# Patient Record
Sex: MALE | Race: WHITE | NOT HISPANIC OR LATINO | Employment: OTHER | URBAN - METROPOLITAN AREA
[De-identification: names, ages, dates, MRNs, and addresses within clinical notes are randomized per-mention and may not be internally consistent; named-entity substitution may affect disease eponyms.]

---

## 2017-06-17 DIAGNOSIS — I10 ESSENTIAL HYPERTENSION WITH GOAL BLOOD PRESSURE LESS THAN 140/90: ICD-10-CM

## 2017-06-19 RX ORDER — VALSARTAN AND HYDROCHLOROTHIAZIDE 320; 25 MG/1; MG/1
TABLET, FILM COATED ORAL
Start: 2017-06-19

## 2017-06-19 NOTE — TELEPHONE ENCOUNTER
Pending Prescriptions:                       Disp   Refills    valsartan-hydrochlorothiazide (DIOVAN-HCT*90 tab*2            Sig: TAKE 1 TABLET DAILY        Last Written Prescription Date: 7/8/2016  Last Fill Quantity: 90, # refills: 3  Last Office Visit with [facility], P or [facility] prescribing provider: 11/15/2016  Next 5 appointments (look out 90 days)     Aug 21, 2017 10:00 AM CDT   PHYSICAL with Amaury Lyon MD   [facility] ([facility])    [address] [place]  [facility] [id number] [phone]                   Potassium   Date Value Ref Range Status   11/15/2016 4.0 3.4 - 5.3 mmol/L Final     Creatinine   Date Value Ref Range Status   11/15/2016 1.15 0.66 - 1.25 mg/dL Final     BP Readings from Last 3 Encounters:   11/15/16 135/81   11/08/16 130/88   07/08/16 130/70

## 2017-08-21 ENCOUNTER — OFFICE VISIT (OUTPATIENT)
Dept: FAMILY MEDICINE | Facility: CLINIC | Age: 72
End: 2017-08-21
Payer: MEDICARE

## 2017-08-21 VITALS
DIASTOLIC BLOOD PRESSURE: 80 MMHG | SYSTOLIC BLOOD PRESSURE: 134 MMHG | OXYGEN SATURATION: 94 % | BODY MASS INDEX: 32.06 KG/M2 | TEMPERATURE: 96.9 F | RESPIRATION RATE: 14 BRPM | WEIGHT: 229 LBS | HEIGHT: 71 IN | HEART RATE: 74 BPM

## 2017-08-21 DIAGNOSIS — N40.1 BENIGN NON-NODULAR PROSTATIC HYPERPLASIA WITH LOWER URINARY TRACT SYMPTOMS: ICD-10-CM

## 2017-08-21 DIAGNOSIS — E05.90 HYPERTHYROIDISM: ICD-10-CM

## 2017-08-21 DIAGNOSIS — E78.5 HYPERLIPIDEMIA LDL GOAL <130: ICD-10-CM

## 2017-08-21 DIAGNOSIS — Z00.00 ROUTINE HISTORY AND PHYSICAL EXAMINATION OF ADULT: Primary | ICD-10-CM

## 2017-08-21 DIAGNOSIS — I10 ESSENTIAL HYPERTENSION WITH GOAL BLOOD PRESSURE LESS THAN 140/90: ICD-10-CM

## 2017-08-21 DIAGNOSIS — K21.9 GASTROESOPHAGEAL REFLUX DISEASE, ESOPHAGITIS PRESENCE NOT SPECIFIED: ICD-10-CM

## 2017-08-21 DIAGNOSIS — J30.1 CHRONIC SEASONAL ALLERGIC RHINITIS DUE TO POLLEN: ICD-10-CM

## 2017-08-21 LAB
ANION GAP SERPL CALCULATED.3IONS-SCNC: 8 MMOL/L (ref 3–14)
BUN SERPL-MCNC: 19 MG/DL (ref 7–30)
CALCIUM SERPL-MCNC: 9.1 MG/DL (ref 8.5–10.1)
CHLORIDE SERPL-SCNC: 103 MMOL/L (ref 94–109)
CHOLEST SERPL-MCNC: 183 MG/DL
CO2 SERPL-SCNC: 31 MMOL/L (ref 20–32)
CREAT SERPL-MCNC: 1.23 MG/DL (ref 0.66–1.25)
GFR SERPL CREATININE-BSD FRML MDRD: 58 ML/MIN/1.7M2
GLUCOSE SERPL-MCNC: 95 MG/DL (ref 70–99)
HDLC SERPL-MCNC: 53 MG/DL
LDLC SERPL CALC-MCNC: 103 MG/DL
NONHDLC SERPL-MCNC: 130 MG/DL
POTASSIUM SERPL-SCNC: 3.3 MMOL/L (ref 3.4–5.3)
SODIUM SERPL-SCNC: 142 MMOL/L (ref 133–144)
TRIGL SERPL-MCNC: 136 MG/DL
TSH SERPL DL<=0.005 MIU/L-ACNC: 1.17 MU/L (ref 0.4–4)

## 2017-08-21 PROCEDURE — 84443 ASSAY THYROID STIM HORMONE: CPT | Performed by: FAMILY MEDICINE

## 2017-08-21 PROCEDURE — G0439 PPPS, SUBSEQ VISIT: HCPCS | Performed by: FAMILY MEDICINE

## 2017-08-21 PROCEDURE — 80048 BASIC METABOLIC PNL TOTAL CA: CPT | Performed by: FAMILY MEDICINE

## 2017-08-21 PROCEDURE — 36415 COLL VENOUS BLD VENIPUNCTURE: CPT | Performed by: FAMILY MEDICINE

## 2017-08-21 PROCEDURE — 80061 LIPID PANEL: CPT | Performed by: FAMILY MEDICINE

## 2017-08-21 RX ORDER — VALSARTAN AND HYDROCHLOROTHIAZIDE 320; 25 MG/1; MG/1
1 TABLET, FILM COATED ORAL DAILY
Qty: 90 TABLET | Refills: 3 | Status: SHIPPED | OUTPATIENT
Start: 2017-08-21 | End: 2018-08-02

## 2017-08-21 RX ORDER — ROSUVASTATIN CALCIUM 10 MG/1
10 TABLET, COATED ORAL DAILY
Qty: 90 TABLET | Refills: 3 | Status: SHIPPED | OUTPATIENT
Start: 2017-08-21 | End: 2018-08-23

## 2017-08-21 RX ORDER — ESOMEPRAZOLE MAGNESIUM 40 MG/1
CAPSULE, DELAYED RELEASE ORAL
Qty: 90 CAPSULE | Refills: 3 | Status: SHIPPED | OUTPATIENT
Start: 2017-08-21 | End: 2018-09-18

## 2017-08-21 RX ORDER — FLUTICASONE PROPIONATE 50 MCG
2 SPRAY, SUSPENSION (ML) NASAL DAILY
Qty: 16 G | Refills: 3 | Status: SHIPPED | OUTPATIENT
Start: 2017-08-21 | End: 2018-01-30

## 2017-08-21 RX ORDER — FINASTERIDE 5 MG/1
1 TABLET, FILM COATED ORAL DAILY
Qty: 90 TABLET | Refills: 3 | Status: SHIPPED | OUTPATIENT
Start: 2017-08-21 | End: 2018-09-18

## 2017-08-21 NOTE — MR AVS SNAPSHOT
After Visit Summary   8/21/2017    Jerry Miguel    MRN: [id number]           Patient Information     Date Of Birth          1945        Visit Information        Provider Department      8/21/2017 10:00 AM Amaury Lyon MD [facility]        Today's Diagnoses     Routine history and physical examination of adult    -  1    Essential hypertension with goal blood pressure less than 140/90        Hyperlipidemia LDL goal <130        Gastroesophageal reflux disease, esophagitis presence not specified        Benign non-nodular prostatic hyperplasia with lower urinary tract symptoms        Chronic seasonal allergic rhinitis due to pollen        Hyperthyroidism          Care Instructions      Preventive Health Recommendations:       Male Ages 65 and over    Yearly exam:             See your health care provider every year in order to  o   Review health changes.   o   Discuss preventive care.    o   Review your medicines if your doctor has prescribed any.    Talk with your health care provider about whether you should have a test to screen for prostate cancer (PSA).    Every 3 years, have a diabetes test (fasting glucose). If you are at risk for diabetes, you should have this test more often.    Every 5 years, have a cholesterol test. Have this test more often if you are at risk for high cholesterol or heart disease.     Every 10 years, have a colonoscopy. Or, have a yearly FIT test (stool test). These exams will check for colon cancer.    Talk to with your health care provider about screening for Abdominal Aortic Aneurysm if you have a family history of AAA or have a history of smoking.  Shots:     Get a flu shot each year.     Get a tetanus shot every 10 years.     Talk to your doctor about your pneumonia vaccines. There are now two you should receive - Pneumovax (PPSV 23) and Prevnar (PCV 13).    Talk to your doctor about a shingles vaccine.     Talk to your doctor about the  hepatitis B vaccine.  Nutrition:     Eat at least 5 servings of fruits and vegetables each day.     Eat whole-grain bread, whole-wheat pasta and brown rice instead of white grains and rice.     Talk to your doctor about Calcium and Vitamin D.   Lifestyle    Exercise for at least 150 minutes a week (30 minutes a day, 5 days a week). This will help you control your weight and prevent disease.     Limit alcohol to one drink per day.     No smoking.     Wear sunscreen to prevent skin cancer.     See your dentist every six months for an exam and cleaning.     See your eye doctor every 1 to 2 years to screen for conditions such as glaucoma, macular degeneration and cataracts.          Follow-ups after your visit        Follow-up notes from your care team     Return in about 1 year (around 8/21/2018).      Who to contact     If you have questions or need follow up information about today's clinic visit or your schedule please contact [facility] directly at [phone].  Normal or non-critical lab and imaging results will be communicated to you by Skytaphart, letter or phone within 4 business days after the clinic has received the results. If you do not hear from us within 7 days, please contact the clinic through MeUndies or phone. If you have a critical or abnormal lab result, we will notify you by phone as soon as possible.  Submit refill requests through MeUndies or call your pharmacy and they will forward the refill request to us. Please allow 3 business days for your refill to be completed.          Additional Information About Your Visit        MeUndies Information     MeUndies gives you secure access to your electronic health record. If you see a primary care provider, you can also send messages to your care team and make appointments. If you have questions, please call your primary care clinic.  If you do not have a primary care provider, please call [phone] and they will assist you.        Care  "EveryWhere ID     This is your Care EveryWhere ID. This could be used by other organizations to access your [place] medical records  [id number]        Your Vitals Were     Pulse Temperature Respirations Height Pulse Oximetry BMI (Body Mass Index)    74 96.9  F (36.1  C) (Oral) 14 5' 11\" (1.803 m) 94% 31.94 kg/m2       Blood Pressure from Last 3 Encounters:   08/21/17 134/80   11/15/16 135/81   11/08/16 130/88    Weight from Last 3 Encounters:   08/21/17 229 lb (103.9 kg)   11/15/16 220 lb (99.8 kg)   11/08/16 217 lb (98.4 kg)              We Performed the Following     Basic metabolic panel  (Ca, Cl, CO2, Creat, Gluc, K, Na, BUN)     Lipid panel reflex to direct LDL     TSH with free T4 reflex          Where to get your medicines      These medications were sent to [facility] [address] [address]     Phone:  [phone]     esomeprazole 40 MG CR capsule    finasteride 5 MG tablet    fluticasone 50 MCG/ACT spray    rosuvastatin 10 MG tablet    valsartan-hydrochlorothiazide 320-25 MG per tablet          Primary Care Provider Office Phone # Fax #    Amaury Alejandro Lyon [phone] [address] [facility] [address]        Equal Access to Services     [facility]: Hadii juan david ku hadasho Soreiali, waaxda luqadaha, qaybta kaalmada adeegyada, nanci garay. So [facility] [phone].    ATENCIÓN: Si habla español, tiene a abel disposición servicios gratuitos de asistencia lingüística. Yocasta al [phone].    We comply with applicable federal civil rights laws and [place] laws. We do not discriminate on the basis of race, color, national origin, age, disability sex, sexual orientation or gender identity.            Thank you!     Thank you for choosing [facility]  for your care. Our goal is always to provide you with excellent care. Hearing back from our patients is one " way we can continue to improve our services. Please take a few minutes to complete the written survey that you may receive in the mail after your visit with us. Thank you!             Your Updated Medication List - Protect others around you: Learn how to safely use, store and throw away your medicines at www.disposemymeds.org.          This list is accurate as of: 8/21/17 11:15 AM.  Always use your most recent med list.                   Brand Name Dispense Instructions for use Diagnosis    acetaminophen 325 MG tablet    TYLENOL    100 tablet    Take 3 tablets (975 mg) by mouth every 8 hours    Primary osteoarthritis of right knee       esomeprazole 40 MG CR capsule    nexIUM    90 capsule    TAKE 1 CAPSULE DAILY 30 TO 60 MINUTES BEFORE EATING    Gastroesophageal reflux disease, esophagitis presence not specified       finasteride 5 MG tablet    PROSCAR    90 tablet    Take 1 tablet (5 mg) by mouth daily    Benign non-nodular prostatic hyperplasia with lower urinary tract symptoms       fluticasone 50 MCG/ACT spray    FLONASE    16 g    Spray 2 sprays into both nostrils daily    Chronic seasonal allergic rhinitis due to pollen       loratadine 10 MG tablet    CLARITIN     Take 10 mg by mouth daily.        order for DME     1 Device    [facility] [phone]   Directions: Advance as Tolerated and Instructed by MD    Primary osteoarthritis of right knee       rosuvastatin 10 MG tablet    CRESTOR    90 tablet    Take 1 tablet (10 mg) by mouth daily    Hyperlipidemia LDL goal <130       triamcinolone 0.1 % cream    KENALOG    15 g    Apply sparingly to affected area three times daily for 14 days.    Rash       valsartan-hydrochlorothiazide 320-25 MG per tablet    DIOVAN HCT    90 tablet    Take 1 tablet by mouth daily    Essential hypertension with goal blood pressure less than 140/90       VITAMIN D (CHOLECALCIFEROL) PO      Take 400 Units by mouth daily

## 2017-08-21 NOTE — NURSING NOTE
"Chief Complaint   Patient presents with     Wellness Visit       Initial /80  Pulse 74  Temp 96.9  F (36.1  C) (Oral)  Resp 14  Ht 5' 11\" (1.803 m)  Wt 229 lb (103.9 kg)  SpO2 94%  BMI 31.94 kg/m2 Estimated body mass index is 31.94 kg/(m^2) as calculated from the following:    Height as of this encounter: 5' 11\" (1.803 m).    Weight as of this encounter: 229 lb (103.9 kg).  BP completed using cuff size: large    Health Maintenance that is potentially due pending provider review:  Health Maintenance Due   Topic Date Due     BMP Q6 MOS  05/15/2017         lab order for screening pended  "

## 2017-08-21 NOTE — PROGRESS NOTES
SUBJECTIVE:   Jerry Miguel is a 72 year old male who presents for Preventive Visit.    Are you in the first 12 months of your Medicare Part B coverage?  No    Healthy Habits:    Do you get at least three servings of calcium containing foods daily (dairy, green leafy vegetables, etc.)? yes    Amount of exercise or daily activities, outside of work: 7 day(s) per week 1 1 1/2 miles    Problems taking medications regularly No    Medication side effects: No    Have you had an eye exam in the past two years? yes    Do you see a dentist twice per year? No-upcoming appt 9-2017    Do you have sleep apnea, excessive snoring or daytime drowsiness?yes-snoring    COGNITIVE SCREEN  1) Repeat 3 items (Banana, Sunrise, Chair)    2) Clock draw: NORMAL  3) 3 item recall: Recalls 3 objects  Results: 3 items recalled: COGNITIVE IMPAIRMENT LESS LIKELY    Mini-CogTM Copyright S Chidi. Licensed by the author for use in [facility]; reprinted with permission (ana@[facility]). All rights reserved.      Concerns:-fasting labs-lipids and TSH  Stable recheck on HTN, BPH, Hypothyroid      Reviewed and updated as needed this visit by clinical staff         Reviewed and updated as needed this visit by Provider      Social History   Substance Use Topics     Smoking status: Former Smoker     Types: Cigarettes     Smokeless tobacco: Never Used     Alcohol use 0.0 oz/week     0 Standard drinks or equivalent per week      Comment: 3x week       The patient does not drink >3 drinks per day nor >7 drinks per week.    Today's PHQ-2 Score:   PHQ-2 ( 1999 Pfizer) 7/8/2016 3/11/2016   Q1: Little interest or pleasure in doing things 0 0   Q2: Feeling down, depressed or hopeless 0 0   PHQ-2 Score 0 0       Do you feel safe in your environment - Yes    Do you have a Health Care Directive?: No: Advance care planning reviewed with patient; information given to patient to review.      Current providers sharing in care for this patient include:  Patient Care Team:  Amaury yLon MD as PCP - General (Family Practice)  Francoise Morales MD as MD (Ophthalmology)  Manuel Ponce MD as MD (Orthopaedic Surgery)      Hearing impairment: No    Ability to successfully perform activities of daily living: Yes, no assistance needed     Fall risk:  Fallen 2 or more times in the past year?: No  Any fall with injury in the past year?: No      Home safety:  none identified      The following health maintenance items are reviewed in Epic and correct as of today:Health Maintenance   Topic Date Due     BMP Q6 MOS  05/15/2017     INFLUENZA VACCINE (SYSTEM ASSIGNED)  09/01/2017     TSH Q1 YEAR  11/08/2017     CMP Q1 YR  11/08/2017     FALL RISK ASSESSMENT  11/08/2017     LIPID MONITORING Q1 YEAR  11/08/2017     ADVANCE DIRECTIVE PLANNING Q5 YRS  12/14/2017     COLON CANCER SCREEN (SYSTEM ASSIGNED)  11/01/2021     TETANUS IMMUNIZATION (SYSTEM ASSIGNED)  11/25/2024     PNEUMOCOCCAL  Completed     AORTIC ANEURYSM SCREENING (SYSTEM ASSIGNED)  Completed     HEPATITIS C SCREENING  Completed       STD screening:  History   Sexual Activity     Sexual activity: Yes     Partners: Female     no screening desired today        ROS: As per HPI.  Constitutional: no recent illness, no fevers/sweats/chills, sleep normal  Eyes: No vision changes or eye irritation  Ears/Nose/Throat: No runny nose, sore throat or ear pain  CV: no palpitations, no chest pain, no lower extremity swelling.  Resp: no shortness of breath, wheezing, or cough.  GI: no nausea/vomiting/diarrhea, normal stooling pattern, no reflux symptoms, no black or bloody stools  : no burning or urgency with urination, no blood in urine, no sores or discharge.  Skin: no changing moles or other lesions, no rash  Musculoskeletal: no joint pain, muscle pain, weakness, trauma or injury  Psych: no depression, no concerns about anxiety  Neuro: no new headaches, dizziness    I have reviewed and updated the patient's  past medical, social and family history in the EMR. Current problems are:  Patient Active Problem List    Diagnosis Date Noted     Right knee DJD 01/04/2016     Priority: Medium     Advanced care planning/counseling discussion 12/14/2012     Priority: Medium     Hypertension goal BP (blood pressure) < 140/90 07/06/2012     Priority: Medium     Osteoarthritis of knee 06/08/2012     Priority: Medium     CARDIOVASCULAR SCREENING; LDL GOAL LESS THAN 130 06/08/2012     Priority: Medium     Seasonal allergic rhinitis 06/08/2012     Priority: Medium     Hyperlipidemia LDL goal <130 06/08/2012     Priority: Medium     BPH (benign prostatic hypertrophy) 06/08/2012     Priority: Medium     GERD (gastroesophageal reflux disease) 06/08/2012     Priority: Medium     Hyperthyroidism 06/08/2012     Priority: Medium     Family Hx:  Family History   Problem Relation Age of Onset     CANCER Mother      ovarian     CANCER Father      lung     HEART DISEASE Father      possibly     CANCER Paternal Grandfather      esophageal cancer     CANCER Brother 68     prostate cancer     Breast Cancer No family hx of      Cancer - colorectal No family hx of      Coronary Artery Disease No family hx of      Hypertension No family hx of      Hyperlipidemia No family hx of      CEREBROVASCULAR DISEASE No family hx of      Colon Cancer No family hx of      Prostate Cancer No family hx of      Other Cancer No family hx of      Depression No family hx of      Anxiety Disorder No family hx of      MENTAL ILLNESS No family hx of      Substance Abuse No family hx of      Anesthesia Reaction No family hx of      Asthma No family hx of      OSTEOPOROSIS No family hx of      Genetic Disorder No family hx of      Thyroid Disease No family hx of      Obesity No family hx of      Unknown/Adopted No family hx of      DIABETES Maternal Grandmother      Social History:  Social History   Substance Use Topics     Smoking status: Former Smoker     Types: Cigarettes  "    Smokeless tobacco: Never Used     Alcohol use 0.0 oz/week     0 Standard drinks or equivalent per week      Comment: 3x week     Social History     Social History Narrative     Allergies:     Allergies   Allergen Reactions     Codeine Sulfate Nausea and Vomiting      Current Medications:  Current Outpatient Prescriptions   Medication Sig Dispense Refill     valsartan-hydrochlorothiazide (DIOVAN HCT) 320-25 MG per tablet Take 1 tablet by mouth daily 90 tablet 3     fluticasone (FLONASE) 50 MCG/ACT spray Spray 2 sprays into both nostrils daily 16 g 3     rosuvastatin (CRESTOR) 10 MG tablet Take 1 tablet (10 mg) by mouth daily 90 tablet 3     esomeprazole (NEXIUM) 40 MG CR capsule TAKE 1 CAPSULE DAILY 30 TO 60 MINUTES BEFORE EATING 90 capsule 3     finasteride (PROSCAR) 5 MG tablet Take 1 tablet (5 mg) by mouth daily 90 tablet 3     triamcinolone (KENALOG) 0.1 % cream Apply sparingly to affected area three times daily for 14 days. 15 g 0     acetaminophen (TYLENOL) 325 MG tablet Take 3 tablets (975 mg) by mouth every 8 hours 100 tablet 1     order for DME [facility]  [facility]  [phone]     Directions: Advance as Tolerated and Instructed by MD 1 Device 0     VITAMIN D, CHOLECALCIFEROL, PO Take 400 Units by mouth daily       loratadine (CLARITIN) 10 MG tablet Take 10 mg by mouth daily.         [DISCONTINUED] rosuvastatin (CRESTOR) 10 MG tablet Take 1 tablet (10 mg) by mouth daily 90 tablet 3     [DISCONTINUED] valsartan-hydrochlorothiazide (DIOVAN HCT) 320-25 MG per tablet Take 1 tablet by mouth daily 90 tablet 3        Objective:  /80  Pulse 74  Temp 96.9  F (36.1  C) (Oral)  Resp 14  Ht 5' 11\" (1.803 m)  Wt 229 lb (103.9 kg)  SpO2 94%  BMI 31.94 kg/m2  General Appearance: Pleasant, alert, WN/WD in no acute respiratory distress.  Head Exam: Normal. Normocephalic, atraumatic. No sinus tenderness.  Eye Exam: Normal external eye, conjunctiva, lids. ZHAO.  Ear Exam: Normal auditory canals and " external ears. Non-tender.  Left TM-normal. Right TM-normal.  OroPharynx Exam: Dental hygiene adequate. Normal buccal mucosa. Normal pharynx.  Neck Exam: Supple, no masses or enlarged, tender nodes.  Thyroid Exam: No nodules or enlargement or tenderness.  Chest/Respiratory Exam: Normal, comfortable, easy respirations. Chest wall normal. Lungs are clear to auscultation. No wheezing, crackles, or rhonchi.  Cardiovascular Exam: Regular rate and rhythm. No murmur, gallop, or rubs. No pedal edema.  Gastrointestinal Exam: Soft, non-tender, no masses or organomegaly.  Musculoskeletal Exam: Back is non-tender, full ROM of upper and lower extremities.  Skin: no rash, warm and dry.  Neurologic Exam: Nonfocal, no tremor. Normal gait.  Psychiatric Exam: Alert - appropriate, normal affect    ASSESSMENT/PLAN:    ICD-10-CM    1. Routine history and physical examination of adult Z00.00    2. Essential hypertension with goal blood pressure less than 140/90 I10 valsartan-hydrochlorothiazide (DIOVAN HCT) 320-25 MG per tablet     Basic metabolic panel  (Ca, Cl, CO2, Creat, Gluc, K, Na, BUN)     TSH with free T4 reflex     Lipid panel reflex to direct LDL   3. Hyperlipidemia LDL goal <130 E78.5 rosuvastatin (CRESTOR) 10 MG tablet   4. Gastroesophageal reflux disease, esophagitis presence not specified K21.9 esomeprazole (NEXIUM) 40 MG CR capsule   5. Benign non-nodular prostatic hyperplasia with lower urinary tract symptoms N40.1 finasteride (PROSCAR) 5 MG tablet   6. Chronic seasonal allergic rhinitis due to pollen J30.1 fluticasone (FLONASE) 50 MCG/ACT spray   7. Hyperthyroidism E05.90 TSH with free T4 reflex     Lipid panel reflex to direct LDL       End of Life Planning:  Patient currently has an advanced directive: Yes.  Practitioner is supportive of decision.    COUNSELING:  Reviewed preventive health counseling, as reflected in patient instructions       Regular exercise       Healthy diet/nutrition       Dental care       Colon  "cancer screening       Prostate cancer screening   Discussed the risks and benefits of prostate cancer screening via PSA, current recommendation from the USPSTF is again screening  But this could be modified by individual risk or family history  Patient declined screening today        Estimated body mass index is 30.68 kg/(m^2) as calculated from the following:    Height as of 11/15/16: 5' 11\" (1.803 m).    Weight as of 11/15/16: 220 lb (99.8 kg).  Weight management plan: Discussed healthy diet and exercise guidelines and patient will follow up in 12 months in clinic to re-evaluate.   reports that he has quit smoking. His smoking use included Cigarettes. He has never used smokeless tobacco.        Appropriate preventive services were discussed with this patient, including applicable screening as appropriate for cardiovascular disease, diabetes, osteopenia/osteoporosis, and glaucoma.  As appropriate for age/gender, discussed screening for colorectal cancer, prostate cancer, breast cancer, and cervical cancer. Checklist reviewing preventive services available has been given to the patient.    Reviewed patients plan of care and provided an AVS. The Intermediate Care Plan ( asthma action plan, low back pain action plan, and migraine action plan) for Jerry meets the Care Plan requirement. This Care Plan has been established and reviewed with the Patient.    Counseling Resources:  ATP IV Guidelines  Pooled Cohorts Equation Calculator  Breast Cancer Risk Calculator  FRAX Risk Assessment  ICSI Preventive Guidelines  Dietary Guidelines for Americans, 2010  USDA's MyPlate  ASA Prophylaxis  Lung CA Screening    Amaury Lyon MD  [facility]  "

## 2017-11-11 DIAGNOSIS — K21.9 GASTROESOPHAGEAL REFLUX DISEASE, ESOPHAGITIS PRESENCE NOT SPECIFIED: ICD-10-CM

## 2017-11-11 DIAGNOSIS — N40.1 BENIGN NON-NODULAR PROSTATIC HYPERPLASIA WITH LOWER URINARY TRACT SYMPTOMS: ICD-10-CM

## 2017-11-13 RX ORDER — ESOMEPRAZOLE MAGNESIUM 40 MG/1
CAPSULE, DELAYED RELEASE ORAL
Start: 2017-11-13

## 2017-11-13 RX ORDER — FINASTERIDE 5 MG/1
TABLET, FILM COATED ORAL
Start: 2017-11-13

## 2017-11-27 ENCOUNTER — MYC REFILL (OUTPATIENT)
Dept: FAMILY MEDICINE | Facility: CLINIC | Age: 72
End: 2017-11-27

## 2017-11-27 DIAGNOSIS — K21.9 GASTROESOPHAGEAL REFLUX DISEASE, ESOPHAGITIS PRESENCE NOT SPECIFIED: ICD-10-CM

## 2017-11-27 RX ORDER — ESOMEPRAZOLE MAGNESIUM 40 MG/1
CAPSULE, DELAYED RELEASE ORAL
Qty: 90 CAPSULE | Refills: 3 | Status: CANCELLED | OUTPATIENT
Start: 2017-11-27

## 2017-11-27 NOTE — TELEPHONE ENCOUNTER
Message from [employer]:  Original authorizing provider: Amaury Lyon MD    Jerry Miguel would like a refill of the following medications:  esomeprazole (NEXIUM) 40 MG CR capsule [Amaury Lyon MD]    Preferred pharmacy: [facility] - [address]    Comment:  [facility] was to contact to renew fthe above prescriptions. I had a physical with metabolic tests in August so there should be no difficulty in the renewal. I am out os Esomeprazole. Thanks

## 2018-01-30 ENCOUNTER — RADIANT APPOINTMENT (OUTPATIENT)
Dept: GENERAL RADIOLOGY | Facility: CLINIC | Age: 73
End: 2018-01-30
Attending: PHYSICIAN ASSISTANT
Payer: MEDICARE

## 2018-01-30 ENCOUNTER — OFFICE VISIT (OUTPATIENT)
Dept: FAMILY MEDICINE | Facility: CLINIC | Age: 73
End: 2018-01-30
Payer: MEDICARE

## 2018-01-30 VITALS
HEART RATE: 78 BPM | DIASTOLIC BLOOD PRESSURE: 80 MMHG | HEIGHT: 71 IN | OXYGEN SATURATION: 99 % | BODY MASS INDEX: 31.64 KG/M2 | TEMPERATURE: 96.8 F | SYSTOLIC BLOOD PRESSURE: 110 MMHG | WEIGHT: 226 LBS | RESPIRATION RATE: 14 BRPM

## 2018-01-30 DIAGNOSIS — K59.00 CONSTIPATION, UNSPECIFIED CONSTIPATION TYPE: Primary | ICD-10-CM

## 2018-01-30 DIAGNOSIS — R19.4 RECENT CHANGE IN FREQUENCY OF BOWEL MOVEMENTS: ICD-10-CM

## 2018-01-30 LAB
BASOPHILS # BLD AUTO: 0 10E9/L (ref 0–0.2)
BASOPHILS NFR BLD AUTO: 0.4 %
DIFFERENTIAL METHOD BLD: NORMAL
EOSINOPHIL # BLD AUTO: 0.2 10E9/L (ref 0–0.7)
EOSINOPHIL NFR BLD AUTO: 1.9 %
ERYTHROCYTE [DISTWIDTH] IN BLOOD BY AUTOMATED COUNT: 12.7 % (ref 10–15)
HCT VFR BLD AUTO: 46.8 % (ref 40–53)
HGB BLD-MCNC: 16.4 G/DL (ref 13.3–17.7)
LYMPHOCYTES # BLD AUTO: 2.9 10E9/L (ref 0.8–5.3)
LYMPHOCYTES NFR BLD AUTO: 35 %
MCH RBC QN AUTO: 29.3 PG (ref 26.5–33)
MCHC RBC AUTO-ENTMCNC: 35 G/DL (ref 31.5–36.5)
MCV RBC AUTO: 84 FL (ref 78–100)
MONOCYTES # BLD AUTO: 0.7 10E9/L (ref 0–1.3)
MONOCYTES NFR BLD AUTO: 8.6 %
NEUTROPHILS # BLD AUTO: 4.4 10E9/L (ref 1.6–8.3)
NEUTROPHILS NFR BLD AUTO: 54.1 %
PLATELET # BLD AUTO: 224 10E9/L (ref 150–450)
RBC # BLD AUTO: 5.59 10E12/L (ref 4.4–5.9)
WBC # BLD AUTO: 8.2 10E9/L (ref 4–11)

## 2018-01-30 PROCEDURE — 36415 COLL VENOUS BLD VENIPUNCTURE: CPT | Performed by: PHYSICIAN ASSISTANT

## 2018-01-30 PROCEDURE — 99213 OFFICE O/P EST LOW 20 MIN: CPT | Performed by: PHYSICIAN ASSISTANT

## 2018-01-30 PROCEDURE — 74019 RADEX ABDOMEN 2 VIEWS: CPT | Mod: FY

## 2018-01-30 PROCEDURE — 80050 GENERAL HEALTH PANEL: CPT | Performed by: PHYSICIAN ASSISTANT

## 2018-01-30 NOTE — MR AVS SNAPSHOT
After Visit Summary   1/30/2018    Jerry Miguel    MRN: [id number]           Patient Information     Date Of Birth          1945        Visit Information        Provider Department      1/30/2018 1:00 PM Eliot Sabillon PA-C [facility]        Today's Diagnoses     Constipation, unspecified constipation type    -  1    Recent change in frequency of bowel movements           Follow-ups after your visit        Additional Services     GASTROENTEROLOGY ADULT REF PROCEDURE ONLY       Last Lab Result: Creatinine (mg/dL)       Date                     Value                 08/21/2017               1.23             ----------  Body mass index is 31.52 kg/(m^2).     Needed:  No  Language:  English    Patient will be contacted to schedule procedure.     Please be aware that coverage of these services is subject to the terms and limitations of your health insurance plan.  Call member services at your health plan with any benefit or coverage questions.  Any procedures must be performed at a [facility] facility OR coordinated by your clinic's referral office.    Please bring the following with you to your appointment:    (1) Any X-Rays, CTs or MRIs which have been performed.  Contact the facility where they were done to arrange for  prior to your scheduled appointment.    (2) List of current medications   (3) This referral request   (4) Any documents/labs given to you for this referral                  Who to contact     If you have questions or need follow up information about today's clinic visit or your schedule please contact [facility] directly at [phone].  Normal or non-critical lab and imaging results will be communicated to you by MyChart, letter or phone within 4 business days after the clinic has received the results. If you do not hear from us within 7 days, please contact the clinic through MyChart or phone. If you have a critical or abnormal lab  "result, we will notify you by phone as soon as possible.  Submit refill requests through [employer] or call your pharmacy and they will forward the refill request to us. Please allow 3 business days for your refill to be completed.          Additional Information About Your Visit        [employer] Information     [employer] gives you secure access to your electronic health record. If you see a primary care provider, you can also send messages to your care team and make appointments. If you have questions, please call your primary care clinic.  If you do not have a primary care provider, please call [phone] and they will assist you.        Care EveryWhere ID     This is your Care EveryWhere ID. This could be used by other organizations to access your [place] medical records  [id number]        Your Vitals Were     Pulse Temperature Respirations Height Pulse Oximetry BMI (Body Mass Index)    78 96.8  F (36  C) (Oral) 14 5' 11\" (1.803 m) 99% 31.52 kg/m2       Blood Pressure from Last 3 Encounters:   01/30/18 110/80   08/21/17 134/80   11/15/16 135/81    Weight from Last 3 Encounters:   01/30/18 226 lb (102.5 kg)   08/21/17 229 lb (103.9 kg)   11/15/16 220 lb (99.8 kg)              We Performed the Following     CBC with platelets differential     Comprehensive metabolic panel     GASTROENTEROLOGY ADULT REF PROCEDURE ONLY     TSH with free T4 reflex     XR Abdomen 2 Views        Primary Care Provider Office Phone # Fax #    Amaury Alejandro Lyon [phone] [address] [facility] [address]        Equal Access to Services     GRACIA GUILLEN : Hadii aad miriam tayloro Sojaime, waaxda luqadaha, qaybta kaalmada tom, nanci riley . So [facility] [phone].    ATENCIÓN: Si habla español, tiene a abel disposición servicios gratuitos de asistencia lingüística. Llame al [phone].    We comply with applicable federal civil rights laws and [place] laws. We do not " discriminate on the basis of race, color, national origin, age, disability, sex, sexual orientation, or gender identity.            Thank you!     Thank you for choosing [facility]  for your care. Our goal is always to provide you with excellent care. Hearing back from our patients is one way we can continue to improve our services. Please take a few minutes to complete the written survey that you may receive in the mail after your visit with us. Thank you!             Your Updated Medication List - Protect others around you: Learn how to safely use, store and throw away your medicines at www.disposemymeds.org.          This list is accurate as of 1/30/18  1:30 PM.  Always use your most recent med list.                   Brand Name Dispense Instructions for use Diagnosis    esomeprazole 40 MG CR capsule    nexIUM    90 capsule    TAKE 1 CAPSULE DAILY 30 TO 60 MINUTES BEFORE EATING    Gastroesophageal reflux disease, esophagitis presence not specified       finasteride 5 MG tablet    PROSCAR    90 tablet    Take 1 tablet (5 mg) by mouth daily    Benign non-nodular prostatic hyperplasia with lower urinary tract symptoms       rosuvastatin 10 MG tablet    CRESTOR    90 tablet    Take 1 tablet (10 mg) by mouth daily    Hyperlipidemia LDL goal <130       valsartan-hydrochlorothiazide 320-25 MG per tablet    DIOVAN HCT    90 tablet    Take 1 tablet by mouth daily    Essential hypertension with goal blood pressure less than 140/90       VITAMIN D (CHOLECALCIFEROL) PO      Take 400 Units by mouth daily

## 2018-01-30 NOTE — PROGRESS NOTES
"  SUBJECTIVE:   Jerry Miguel is a 72 year old male who presents to clinic today for the following health issues:      Blood in stool.     Duration: 5 days    Description (location/character/radiation): feels constipated     Intensity:  mild    Accompanying signs and symptoms: none    History (similar episodes/previous evaluation): None    Precipitating or alleviating factors: None    Therapies tried and outcome: None     5-6 days ago patient started noticing change in stool habits. Felt he had increased straining and as though he had some impacted stool that was preventing him from having a full bowel movement. Patient continued having regular bowel movements once per day. 2 days ago, after significant straining patient noted a small amount of bright red blood in toilet bowl and on toilet paper. Patient has seen blood a few other times in the past, but has never had a significant amount of bleeding or black tarry stools. Yesterday, patient did use an OTC suppository to try and help with straining. Had 2 bowel movements but still felt straining and sensation of impacted stool.  Patient has tried to drink more water, exercise and swim to help with this feeling, but this has not helped significantly. Denies significant abdominal pain, distention or discomfort. No weight loss, early satiety or dizziness noted. Patient has noticed a small change in the caliber of stool, which he describes as more ribbon like, \"as though its going around something.\"     Prior to this change, patient did have regular bowel movements. Has had constipation in the past, but this feels different. Diet has changed in past few months to include more meat and less fruits and vegetables. Has had 2 screening colonoscopies in the past. First one was in [place], and showed some polyps, but patient was advised to keep screening every 10 yrs. Patient reports last colonoscopy was clear, records are unavailable.         Problem list and histories " reviewed & adjusted, as indicated.  Additional history: 71 y/o new to me male here for evaluation of some recent blood in his stool.      Patient Active Problem List   Diagnosis     Osteoarthritis of knee     CARDIOVASCULAR SCREENING; LDL GOAL LESS THAN 130     Seasonal allergic rhinitis     Hyperlipidemia LDL goal <130     BPH (benign prostatic hypertrophy)     GERD (gastroesophageal reflux disease)     Hyperthyroidism     Hypertension goal BP (blood pressure) < 140/90     Advanced care planning/counseling discussion     Right knee DJD     Past Surgical History:   Procedure Laterality Date     APPENDECTOMY       ARTHROPLASTY KNEE Right 1/4/2016    Procedure: ARTHROPLASTY KNEE;  Surgeon: Manuel Ponce MD;  Location: [facility] OR     COLONOSCOPY       ENT SURGERY       LASER HOLMIUM LITHOTRIPSY URETER(S), INSERT STENT, COMBINED  10/11/2012    Procedure: COMBINED CYSTOSCOPY, URETEROSCOPY, LASER HOLMIUM LITHOTRIPSY URETER(S), INSERT STENT;  CYSTOSCOPY, ATTEMPTED RIGHT URETEROSCOPY, INSERT RIGHT URETERAL STENT, Laser Standby;  Surgeon: Petar Ulrich MD;  Location: [facility] OR       Social History   Substance Use Topics     Smoking status: Former Smoker     Types: Cigarettes     Smokeless tobacco: Never Used     Alcohol use 0.0 oz/week     0 Standard drinks or equivalent per week      Comment: 3x week     Family History   Problem Relation Age of Onset     CANCER Mother      ovarian     CANCER Father      lung     HEART DISEASE Father      possibly     CANCER Paternal Grandfather      esophageal cancer     CANCER Brother 68     prostate cancer     Breast Cancer No family hx of      Cancer - colorectal No family hx of      Coronary Artery Disease No family hx of      Hypertension No family hx of      Hyperlipidemia No family hx of      CEREBROVASCULAR DISEASE No family hx of      Colon Cancer No family hx of      Prostate Cancer No family hx of      Other Cancer No family hx of      Depression No family hx of       "Anxiety Disorder No family hx of      MENTAL ILLNESS No family hx of      Substance Abuse No family hx of      Anesthesia Reaction No family hx of      Asthma No family hx of      OSTEOPOROSIS No family hx of      Genetic Disorder No family hx of      Thyroid Disease No family hx of      Obesity No family hx of      Unknown/Adopted No family hx of      DIABETES Maternal Grandmother          Current Outpatient Prescriptions   Medication Sig Dispense Refill     valsartan-hydrochlorothiazide (DIOVAN HCT) 320-25 MG per tablet Take 1 tablet by mouth daily 90 tablet 3     rosuvastatin (CRESTOR) 10 MG tablet Take 1 tablet (10 mg) by mouth daily 90 tablet 3     esomeprazole (NEXIUM) 40 MG CR capsule TAKE 1 CAPSULE DAILY 30 TO 60 MINUTES BEFORE EATING 90 capsule 3     finasteride (PROSCAR) 5 MG tablet Take 1 tablet (5 mg) by mouth daily 90 tablet 3     VITAMIN D, CHOLECALCIFEROL, PO Take 400 Units by mouth daily         Reviewed and updated as needed this visit by clinical staff       Reviewed and updated as needed this visit by Provider         ROS:  CONSTITUTIONAL:NEGATIVE for fever, chills, change in weight  GI: POSITIVE for constipation with straining, gas and bloating,  hematochezia, internal hemorrhoids  NEGATIVE for abdominal pain generalized, heartburn or reflux, melena, poor appetite and vomiting  : negative for dysuria, decreased urinary stream,     OBJECTIVE:                                                    /80  Pulse 78  Temp 96.8  F (36  C) (Oral)  Resp 14  Ht 5' 11\" (1.803 m)  Wt 226 lb (102.5 kg)  SpO2 99%  BMI 31.52 kg/m2  Body mass index is 31.52 kg/(m^2).  GENERAL APPEARANCE: healthy, alert and no distress  RESP: breathing comfortably, no wheezing or shortness of breath  ABDOMEN: bowel sounds normal, obese and soft, non-tender    Diagnostic test results:  Diagnostic Test Results:  No results found for this or any previous visit (from the past 24 hour(s)).     ASSESSMENT/PLAN:                "                                     1. Constipation, unspecified constipation type  Patient with 5-6 days of change in bowel habits including straining and sensation of fecal impaction, with a small amount of bright red blood. Feel this may be related to constipation, but would recommend colonoscopy to rule out other causes of change to bowel habits. Will check Hgb to evaluate for unknown bleed, as well as TSH to rule out thyroid dysfunction as a cause for his constipation.  - CBC with platelets differential  - TSH with free T4 reflex  - XR Abdomen 2 Views  - GASTROENTEROLOGY ADULT REF PROCEDURE ONLY  - Comprehensive metabolic panel    2. Recent change in frequency of bowel movements  Patient with difficulty having bowel movement as well as some concern for change in caliber of stool.   - GASTROENTEROLOGY ADULT REF PROCEDURE ONLY      Follow up with Provider - if symptoms fail to improve or worsen.     Pamela Degroot, MS4  The medical student has acted as my scribe.  I have completed all components of the history, physical exam and assessment and plan and agree with the note as documented.       Eliot Sabillon PA-C  [facility]

## 2018-01-30 NOTE — PROGRESS NOTES
Dear Jerry    Your test results are attached, feel free to contact me via [employer]     I am releasing your xray and blood work.  Overall, things looks good.  We do not see a larger amount of stool left in your colon on xray, and your blood count is very normal.  I think the next step will be to get the colonoscopy, so you should be getting a call soon to get that scheduled.  Let me know if you have any questions prior to that.    Leo Sabillon PA-C

## 2018-01-31 LAB
ALBUMIN SERPL-MCNC: 4.5 G/DL (ref 3.4–5)
ALP SERPL-CCNC: 63 U/L (ref 40–150)
ALT SERPL W P-5'-P-CCNC: 36 U/L (ref 0–70)
ANION GAP SERPL CALCULATED.3IONS-SCNC: 8 MMOL/L (ref 3–14)
AST SERPL W P-5'-P-CCNC: 29 U/L (ref 0–45)
BILIRUB SERPL-MCNC: 0.9 MG/DL (ref 0.2–1.3)
BUN SERPL-MCNC: 15 MG/DL (ref 7–30)
CALCIUM SERPL-MCNC: 9.4 MG/DL (ref 8.5–10.1)
CHLORIDE SERPL-SCNC: 97 MMOL/L (ref 94–109)
CO2 SERPL-SCNC: 32 MMOL/L (ref 20–32)
CREAT SERPL-MCNC: 1.32 MG/DL (ref 0.66–1.25)
GFR SERPL CREATININE-BSD FRML MDRD: 54 ML/MIN/1.7M2
GLUCOSE SERPL-MCNC: 101 MG/DL (ref 70–99)
POTASSIUM SERPL-SCNC: 3.3 MMOL/L (ref 3.4–5.3)
PROT SERPL-MCNC: 7.7 G/DL (ref 6.8–8.8)
SODIUM SERPL-SCNC: 137 MMOL/L (ref 133–144)
TSH SERPL DL<=0.005 MIU/L-ACNC: 2.1 MU/L (ref 0.4–4)

## 2018-02-09 ENCOUNTER — MYC MEDICAL ADVICE (OUTPATIENT)
Dept: FAMILY MEDICINE | Facility: CLINIC | Age: 73
End: 2018-02-09

## 2018-03-02 ENCOUNTER — SURGERY (OUTPATIENT)
Age: 73
End: 2018-03-02

## 2018-03-02 ENCOUNTER — HOSPITAL ENCOUNTER (OUTPATIENT)
Facility: AMBULATORY SURGERY CENTER | Age: 73
Discharge: HOME | End: 2018-03-02
Attending: FAMILY MEDICINE | Admitting: FAMILY MEDICINE
Payer: MEDICARE

## 2018-03-02 VITALS
HEIGHT: 71 IN | DIASTOLIC BLOOD PRESSURE: 97 MMHG | BODY MASS INDEX: 31.5 KG/M2 | SYSTOLIC BLOOD PRESSURE: 120 MMHG | RESPIRATION RATE: 16 BRPM | WEIGHT: 225 LBS | OXYGEN SATURATION: 94 % | TEMPERATURE: 96.9 F

## 2018-03-02 LAB — COLONOSCOPY: NORMAL

## 2018-03-02 PROCEDURE — G8918 PT W/O PREOP ORDER IV AB PRO: HCPCS

## 2018-03-02 PROCEDURE — 45385 COLONOSCOPY W/LESION REMOVAL: CPT | Mod: PT

## 2018-03-02 PROCEDURE — 45385 COLONOSCOPY W/LESION REMOVAL: CPT | Mod: PT | Performed by: FAMILY MEDICINE

## 2018-03-02 PROCEDURE — 99152 MOD SED SAME PHYS/QHP 5/>YRS: CPT | Mod: 59 | Performed by: FAMILY MEDICINE

## 2018-03-02 PROCEDURE — G8907 PT DOC NO EVENTS ON DISCHARG: HCPCS

## 2018-03-02 RX ORDER — ONDANSETRON 2 MG/ML
4 INJECTION INTRAMUSCULAR; INTRAVENOUS
Status: DISCONTINUED | OUTPATIENT
Start: 2018-03-02 | End: 2018-03-03 | Stop reason: HOSPADM

## 2018-03-02 RX ORDER — FENTANYL CITRATE 50 UG/ML
INJECTION, SOLUTION INTRAMUSCULAR; INTRAVENOUS PRN
Status: DISCONTINUED | OUTPATIENT
Start: 2018-03-02 | End: 2018-03-02 | Stop reason: HOSPADM

## 2018-03-02 RX ORDER — LIDOCAINE 40 MG/G
CREAM TOPICAL
Status: DISCONTINUED | OUTPATIENT
Start: 2018-03-02 | End: 2018-03-03 | Stop reason: HOSPADM

## 2018-03-02 RX ADMIN — FENTANYL CITRATE 100 MCG: 50 INJECTION, SOLUTION INTRAMUSCULAR; INTRAVENOUS at 09:46

## 2018-03-02 RX ADMIN — FENTANYL CITRATE 25 MCG: 50 INJECTION, SOLUTION INTRAMUSCULAR; INTRAVENOUS at 10:11

## 2018-03-06 ENCOUNTER — TELEPHONE (OUTPATIENT)
Dept: FAMILY MEDICINE | Facility: CLINIC | Age: 73
End: 2018-03-06

## 2018-03-06 LAB — COPATH REPORT: NORMAL

## 2018-03-06 NOTE — TELEPHONE ENCOUNTER
Please notify Jerry of his results.     The type of polyps that were removed during your colonoscopy are tubular adenomas. These are not cancer.  These are the type of polyps that sometimes will turn into one.  These polyps are out and will not cause you any further problems.  However, you should have another colonoscopy in 3 years to evaluate for other polyps due to the number and types of polyps removed.    Please let me know if you have any questions.     Edith Craig MD

## 2018-07-17 ENCOUNTER — TELEPHONE (OUTPATIENT)
Dept: DERMATOLOGY | Facility: CLINIC | Age: 73
End: 2018-07-17

## 2018-07-17 NOTE — TELEPHONE ENCOUNTER
Dermatology Pre-visit Call:    Reason for visit : growth on face     Any personal history of skin cancers: No    Was the patient referred: No    Has the patient seen a dermatologist in the past: Yes.  [facility] (If yes, obtain records)    Patient Reminders Given:  --Please, make sure you bring an updated list of your medications.   --Plan on being in our facility for approximately one hour, this includes the registration process, office visit, education and check-out process.  If you are having a procedure, more time may be required.     --If you are having a procedure, please, present 15 minutes early.  --Location reviewed.   --If you need to cancel or reschedule, call XXXX  --We look forward to seeing you in Dermatology Clinic.       regarding appointment on 7/24/18 at 1530. Call back number provided for questions or rescheduling.

## 2018-07-24 ENCOUNTER — OFFICE VISIT (OUTPATIENT)
Dept: DERMATOLOGY | Facility: CLINIC | Age: 73
End: 2018-07-24
Payer: MEDICARE

## 2018-07-24 DIAGNOSIS — L57.0 AK (ACTINIC KERATOSIS): Primary | ICD-10-CM

## 2018-07-24 DIAGNOSIS — B36.0 PV (PITYRIASIS VERSICOLOR): ICD-10-CM

## 2018-07-24 PROCEDURE — 88305 TISSUE EXAM BY PATHOLOGIST: CPT | Performed by: DERMATOLOGY

## 2018-07-24 RX ORDER — KETOCONAZOLE 20 MG/G
CREAM TOPICAL DAILY
Qty: 60 G | Refills: 3 | Status: SHIPPED | OUTPATIENT
Start: 2018-07-24 | End: 2020-09-14

## 2018-07-24 ASSESSMENT — PAIN SCALES - GENERAL
PAINLEVEL: NO PAIN (0)
PAINLEVEL: NO PAIN (0)

## 2018-07-24 NOTE — PROGRESS NOTES
"[facility] Dermatology Note      Dermatology Problem List:  1.Benign nevi removed    Encounter Date: Jul 24, 2018    CC:  Chief Complaint   Patient presents with     Derm Problem     Jerry is here to have a spot on his chin looked at. He states that there are no other areas of concern at this time.          History of Present Illness:  Mr. Jerry Miguel is a 73 year old male who with no history of skin cancer presents with a concern about an area in his beard that will grow and regress spontaneously. This spot grows out \"like a cone\" and he notes it has broken off previously as well. It is not painful or pruritic. He notes he also has a spot on the back of his left leg that he was told was benign previously but he cannot recall what the dermatologist called this spot.  States he had a lot of sun exposure in his youth and did not wear sunscreen. He does not wear sunscreen but typically wears a hat. Does not wear long sleeves or pants when it is warm out.    Past Medical History:   Patient Active Problem List   Diagnosis     Osteoarthritis of knee     CARDIOVASCULAR SCREENING; LDL GOAL LESS THAN 130     Seasonal allergic rhinitis     Hyperlipidemia LDL goal <130     BPH (benign prostatic hypertrophy)     GERD (gastroesophageal reflux disease)     Hyperthyroidism     Hypertension goal BP (blood pressure) < 140/90     Advanced care planning/counseling discussion     Right knee DJD     Past Medical History:   Diagnosis Date     Arthritis     knee     BPH (benign prostatic hyperplasia)      Chronic prostatitis      Gastro-oesophageal reflux disease      Hypertension      Kidney stone      Shingles      Thyroid disease 2001-about    h/o hyperthyroid,treated, now normal     Past Surgical History:   Procedure Laterality Date     APPENDECTOMY       ARTHROPLASTY KNEE Right 1/4/2016    Procedure: ARTHROPLASTY KNEE;  Surgeon: Manuel Ponce MD;  Location: [facility] OR     COLONOSCOPY       COLONOSCOPY " WITH CO2 INSUFFLATION N/A 3/2/2018    Procedure: COLONOSCOPY WITH CO2 INSUFFLATION;  Colonoscopy, Constipation, unspecified constipation type Recent change in frequency of bowel movements, Sabillon, BMI 31.52, [facility];  Surgeon: Edith Craig MD;  Location: [facility] OR     ENT SURGERY       LASER HOLMIUM LITHOTRIPSY URETER(S), INSERT STENT, COMBINED  10/11/2012    Procedure: COMBINED CYSTOSCOPY, URETEROSCOPY, LASER HOLMIUM LITHOTRIPSY URETER(S), INSERT STENT;  CYSTOSCOPY, ATTEMPTED RIGHT URETEROSCOPY, INSERT RIGHT URETERAL STENT, Laser Standby;  Surgeon: Petar Ulrich MD;  Location: [facility] OR       Social History:  The patient is retired.     Family History:  Not assessed today    Medications:  Current Outpatient Prescriptions   Medication Sig Dispense Refill     esomeprazole (NEXIUM) 40 MG CR capsule TAKE 1 CAPSULE DAILY 30 TO 60 MINUTES BEFORE EATING 90 capsule 3     finasteride (PROSCAR) 5 MG tablet Take 1 tablet (5 mg) by mouth daily 90 tablet 3     fluticasone (VERAMYST) 27.5 MCG/SPRAY spray Spray 1 spray into both nostrils daily       rosuvastatin (CRESTOR) 10 MG tablet Take 1 tablet (10 mg) by mouth daily 90 tablet 3     valsartan-hydrochlorothiazide (DIOVAN HCT) 320-25 MG per tablet Take 1 tablet by mouth daily 90 tablet 3     VITAMIN D, CHOLECALCIFEROL, PO Take 400 Units by mouth daily       Allergies   Allergen Reactions     Codeine Sulfate Nausea and Vomiting         Review of Systems:  -Constitutional: The patient denies fatigue, fevers, chills, unintended weight loss, and night sweats.  -Skin: As above in HPI. No additional skin concerns.    Physical exam:  Vitals: There were no vitals taken for this visit.  GEN: This is a well developed, well-nourished male in no acute distress, in a pleasant mood.    SKIN: Waist-up skin, which includes the head/face, neck, both arms, chest, back, abdomen, digits and/or nails was examined.  -Several scattered cherry angiomas to chest  -9mm waxy light brown  papule to left posterior leg   -Cornu cutaneum to left chin within beard  -No other lesions of concern on areas examined.     Impression/Plan:  1. Cornu cutaneum- Etiology unclear so biopsy performed today to r/o verruca vulgaris vs SCC vs verruca vulgaris    Shave biopsy(performed by faculty): After discussion of benefits and risks including but not limited to bleeding, infection, scar, incomplete removal, recurrence, and non-diagnostic biopsy, written consent and photographs were obtained. Time-out was performed. The area was cleaned with isopropyl alcohol. 1.5 mL of 1% lidocaine was injected to obtain adequate anesthesia of the lesion on the left chin. A shave biopsy was performed. Hemostasis was achieved with electrocautery. The patient tolerated the procedure and no complications were noted. The patient was provided with verbal and written post care instructions.     2. Cherry angioma(s)    Benign nature was discussed.No further intervention required at this time.     3. Seborrheic keratosis, non irritated    Benign nature was discussed.No further intervention required at this time.     Follow-up in 1 year, earlier for new or changing lesions.     Staff Involved:  Scribed by Nicole Erickson MS4 for Dr. Shemar Segal.    Staff Physician Comments:  I was present with the medical student who participated in the service and in the documentation of the note. I have verified the history and personally performed the physical exam and medical decision making. I agree with the assessment and plan as documented in the note. I have reviewed and if necessary amended the note.  I personally performed the procedure.      Shemar Segal MD  Professor  Head of Dermato-Allergy Division  Department of Dermatology  [facility]

## 2018-07-24 NOTE — MR AVS SNAPSHOT
After Visit Summary   7/24/2018    Jerry Miguel    MRN: [id number]           Patient Information     Date Of Birth          1945        Visit Information        Provider Department      7/24/2018 3:30 PM Shemar Segal MD [facility] Dermat[facility]        Today's Diagnoses     AK (actinic keratosis)    -  1    PV (pityriasis versicolor)          Care Instructions    Wound Care After a Biopsy    What is a skin biopsy?  A skin biopsy allows the doctor to examine a very small piece of tissue under the microscope to determine the diagnosis and the best treatment for the skin condition. A local anesthetic (numbing medicine)  is injected with a very small needle into the skin area to be tested. A small piece of skin is taken from the area. Sometimes a suture (stitch) is used.     What are the risks of a skin biopsy?  I will experience scar, bleeding, swelling, pain, crusting and redness. I may experience incomplete removal or recurrence. Risks of this procedure are excessive bleeding, bruising, infection, nerve damage, numbness, thick (hypertrophic or keloidal) scar and non-diagnostic biopsy.    How should I care for my wound for the first 24 hours?    Keep the wound dry and covered for 24 hours    If it bleeds, hold direct pressure on the area for 15 minutes. If bleeding does not stop then go to the emergency room    Avoid strenuous exercise the first 1-2 days or as your doctor instructs you    How should I care for the wound after 24 hours?    After 24 hours, remove the bandage    You may bathe or shower as normal    If you had a scalp biopsy, you can shampoo as usual and can use shower water to clean the biopsy site daily    Clean the wound twice a day with gentle soap and water    Do not scrub, be gentle    Apply white petroleum/Vaseline after cleaning the wound with a cotton swab or a clean finger, and keep the site covered with a Bandaid /bandage. Bandages are not necessary with a scalp  biopsy    If you are unable to cover the site with a Bandaid /bandage, re-apply ointment 2-3 times a day to keep the site moist. Moisture will help with healing    Avoid strenuous activity for first 1-2 days    Avoid lakes, rivers, pools, and oceans until the stitches are removed or the site is healed    How do I clean my wound?    Wash hands thoroughly with soap or use hand  before all wound care    Clean the wound with gentle soap and water    Apply white petroleum/Vaseline  to wound after it is clean    Replace the Bandaid /bandage to keep the wound covered for the first few days or as instructed by your doctor    If you had a scalp biopsy, warm shower water to the area on a daily basis should suffice    What should I use to clean my wound?     Cotton-tipped applicators (Qtips )    White petroleum jelly (Vaseline ). Use a clean new container and use Q-tips to apply.    Bandaids   as needed    Gentle soap     How should I care for my wound long term?    Do not get your wound dirty    Keep up with wound care for one week or until the area is healed.    A small scab will form and fall off by itself when the area is completely healed. The area will be red and will become pink in color as it heals. Sun protection is very important for how your scar will turn out. Sunscreen with an SPF 30 or greater is recommended once the area is healed.    You may return to our clinic for this or you may have it done locally at your doctor s office.    You should have some soreness but it should be mild and slowly go away over several days. Talk to your doctor about using tylenol for pain,    When should I call my doctor?  If you have increased:     Pain or swelling    Pus or drainage (clear or slightly yellow drainage is ok)    Temperature over 100F    Spreading redness or warmth around wound    When will I hear about my results?  The biopsy results can take 2-3 weeks to come back. The clinic will call you with the results,  send you a [employer]) message, or have you schedule a follow-up clinic or phone time to discuss the results. Contact our clinics if you do not hear from us in 3 weeks.     Who should I call with questions?    [facility]: [phone]     [facility]: [phone]    For urgent needs outside of business hours call the [facility] at [phone] and ask for the dermatology resident on call                            Follow-ups after your visit        Who to contact     Please call your clinic at [phone] to:    Ask questions about your health    Make or cancel appointments    Discuss your medicines    Learn about your test results    Speak to your doctor            Additional Information About Your Visit        [employer] Information     [employer] gives you secure access to your electronic health record. If you see a primary care provider, you can also send messages to your care team and make appointments. If you have questions, please call your primary care clinic.  If you do not have a primary care provider, please call [phone] and they will assist you.      [employer] is an electronic gateway that provides easy, online access to your medical records. With [employer], you can request a clinic appointment, read your test results, renew a prescription or communicate with your care team.     To access your existing account, please contact your [facility] or call [phone] for assistance.        Care EveryWhere ID     This is your Care EveryWhere ID. This could be used by other organizations to access your [facility] medical records  [id number]         Blood Pressure from Last 3 Encounters:   03/02/18 (!) 120/97   01/30/18 110/80   08/21/17 134/80    Weight from Last 3 Encounters:   02/26/18 102.1 kg (225 lb)   01/30/18 102.5 kg (226 lb)   08/21/17 103.9 kg (229 lb)              We Performed the Following      Dermatological path order and indications          Today's Medication Changes          These changes are accurate as of 7/24/18  3:33 PM.  If you have any questions, ask your nurse or doctor.               Start taking these medicines.        Dose/Directions    ketoconazole 2 % cream   Commonly known as:  NIZORAL   Used for:  AK (actinic keratosis)   Started by:  Shemar Segal MD        Apply topically daily Apply topically daily for 1 month   Quantity:  60 g   Refills:  3            Where to get your medicines      These medications were sent to [facility]/pharmacy #[id number] - [place], [place] - [address] [address]     Phone:  [phone]     ketoconazole 2 % cream                Primary Care Provider Office Phone # Fax #    Amaury Alejandro Lyon [phone] [address] [facility] [address]        Equal Access to Services     [facility]: Hadii aad ku hadasho Soomaali, waaxda luqadaha, qaybta kaalmada adeegyada, waxay carmenin hayaan mitzy riley . So [facility] [phone].    ATENCIÓN: Si habla español, tiene a abel disposición servicios gratuitos de asistencia lingüística. Kaciame al [phone].    We comply with applicable federal civil rights laws and [place] laws. We do not discriminate on the basis of race, color, national origin, age, disability, sex, sexual orientation, or gender identity.            Thank you!     Thank you for choosing [facility] DERMATOLOGY  for your care. Our goal is always to provide you with excellent care. Hearing back from our patients is one way we can continue to improve our services. Please take a few minutes to complete the written survey that you may receive in the mail after your visit with us. Thank you!             Your Updated Medication List - Protect others around you: Learn how to safely use, store and throw away your medicines at www.disposemymeds.org.          This list is accurate as of 7/24/18   3:33 PM.  Always use your most recent med list.                   Brand Name Dispense Instructions for use Diagnosis    esomeprazole 40 MG CR capsule    nexIUM    90 capsule    TAKE 1 CAPSULE DAILY 30 TO 60 MINUTES BEFORE EATING    Gastroesophageal reflux disease, esophagitis presence not specified       finasteride 5 MG tablet    PROSCAR    90 tablet    Take 1 tablet (5 mg) by mouth daily    Benign non-nodular prostatic hyperplasia with lower urinary tract symptoms       fluticasone 27.5 MCG/SPRAY spray    VERAMYST     Spray 1 spray into both nostrils daily        ketoconazole 2 % cream    NIZORAL    60 g    Apply topically daily Apply topically daily for 1 month    AK (actinic keratosis)       rosuvastatin 10 MG tablet    CRESTOR    90 tablet    Take 1 tablet (10 mg) by mouth daily    Hyperlipidemia LDL goal <130       valsartan-hydrochlorothiazide 320-25 MG per tablet    DIOVAN HCT    90 tablet    Take 1 tablet by mouth daily    Essential hypertension with goal blood pressure less than 140/90       VITAMIN D (CHOLECALCIFEROL) PO      Take 400 Units by mouth daily

## 2018-07-24 NOTE — NURSING NOTE
Dermatology Rooming Note    Jerry Miguel's goals for this visit include:   Chief Complaint   Patient presents with     Derm Problem     Jerry is here to have a spot on his chin looked at. He states that there are no other areas of concern at this time.      Rebeca Arora LPN

## 2018-07-24 NOTE — PATIENT INSTRUCTIONS

## 2018-07-24 NOTE — LETTER
"7/24/2018       RE: Jerry Miguel  [address]  [place] [address]     Dear Colleague,    Thank you for referring your patient, Jerry Miguel, to the [facility] DERMATOLOGY at [facility]. Please see a copy of my visit note below.    [facility] Dermatology Note      Dermatology Problem List:  1.Benign nevi removed    Encounter Date: Jul 24, 2018    CC:  Chief Complaint   Patient presents with     Derm Problem     Jerry is here to have a spot on his chin looked at. He states that there are no other areas of concern at this time.          History of Present Illness:  Mr. Jerry Miguel is a 73 year old male who with no history of skin cancer presents with a concern about an area in his beard that will grow and regress spontaneously. This spot grows out \"like a cone\" and he notes it has broken off previously as well. It is not painful or pruritic. He notes he also has a spot on the back of his left leg that he was told was benign previously but he cannot recall what the dermatologist called this spot.  States he had a lot of sun exposure in his youth and did not wear sunscreen. He does not wear sunscreen but typically wears a hat. Does not wear long sleeves or pants when it is warm out.    Past Medical History:   Patient Active Problem List   Diagnosis     Osteoarthritis of knee     CARDIOVASCULAR SCREENING; LDL GOAL LESS THAN 130     Seasonal allergic rhinitis     Hyperlipidemia LDL goal <130     BPH (benign prostatic hypertrophy)     GERD (gastroesophageal reflux disease)     Hyperthyroidism     Hypertension goal BP (blood pressure) < 140/90     Advanced care planning/counseling discussion     Right knee DJD     Past Medical History:   Diagnosis Date     Arthritis     knee     BPH (benign prostatic hyperplasia)      Chronic prostatitis      Gastro-oesophageal reflux disease      Hypertension      Kidney stone      Shingles      Thyroid disease 2001-about    " h/o hyperthyroid,treated, now normal     Past Surgical History:   Procedure Laterality Date     APPENDECTOMY       ARTHROPLASTY KNEE Right 1/4/2016    Procedure: ARTHROPLASTY KNEE;  Surgeon: Manuel Ponce MD;  Location: [facility] OR     COLONOSCOPY       COLONOSCOPY WITH CO2 INSUFFLATION N/A 3/2/2018    Procedure: COLONOSCOPY WITH CO2 INSUFFLATION;  Colonoscopy, Constipation, unspecified constipation type Recent change in frequency of bowel movements, [place], BMI 31.52, CVS [place];  Surgeon: Edith Craig MD;  Location: [facility] OR     ENT SURGERY       LASER HOLMIUM LITHOTRIPSY URETER(S), INSERT STENT, COMBINED  10/11/2012    Procedure: COMBINED CYSTOSCOPY, URETEROSCOPY, LASER HOLMIUM LITHOTRIPSY URETER(S), INSERT STENT;  CYSTOSCOPY, ATTEMPTED RIGHT URETEROSCOPY, INSERT RIGHT URETERAL STENT, Laser [place];  Surgeon: Petar Ulrich MD;  Location: [facility] OR       Social History:  The patient is retired.     Family History:  Not assessed today    Medications:  Current Outpatient Prescriptions   Medication Sig Dispense Refill     esomeprazole (NEXIUM) 40 MG CR capsule TAKE 1 CAPSULE DAILY 30 TO 60 MINUTES BEFORE EATING 90 capsule 3     finasteride (PROSCAR) 5 MG tablet Take 1 tablet (5 mg) by mouth daily 90 tablet 3     fluticasone (VERAMYST) 27.5 MCG/SPRAY spray Spray 1 spray into both nostrils daily       rosuvastatin (CRESTOR) 10 MG tablet Take 1 tablet (10 mg) by mouth daily 90 tablet 3     valsartan-hydrochlorothiazide (DIOVAN HCT) 320-25 MG per tablet Take 1 tablet by mouth daily 90 tablet 3     VITAMIN D, CHOLECALCIFEROL, PO Take 400 Units by mouth daily       Allergies   Allergen Reactions     Codeine Sulfate Nausea and Vomiting         Review of Systems:  -Constitutional: The patient denies fatigue, fevers, chills, unintended weight loss, and night sweats.  -Skin: As above in HPI. No additional skin concerns.    Physical exam:  Vitals: There were no vitals taken for this visit.  GEN: This is a  well developed, well-nourished male in no acute distress, in a pleasant mood.    SKIN: Waist-up skin, which includes the head/face, neck, both arms, chest, back, abdomen, digits and/or nails was examined.  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     Impression/Plan:  1. Cornu cutaneum- Etiology unclear so biopsy performed today to r/o verruca vulgaris vs SCC vs verruca vulgaris    Shave biopsy(performed by faculty): After discussion of benefits and risks including but not limited to bleeding, infection, scar, incomplete removal, recurrence, and non-diagnostic biopsy, written consent and photographs were obtained. Time-out was performed. The area was cleaned with isopropyl alcohol. 1.5 mL of 1% lidocaine was injected to obtain adequate anesthesia of the lesion on the left chin. A shave biopsy was performed. Hemostasis was achieved with electrocautery. The patient tolerated the procedure and no complications were noted. The patient was provided with verbal and written post care instructions.     2. Cherry angioma(s)    Benign nature was discussed.No further intervention required at this time.     3. Seborrheic keratosis, non irritated    Benign nature was discussed.No further intervention required at this time.     Follow-up in 1 year, earlier for new or changing lesions.     Staff Involved:  Scribed by Nicole Erickson, MS4 for Dr. Shemar Segal.    Staff Physician Comments:  I was present with the medical student who participated in the service and in the documentation of the note. I have verified the history and personally performed the physical exam and medical decision making. I agree with the assessment and plan as documented in the note. I have reviewed and if necessary amended the note.  I personally performed the procedure.      Shemar Segal MD  Professor  Head  of Dermato-Allergy Division  Department of Dermatology  [facility]

## 2018-07-24 NOTE — NURSING NOTE
Lidocaine 1 % injection   1.5mL once for one use, starting 7/24/2018 ending 7/24/2018,  2mL disp, R-0, injection  Injected by Rebeca Arora LPN

## 2018-07-30 LAB — COPATH REPORT: NORMAL

## 2018-08-02 DIAGNOSIS — I10 ESSENTIAL HYPERTENSION WITH GOAL BLOOD PRESSURE LESS THAN 140/90: ICD-10-CM

## 2018-08-02 RX ORDER — VALSARTAN AND HYDROCHLOROTHIAZIDE 320; 25 MG/1; MG/1
TABLET, FILM COATED ORAL
Qty: 30 TABLET | Refills: 0 | Status: SHIPPED | OUTPATIENT
Start: 2018-08-02 | End: 2018-09-18

## 2018-08-23 DIAGNOSIS — E78.5 HYPERLIPIDEMIA LDL GOAL <130: ICD-10-CM

## 2018-08-24 RX ORDER — ROSUVASTATIN CALCIUM 10 MG/1
TABLET, COATED ORAL
Qty: 30 TABLET | Refills: 0 | Status: SHIPPED | OUTPATIENT
Start: 2018-08-24 | End: 2018-09-05

## 2018-08-24 NOTE — TELEPHONE ENCOUNTER
"Medication is being filled for 1 time refill only due to:  Patient needs to be seen because due for fasting physical.   Aria YEAGER RN    Requested Prescriptions   Pending Prescriptions Disp Refills     rosuvastatin (CRESTOR) 10 MG tablet [Pharmacy Med Name: ROSUVASTATIN TABS 10MG] 90 tablet 3     Sig: TAKE 1 TABLET DAILY    Statins Protocol Failed    8/23/2018 12:25 AM       Failed - LDL on file in past 12 months    Recent Labs   Lab Test  08/21/17   1019   LDL  103*            Passed - No abnormal creatine kinase in past 12 months    No lab results found.            Passed - Recent (12 mo) or future (30 days) visit within the authorizing provider's specialty    Patient had office visit in the last 12 months or has a visit in the next 30 days with authorizing provider or within the authorizing provider's specialty.  See \"Patient Info\" tab in inbasket, or \"Choose Columns\" in Meds & Orders section of the refill encounter.           Passed - Patient is age 18 or older            "

## 2018-09-05 DIAGNOSIS — E78.5 HYPERLIPIDEMIA LDL GOAL <130: ICD-10-CM

## 2018-09-06 RX ORDER — ROSUVASTATIN CALCIUM 10 MG/1
TABLET, COATED ORAL
Qty: 30 TABLET | Refills: 0 | Status: SHIPPED | OUTPATIENT
Start: 2018-09-06 | End: 2018-09-18

## 2018-09-06 NOTE — TELEPHONE ENCOUNTER
"Requested Prescriptions   Pending Prescriptions Disp Refills     rosuvastatin (CRESTOR) 10 MG tablet [Pharmacy Med Name: ROSUVASTATIN TABS 10MG] 30 tablet 0     Sig: TAKE 1 TABLET DAILY (1 MONTH REFILL ONLY, DUE FOR APPOINTMENT FOR FASTING PHYSICAL)    Statins Protocol Failed    9/5/2018  3:25 PM       Failed - LDL on file in past 12 months    Recent Labs   Lab Test  08/21/17   1019   LDL  103*            Passed - No abnormal creatine kinase in past 12 months    No lab results found.            Passed - Recent (12 mo) or future (30 days) visit within the authorizing provider's specialty    Patient had office visit in the last 12 months or has a visit in the next 30 days with authorizing provider or within the authorizing provider's specialty.  See \"Patient Info\" tab in inbasket, or \"Choose Columns\" in Meds & Orders section of the refill encounter.           Passed - Patient is age 18 or older      Sent another 30 days, scheduled for Px and fasting labs this week with [facility]    "

## 2018-09-18 ENCOUNTER — OFFICE VISIT (OUTPATIENT)
Dept: FAMILY MEDICINE | Facility: CLINIC | Age: 73
End: 2018-09-18
Payer: MEDICARE

## 2018-09-18 VITALS
TEMPERATURE: 97.9 F | HEART RATE: 77 BPM | WEIGHT: 224.5 LBS | BODY MASS INDEX: 31.43 KG/M2 | OXYGEN SATURATION: 97 % | HEIGHT: 71 IN | DIASTOLIC BLOOD PRESSURE: 76 MMHG | SYSTOLIC BLOOD PRESSURE: 111 MMHG

## 2018-09-18 DIAGNOSIS — E78.5 HYPERLIPIDEMIA LDL GOAL <130: ICD-10-CM

## 2018-09-18 DIAGNOSIS — N40.1 BENIGN NON-NODULAR PROSTATIC HYPERPLASIA WITH LOWER URINARY TRACT SYMPTOMS: ICD-10-CM

## 2018-09-18 DIAGNOSIS — Z23 NEED FOR VACCINATION: ICD-10-CM

## 2018-09-18 DIAGNOSIS — I10 ESSENTIAL HYPERTENSION WITH GOAL BLOOD PRESSURE LESS THAN 140/90: ICD-10-CM

## 2018-09-18 DIAGNOSIS — Z00.00 ROUTINE HISTORY AND PHYSICAL EXAMINATION OF ADULT: Primary | ICD-10-CM

## 2018-09-18 DIAGNOSIS — K21.9 GASTROESOPHAGEAL REFLUX DISEASE, ESOPHAGITIS PRESENCE NOT SPECIFIED: ICD-10-CM

## 2018-09-18 PROCEDURE — 80053 COMPREHEN METABOLIC PANEL: CPT | Performed by: FAMILY MEDICINE

## 2018-09-18 PROCEDURE — G0439 PPPS, SUBSEQ VISIT: HCPCS | Performed by: FAMILY MEDICINE

## 2018-09-18 PROCEDURE — 80061 LIPID PANEL: CPT | Performed by: FAMILY MEDICINE

## 2018-09-18 PROCEDURE — 36415 COLL VENOUS BLD VENIPUNCTURE: CPT | Performed by: FAMILY MEDICINE

## 2018-09-18 PROCEDURE — G0008 ADMIN INFLUENZA VIRUS VAC: HCPCS | Performed by: FAMILY MEDICINE

## 2018-09-18 PROCEDURE — 90662 IIV NO PRSV INCREASED AG IM: CPT | Performed by: FAMILY MEDICINE

## 2018-09-18 RX ORDER — ROSUVASTATIN CALCIUM 10 MG/1
10 TABLET, COATED ORAL DAILY
Qty: 90 TABLET | Refills: 3 | Status: SHIPPED | OUTPATIENT
Start: 2018-09-18 | End: 2019-09-19

## 2018-09-18 RX ORDER — FINASTERIDE 5 MG/1
1 TABLET, FILM COATED ORAL DAILY
Qty: 90 TABLET | Refills: 3 | Status: SHIPPED | OUTPATIENT
Start: 2018-09-18 | End: 2019-09-19

## 2018-09-18 RX ORDER — ESOMEPRAZOLE MAGNESIUM 40 MG/1
CAPSULE, DELAYED RELEASE ORAL
Qty: 90 CAPSULE | Refills: 3 | Status: SHIPPED | OUTPATIENT
Start: 2018-09-18 | End: 2019-09-19

## 2018-09-18 RX ORDER — VALSARTAN AND HYDROCHLOROTHIAZIDE 320; 25 MG/1; MG/1
1 TABLET, FILM COATED ORAL DAILY
Qty: 90 TABLET | Refills: 3 | Status: SHIPPED | OUTPATIENT
Start: 2018-09-18 | End: 2019-09-19

## 2018-09-18 ASSESSMENT — ACTIVITIES OF DAILY LIVING (ADL)
CURRENT_FUNCTION: NO ASSISTANCE NEEDED
I_NEED_ASSISTANCE_FOR_THE_FOLLOWING_DAILY_ACTIVITIES:: NO ASSISTANCE IS NEEDED

## 2018-09-18 NOTE — PROGRESS NOTES
SUBJECTIVE:   Jerry Miguel is a 73 year old male who presents for Preventive Visit.      Are you in the first 12 months of your Medicare Part B coverage?  No    Answers for HPI/ROS submitted by the patient on 9/18/2018   Annual Exam:  Getting at least 3 servings of Calcium per day:: Yes  Bi-annual eye exam:: Yes  Dental care twice a year:: Yes  Sleep apnea or symptoms of sleep apnea:: None  Diet:: Regular (no restrictions)  Frequency of exercise:: 2-3 days/week  Taking medications regularly:: Yes  Medication side effects:: None  Additional concerns today:: No  Activities of Daily Living: no assistance needed  Home safety: no safety concerns identified  Hearing Impairment:: no hearing concerns  PHQ-2 Score: 0  Duration of exercise:: 30-45 minutes      COGNITIVE SCREEN  1) Repeat 3 items (Leader, Season, Table)    2) Clock draw: NORMAL  3) 3 item recall: Recalls 3 objects  Results: 3 items recalled: COGNITIVE IMPAIRMENT LESS LIKELY    Mini-CogTM Copyright RANDI Murillo. Licensed by the author for use in [facility]; reprinted with permission (ana@[facility]). All rights reserved.            PROBLEMS TO ADD ON...    Reviewed and updated as needed this visit by clinical staff  Tobacco  Allergies  Meds         Reviewed and updated as needed this visit by Provider        Social History   Substance Use Topics     Smoking status: Former Smoker     Types: Cigarettes     Smokeless tobacco: Never Used     Alcohol use 0.0 oz/week     0 Standard drinks or equivalent per week      Comment: 3x week       If you drink alcohol do you typically have >3 drinks per day or >7 drinks per week? No                        Today's PHQ-2 Score:   PHQ-2 ( 1999 Pf[employer]) 9/18/2018 8/21/2017   Q1: Little interest or pleasure in doing things 0 0   Q2: Feeling down, depressed or hopeless 0 0   PHQ-2 Score 0 0   Q1: Little interest or pleasure in doing things Not at all -   Q2: Feeling down, depressed or hopeless Not at all -   PHQ-2  "Score 0 -       Do you feel safe in your environment - Yes    Do you have a Health Care Directive?: No: Advance care planning reviewed with patient; information given to patient to review.    Current providers sharing in care for this patient include:   Patient Care Team:  Amaury Lyon MD as PCP - General (Family Practice)  Francoise Morales MD as MD (Ophthalmology)  Manuel Ponce MD as MD (Orthopaedic Surgery)    The following health maintenance items are reviewed in Epic and correct as of today:  Health Maintenance   Topic Date Due     ADVANCE DIRECTIVE PLANNING Q5 YRS  12/14/2017     BMP Q6 MOS  07/30/2018     LIPID MONITORING Q1 YEAR  08/21/2018     PHQ-2 Q1 YR  08/21/2018     INFLUENZA VACCINE (1) 09/01/2018     FALL RISK ASSESSMENT  08/21/2018     TSH Q1 YEAR  01/30/2019     CMP Q1 YR  01/30/2019     COLONOSCOPY Q3 YR  03/02/2021     TETANUS IMMUNIZATION (SYSTEM ASSIGNED)  11/25/2024     PNEUMOCOCCAL  Completed     AORTIC ANEURYSM SCREENING (SYSTEM ASSIGNED)  Completed     HEPATITIS C SCREENING  Completed     Labs reviewed in EPIC    Pneumonia Vaccine:Adults age 65+ who received Pneumovax (PPSV23) at 65 years or older: Should be given PCV13 > 1 year after their most recent PPSV23    ROS:    Constitutional: no recent illness, no fevers/sweats/chills  Eyes: No vision changes or eye irritation  Ears/Nose/Throat: No runny nose, sore throat or ear pain  CV: no palpitations, no chest pain, no lower extremity swelling.  Resp: no shortness of breath, wheezing, or cough.  GI: no nausea/vomiting/diarrhea, no black or bloody stools  : no burning or urgency with urination  Skin: no rash  Musculoskeletal: no joint pain, muscle pain, weakness  Psych: no depression, no concerns about anxiety  Neuro: no new headaches, dizziness, numbness, or tingling      OBJECTIVE:   /88 (BP Location: Right arm, Cuff Size: Adult Large)  Pulse 77  Temp 97.9  F (36.6  C) (Oral)  Ht 5' 10.5\" (1.791 m)  Wt " "224 lb 8 oz (101.8 kg)  SpO2 97%  BMI 31.76 kg/m2 Estimated body mass index is 31.76 kg/(m^2) as calculated from the following:    Height as of this encounter: 5' 10.5\" (1.791 m).    Weight as of this encounter: 224 lb 8 oz (101.8 kg).  EXAM:     General Appearance: Pleasant, alert, in no acute respiratory distress.  Head Exam: Normal. Normocephalic, atraumatic. No sinus tenderness.  Eye Exam: Normal external eye, conjunctiva, lids. ZHAO.  Ear Exam: Normal auditory canals and external ears. Non-tender.  Left TM-normal. Right TM-normal.  OroPharynx Exam: Dental hygiene adequate. Normal buccal mucosa. Normal pharynx.  Neck Exam: Supple, no masses or enlarged, tender nodes.  Thyroid Exam: No nodules or enlargement or tenderness.  Chest/Respiratory Exam: Normal, comfortable, easy respirations. Chest wall normal. Lungs are clear to auscultation. No wheezing, crackles, or rhonchi.  Cardiovascular Exam: Regular rate and rhythm. No murmur, gallop, or rubs. No pedal edema.  Gastrointestinal Exam: Soft, non-tender, no masses or organomegaly.  Musculoskeletal Exam: Back is non-tender, full ROM of upper and lower extremities.  Skin: no rash, warm and dry.    Neurologic Exam: Nonfocal, no tremor. Normal gait.  Psychiatric Exam: Alert - appropriate, normal affect      ASSESSMENT / PLAN:       ICD-10-CM    1. Routine history and physical examination of adult Z00.00    2. Gastroesophageal reflux disease, esophagitis presence not specified K21.9 esomeprazole (NEXIUM) 40 MG CR capsule   3. Benign non-nodular prostatic hyperplasia with lower urinary tract symptoms N40.1 finasteride (PROSCAR) 5 MG tablet   4. Hyperlipidemia LDL goal <130 E78.5 rosuvastatin (CRESTOR) 10 MG tablet   5. Essential hypertension with goal blood pressure less than 140/90 I10 valsartan-hydrochlorothiazide (DIOVAN-HCT) 320-25 MG per tablet     Lipid panel reflex to direct LDL Fasting     Comprehensive metabolic panel   6. Need for vaccination Z23 FLU VACCINE, " "INCREASED ANTIGEN, PRESV FREE       End of Life Planning:  Patient currently has an advanced directive: Yes.  Practitioner is supportive of decision.    COUNSELING:  Reviewed preventive health counseling, as reflected in patient instructions       Regular exercise       Healthy diet/nutrition       Vision screening       Hearing screening       Colon cancer screening       Prostate cancer screening   Discussed the risks and benefits of prostate cancer screening via PSA, current recommendation from the USPSTF is against screening for over 70  But this could be modified by individual risk or family history  Patient declined screening today      BP Readings from Last 1 Encounters:   09/18/18 131/88     Estimated body mass index is 31.76 kg/(m^2) as calculated from the following:    Height as of this encounter: 5' 10.5\" (1.791 m).    Weight as of this encounter: 224 lb 8 oz (101.8 kg).           reports that he has quit smoking. His smoking use included Cigarettes. He has never used smokeless tobacco.      Appropriate preventive services were discussed with this patient, including applicable screening as appropriate for cardiovascular disease, diabetes, osteopenia/osteoporosis, and glaucoma.  As appropriate for age/gender, discussed screening for colorectal cancer, prostate cancer, breast cancer, and cervical cancer. Checklist reviewing preventive services available has been given to the patient.    Reviewed patients plan of care and provided an AVS. The Intermediate Care Plan ( asthma action plan, low back pain action plan, and migraine action plan) for Jerry meets the Care Plan requirement. This Care Plan has been established and reviewed with the Patient.    Counseling Resources:  ATP IV Guidelines  Pooled Cohorts Equation Calculator  Breast Cancer Risk Calculator  FRAX Risk Assessment  ICSI Preventive Guidelines  Dietary Guidelines for Americans, 2010  USDA's MyPlate  ASA Prophylaxis  Lung CA Screening    Amaury " Alejandro Lyon MD  [facility]

## 2018-09-18 NOTE — MR AVS SNAPSHOT
After Visit Summary   9/18/2018    Jerry Miguel    MRN: [id number]           Patient Information     Date Of Birth          1945        Visit Information        Provider Department      9/18/2018 9:30 AM Amaury Lyon MD [facility]        Today's Diagnoses     Routine history and physical examination of adult    -  1    Gastroesophageal reflux disease, esophagitis presence not specified        Benign non-nodular prostatic hyperplasia with lower urinary tract symptoms        Hyperlipidemia LDL goal <130        Essential hypertension with goal blood pressure less than 140/90        Need for vaccination          Care Instructions      Preventive Health Recommendations:       Male Ages 65 and over    Yearly exam:             See your health care provider every year in order to  o   Review health changes.   o   Discuss preventive care.    o   Review your medicines if your doctor has prescribed any.    Talk with your health care provider about whether you should have a test to screen for prostate cancer (PSA).    Every 3 years, have a diabetes test (fasting glucose). If you are at risk for diabetes, you should have this test more often.    Every 5 years, have a cholesterol test. Have this test more often if you are at risk for high cholesterol or heart disease.     Every 10 years, have a colonoscopy. Or, have a yearly FIT test (stool test). These exams will check for colon cancer.    Talk to with your health care provider about screening for Abdominal Aortic Aneurysm if you have a family history of AAA or have a history of smoking.  Shots:     Get a flu shot each year.     Get a tetanus shot every 10 years.     Talk to your doctor about your pneumonia vaccines. There are now two you should receive - Pneumovax (PPSV 23) and Prevnar (PCV 13).    Talk to your pharmacist about a shingles vaccine.     Talk to your doctor about the hepatitis B vaccine.  Nutrition:     Eat at least 5  servings of fruits and vegetables each day.     Eat whole-grain bread, whole-wheat pasta and brown rice instead of white grains and rice.     Get adequate Calcium and Vitamin D.   Lifestyle    Exercise for at least 150 minutes a week (30 minutes a day, 5 days a week). This will help you control your weight and prevent disease.     Limit alcohol to one drink per day.     No smoking.     Wear sunscreen to prevent skin cancer.     See your dentist every six months for an exam and cleaning.     See your eye doctor every 1 to 2 years to screen for conditions such as glaucoma, macular degeneration and cataracts.          Follow-ups after your visit        Follow-up notes from your care team     Return in about 1 year (around 9/18/2019).      Who to contact     If you have questions or need follow up information about today's clinic visit or your schedule please contact [facility] directly at [phone].  Normal or non-critical lab and imaging results will be communicated to you by LeanAppshart, letter or phone within 4 business days after the clinic has received the results. If you do not hear from us within 7 days, please contact the clinic through Punch!t or phone. If you have a critical or abnormal lab result, we will notify you by phone as soon as possible.  Submit refill requests through edulio or call your pharmacy and they will forward the refill request to us. Please allow 3 business days for your refill to be completed.          Additional Information About Your Visit        MyChart Information     edulio gives you secure access to your electronic health record. If you see a primary care provider, you can also send messages to your care team and make appointments. If you have questions, please call your primary care clinic.  If you do not have a primary care provider, please call [phone] and they will assist you.        Care EveryWhere ID     This is your Care EveryWhere ID. This could be used by  "other organizations to access your [place] medical records  [id number]        Your Vitals Were     Pulse Temperature Height Pulse Oximetry BMI (Body Mass Index)       77 97.9  F (36.6  C) (Oral) 5' 10.5\" (1.791 m) 97% 31.76 kg/m2        Blood Pressure from Last 3 Encounters:   09/18/18 111/76   03/02/18 (!) 120/97   01/30/18 110/80    Weight from Last 3 Encounters:   09/18/18 224 lb 8 oz (101.8 kg)   02/26/18 225 lb (102.1 kg)   01/30/18 226 lb (102.5 kg)              We Performed the Following     Comprehensive metabolic panel     FLU VACCINE, INCREASED ANTIGEN, PRESV FREE     Lipid panel reflex to direct LDL Fasting          Today's Medication Changes          These changes are accurate as of 9/18/18  2:11 PM.  If you have any questions, ask your nurse or doctor.               These medicines have changed or have updated prescriptions.        Dose/Directions    rosuvastatin 10 MG tablet   Commonly known as:  CRESTOR   This may have changed:  See the new instructions.   Used for:  Hyperlipidemia LDL goal <130   Changed by:  Amaury Lyon MD        Dose:  10 mg   Take 1 tablet (10 mg) by mouth daily   Quantity:  90 tablet   Refills:  3       valsartan-hydrochlorothiazide 320-25 MG per tablet   Commonly known as:  DIOVAN-HCT   This may have changed:  See the new instructions.   Used for:  Essential hypertension with goal blood pressure less than 140/90   Changed by:  Amaury Lyon MD        Dose:  1 tablet   Take 1 tablet by mouth daily   Quantity:  90 tablet   Refills:  3            Where to get your medicines      These medications were sent to [facility] - [address]  [address] [facility] [address]     Phone:  [phone]     esomeprazole 40 MG CR capsule    finasteride 5 MG tablet    rosuvastatin 10 MG tablet    valsartan-hydrochlorothiazide 320-25 MG per tablet                Primary Care Provider Office Phone # Fax #    Amaury " Alejandro Lyon [phone]       [address] [facility] [address]        Equal Access to Services     GRACIA GUILLEN : Jose juan david pineda chris Ram, jen deangelogarfield, boris kaartem santos, nanci rocha shilpiemmie fernandez laisakfabby garay. So [facility] [phone].    ATENCIÓN: Si habla español, tiene a abel disposición servicios gratuitos de asistencia lingüística. Llame al [phone].    We comply with applicable federal civil rights laws and [place] laws. We do not discriminate on the basis of race, color, national origin, age, disability, sex, sexual orientation, or gender identity.            Thank you!     Thank you for choosing [facility]  for your care. Our goal is always to provide you with excellent care. Hearing back from our patients is one way we can continue to improve our services. Please take a few minutes to complete the written survey that you may receive in the mail after your visit with us. Thank you!             Your Updated Medication List - Protect others around you: Learn how to safely use, store and throw away your medicines at www.disposemymeds.org.          This list is accurate as of 9/18/18  2:11 PM.  Always use your most recent med list.                   Brand Name Dispense Instructions for use Diagnosis    esomeprazole 40 MG CR capsule    nexIUM    90 capsule    TAKE 1 CAPSULE DAILY 30 TO 60 MINUTES BEFORE EATING    Gastroesophageal reflux disease, esophagitis presence not specified       finasteride 5 MG tablet    PROSCAR    90 tablet    Take 1 tablet (5 mg) by mouth daily    Benign non-nodular prostatic hyperplasia with lower urinary tract symptoms       fluticasone 27.5 MCG/SPRAY spray    VERAMYST     Spray 1 spray into both nostrils daily        ketoconazole 2 % cream    NIZORAL    60 g    Apply topically daily Apply topically daily for 1 month    AK (actinic keratosis)       rosuvastatin 10 MG tablet    CRESTOR    90 tablet    Take 1 tablet (10  mg) by mouth daily    Hyperlipidemia LDL goal <130       valsartan-hydrochlorothiazide 320-25 MG per tablet    DIOVAN-HCT    90 tablet    Take 1 tablet by mouth daily    Essential hypertension with goal blood pressure less than 140/90       VITAMIN D (CHOLECALCIFEROL) PO      Take 400 Units by mouth daily

## 2018-09-19 LAB
ALBUMIN SERPL-MCNC: 4.2 G/DL (ref 3.4–5)
ALP SERPL-CCNC: 59 U/L (ref 40–150)
ALT SERPL W P-5'-P-CCNC: 36 U/L (ref 0–70)
ANION GAP SERPL CALCULATED.3IONS-SCNC: 8 MMOL/L (ref 3–14)
AST SERPL W P-5'-P-CCNC: 20 U/L (ref 0–45)
BILIRUB SERPL-MCNC: 0.9 MG/DL (ref 0.2–1.3)
BUN SERPL-MCNC: 20 MG/DL (ref 7–30)
CALCIUM SERPL-MCNC: 8.9 MG/DL (ref 8.5–10.1)
CHLORIDE SERPL-SCNC: 98 MMOL/L (ref 94–109)
CHOLEST SERPL-MCNC: 198 MG/DL
CO2 SERPL-SCNC: 34 MMOL/L (ref 20–32)
CREAT SERPL-MCNC: 1.22 MG/DL (ref 0.66–1.25)
GFR SERPL CREATININE-BSD FRML MDRD: 59 ML/MIN/1.7M2
GLUCOSE SERPL-MCNC: 93 MG/DL (ref 70–99)
HDLC SERPL-MCNC: 51 MG/DL
LDLC SERPL CALC-MCNC: 106 MG/DL
NONHDLC SERPL-MCNC: 147 MG/DL
POTASSIUM SERPL-SCNC: 3.6 MMOL/L (ref 3.4–5.3)
PROT SERPL-MCNC: 7.8 G/DL (ref 6.8–8.8)
SODIUM SERPL-SCNC: 140 MMOL/L (ref 133–144)
TRIGL SERPL-MCNC: 203 MG/DL

## 2018-12-03 ENCOUNTER — TELEPHONE (OUTPATIENT)
Dept: FAMILY MEDICINE | Facility: CLINIC | Age: 73
End: 2018-12-03

## 2018-12-03 ENCOUNTER — OFFICE VISIT (OUTPATIENT)
Dept: FAMILY MEDICINE | Facility: CLINIC | Age: 73
End: 2018-12-03
Payer: MEDICARE

## 2018-12-03 VITALS
BODY MASS INDEX: 32.06 KG/M2 | DIASTOLIC BLOOD PRESSURE: 74 MMHG | RESPIRATION RATE: 16 BRPM | OXYGEN SATURATION: 95 % | HEIGHT: 71 IN | SYSTOLIC BLOOD PRESSURE: 106 MMHG | WEIGHT: 229 LBS | HEART RATE: 92 BPM | TEMPERATURE: 97.9 F

## 2018-12-03 DIAGNOSIS — H66.002 ACUTE SUPPURATIVE OTITIS MEDIA OF LEFT EAR WITHOUT SPONTANEOUS RUPTURE OF TYMPANIC MEMBRANE, RECURRENCE NOT SPECIFIED: Primary | ICD-10-CM

## 2018-12-03 PROCEDURE — 99213 OFFICE O/P EST LOW 20 MIN: CPT | Performed by: FAMILY MEDICINE

## 2018-12-03 RX ORDER — AMOXICILLIN 875 MG
875 TABLET ORAL 2 TIMES DAILY
Qty: 20 TABLET | Refills: 0 | Status: SHIPPED | OUTPATIENT
Start: 2018-12-03 | End: 2018-12-28

## 2018-12-03 NOTE — PROGRESS NOTES
"  SUBJECTIVE:   Jerry Miguel is a 73 year old male who presents to clinic today for the following health issues:      Acute Illness   Acute illness concerns: cold  Onset: 7 days     Fever: YES    Chills/Sweats: YES    Headache (location?): YES    Sinus Pressure:YES    Conjunctivitis:  no    Ear Pain: no    Rhinorrhea: YES    Congestion: YES    Sore Throat: YES     Cough: YES - dry     Wheeze: YES    Decreased Appetite: YES    Nausea: YES    Vomiting: no     Diarrhea:  no     Dysuria/Freq.: no     Fatigue/Achiness: YES    Sick/Strep Exposure: YES     Therapies Tried and outcome: sinus rinse       Trend of symptoms: worsening  Denies These symptoms: fever, ear pain, myalgia    Review of symptoms except as noted in the HPI is otherwise negative.    MEDICATIONS  Current Outpatient Prescriptions   Medication Sig Dispense Refill     esomeprazole (NEXIUM) 40 MG CR capsule TAKE 1 CAPSULE DAILY 30 TO 60 MINUTES BEFORE EATING 90 capsule 3     finasteride (PROSCAR) 5 MG tablet Take 1 tablet (5 mg) by mouth daily 90 tablet 3     fluticasone (VERAMYST) 27.5 MCG/SPRAY spray Spray 1 spray into both nostrils daily       ketoconazole (NIZORAL) 2 % cream Apply topically daily Apply topically daily for 1 month 60 g 3     rosuvastatin (CRESTOR) 10 MG tablet Take 1 tablet (10 mg) by mouth daily 90 tablet 3     valsartan-hydrochlorothiazide (DIOVAN-HCT) 320-25 MG per tablet Take 1 tablet by mouth daily 90 tablet 3     VITAMIN D, CHOLECALCIFEROL, PO Take 400 Units by mouth daily       Allergies:    Allergies   Allergen Reactions     Codeine Sulfate Nausea and Vomiting       SOCIAL   reports that he has quit smoking. His smoking use included Cigarettes. He has never used smokeless tobacco.    OBJECTIVE:  /74  Pulse 92  Temp 97.9  F (36.6  C) (Oral)  Resp 16  Ht 5' 10.5\" (1.791 m)  Wt 229 lb (103.9 kg)  SpO2 95%  BMI 32.39 kg/m2  General appearance: healthy, alert and cooperative.  Left Ear: left tympanic membranes show " evidence of otitis media with erythema, dullness and bulging. Perforation is not noted.    Right Ear: normal; external ear canal and TM clear, nontender.  Nose: mucosal erythema  Sinuses: maxillary tenderness bilaterally  Oropharynx: normal pharynx and buccal mucosa. Dental hygeine adequate.  Neck: normal; supple with no masses or nodes.  Lungs: clear  ASSESSMENT/PLAN:    ICD-10-CM    1. Acute suppurative otitis media of left ear without spontaneous rupture of tympanic membrane, recurrence not specified H66.002 amoxicillin (AMOXIL) 875 MG tablet     Upper respiratory infection with acute otitis media as a complication    Instructions on use of tylenol or ibuprofen for relief of pain and fever we given.  Call or return to clinic if these symptoms worsen or fail to improve as anticipated.  Amaury Lyon MD MPH

## 2018-12-03 NOTE — TELEPHONE ENCOUNTER
Spoke with patient.  Productive cough, sinus pressure, chills/sweats x 1 week.  Wife same symptoms.  Scheduled patient at 1:00 and his wife at 1:30.  Mary Arias RN

## 2018-12-03 NOTE — MR AVS SNAPSHOT
After Visit Summary   12/3/2018    Jerry Miguel    MRN: [id number]           Patient Information     Date Of Birth          1945        Visit Information        Provider Department      12/3/2018 1:00 PM Amaury Lyon MD [facility]        Today's Diagnoses     Acute suppurative otitis media of left ear without spontaneous rupture of tympanic membrane, recurrence not specified    -  1       Follow-ups after your visit        Follow-up notes from your care team     Return in about 6 months (around 6/3/2019) for Physical Exam.      Who to contact     If you have questions or need follow up information about today's clinic visit or your schedule please contact [facility] directly at [phone].  Normal or non-critical lab and imaging results will be communicated to you by [employer], letter or phone within 4 business days after the clinic has received the results. If you do not hear from us within 7 days, please contact the clinic through [employer] or phone. If you have a critical or abnormal lab result, we will notify you by phone as soon as possible.  Submit refill requests through [employer] or call your pharmacy and they will forward the refill request to us. Please allow 3 business days for your refill to be completed.          Additional Information About Your Visit        MyChart Information     [employer] gives you secure access to your electronic health record. If you see a primary care provider, you can also send messages to your care team and make appointments. If you have questions, please call your primary care clinic.  If you do not have a primary care provider, please call [phone] and they will assist you.        Care EveryWhere ID     This is your Care EveryWhere ID. This could be used by other organizations to access your [place] medical records  [id number]        Your Vitals Were     Pulse Temperature Respirations Height Pulse Oximetry BMI (Body  "Mass Index)    92 97.9  F (36.6  C) (Oral) 16 5' 10.5\" (1.791 m) 95% 32.39 kg/m2       Blood Pressure from Last 3 Encounters:   12/03/18 106/74   09/18/18 111/76   03/02/18 (!) 120/97    Weight from Last 3 Encounters:   12/03/18 229 lb (103.9 kg)   09/18/18 224 lb 8 oz (101.8 kg)   02/26/18 225 lb (102.1 kg)              Today, you had the following     No orders found for display         Today's Medication Changes          These changes are accurate as of 12/3/18  2:06 PM.  If you have any questions, ask your nurse or doctor.               Start taking these medicines.        Dose/Directions    amoxicillin 875 MG tablet   Commonly known as:  AMOXIL   Used for:  Acute suppurative otitis media of left ear without spontaneous rupture of tympanic membrane, recurrence not specified   Started by:  Amaury Lyon MD        Dose:  875 mg   Take 1 tablet (875 mg) by mouth 2 times daily   Quantity:  20 tablet   Refills:  0            Where to get your medicines      These medications were sent to [facility]/pharmacy #[id number] [place] [id number]     Phone:  [phone]     amoxicillin 875 MG tablet                Primary Care Provider Office Phone # Fax #    Amaury Alejandro Lyon [phone] [address] [facility] [address]        Equal Access to Services     [facility] WILMER AH: Hadii juan david pineda hadasho Soomaali, waaxda luqadaha, qaybta kaalmada adeegyada, waxay kelle riley . So [facility] [phone].    ATENCIÓN: Si habla zaid, tiene a abel disposición servicios gratuitos de asistencia lingüística. Llame al [phone].    We comply with applicable federal civil rights laws and [place] laws. We do not discriminate on the basis of race, color, national origin, age, disability, sex, sexual orientation, or gender identity.            Thank you!     Thank you for choosing [facility]  for your care. Our goal " is always to provide you with excellent care. Hearing back from our patients is one way we can continue to improve our services. Please take a few minutes to complete the written survey that you may receive in the mail after your visit with us. Thank you!             Your Updated Medication List - Protect others around you: Learn how to safely use, store and throw away your medicines at www.disposemymeds.org.          This list is accurate as of 12/3/18  2:06 PM.  Always use your most recent med list.                   Brand Name Dispense Instructions for use Diagnosis    amoxicillin 875 MG tablet    AMOXIL    20 tablet    Take 1 tablet (875 mg) by mouth 2 times daily    Acute suppurative otitis media of left ear without spontaneous rupture of tympanic membrane, recurrence not specified       esomeprazole 40 MG DR capsule    nexIUM    90 capsule    TAKE 1 CAPSULE DAILY 30 TO 60 MINUTES BEFORE EATING    Gastroesophageal reflux disease, esophagitis presence not specified       finasteride 5 MG tablet    PROSCAR    90 tablet    Take 1 tablet (5 mg) by mouth daily    Benign non-nodular prostatic hyperplasia with lower urinary tract symptoms       fluticasone 27.5 MCG/SPRAY nasal spray    VERAMYST     Spray 1 spray into both nostrils daily        ketoconazole 2 % external cream    NIZORAL    60 g    Apply topically daily Apply topically daily for 1 month    AK (actinic keratosis)       rosuvastatin 10 MG tablet    CRESTOR    90 tablet    Take 1 tablet (10 mg) by mouth daily    Hyperlipidemia LDL goal <130       valsartan-hydrochlorothiazide 320-25 MG tablet    DIOVAN HCT    90 tablet    Take 1 tablet by mouth daily    Essential hypertension with goal blood pressure less than 140/90       VITAMIN D (CHOLECALCIFEROL) PO      Take 400 Units by mouth daily

## 2018-12-03 NOTE — TELEPHONE ENCOUNTER
Reason for call:  Patient reporting a symptom    Symptom or request: Slight fever, body aches and chills, productive cough,     Duration (how long have symptoms been present): 1 week     Have you been treated for this before? No    Additional comments:     Phone Number patient can be reached at:  Home number on file [phone] (home)    Best Time:  any    Can we leave a detailed message on this number:  YES    Call taken on 12/3/2018 at 8:19 AM by Bridgette Lopez

## 2018-12-12 ENCOUNTER — E-VISIT (OUTPATIENT)
Dept: FAMILY MEDICINE | Facility: CLINIC | Age: 73
End: 2018-12-12
Payer: MEDICARE

## 2018-12-12 DIAGNOSIS — H69.92 DYSFUNCTION OF LEFT EUSTACHIAN TUBE: Primary | ICD-10-CM

## 2018-12-12 PROCEDURE — 99444 ZZC PHYSICIAN ONLINE EVALUATION & MANAGEMENT SERVICE: CPT | Performed by: FAMILY MEDICINE

## 2018-12-26 ENCOUNTER — E-VISIT (OUTPATIENT)
Dept: FAMILY MEDICINE | Facility: CLINIC | Age: 73
End: 2018-12-26
Payer: MEDICARE

## 2018-12-26 DIAGNOSIS — Z53.9 ERRONEOUS ENCOUNTER--DISREGARD: Primary | ICD-10-CM

## 2018-12-28 ENCOUNTER — OFFICE VISIT (OUTPATIENT)
Dept: FAMILY MEDICINE | Facility: CLINIC | Age: 73
End: 2018-12-28
Payer: MEDICARE

## 2018-12-28 VITALS
DIASTOLIC BLOOD PRESSURE: 80 MMHG | WEIGHT: 225.31 LBS | RESPIRATION RATE: 16 BRPM | BODY MASS INDEX: 31.87 KG/M2 | HEART RATE: 105 BPM | TEMPERATURE: 97.9 F | OXYGEN SATURATION: 97 % | SYSTOLIC BLOOD PRESSURE: 126 MMHG

## 2018-12-28 DIAGNOSIS — J40 BRONCHITIS: Primary | ICD-10-CM

## 2018-12-28 DIAGNOSIS — B37.0 THRUSH: ICD-10-CM

## 2018-12-28 PROCEDURE — 99214 OFFICE O/P EST MOD 30 MIN: CPT | Performed by: FAMILY MEDICINE

## 2018-12-28 RX ORDER — CLOTRIMAZOLE 10 MG/1
10 LOZENGE ORAL
Qty: 50 TROCHE | Refills: 0 | Status: SHIPPED | OUTPATIENT
Start: 2018-12-28 | End: 2019-01-07

## 2018-12-28 RX ORDER — DOXYCYCLINE HYCLATE 100 MG
100 TABLET ORAL 2 TIMES DAILY
Qty: 14 TABLET | Refills: 0 | Status: SHIPPED | OUTPATIENT
Start: 2018-12-28 | End: 2019-01-04

## 2018-12-28 ASSESSMENT — PAIN SCALES - GENERAL: PAINLEVEL: NO PAIN (0)

## 2018-12-28 NOTE — PROGRESS NOTES
SUBJECTIVE:   Jerry Miguel is a 73 year old male who presents to clinic today for the following health issues:      RESPIRATORY SYMPTOMS Follow up      Duration: 5 weeks ago     Description  ear pain left    Severity: mild    Accompanying signs and symptoms: None    History (predisposing factors):  Had Flu and still have thrush and sinus infection    Precipitating or alleviating factors: None    Therapies tried and outcome:  AMOX  Improved for a bit but didn't get rid of it    Also some thruch perhaps due to amox?    Ill contacts:  others at home with similar illness  Denies These symptoms(Neg ROS): fever, Nausea/Vomiting/Diarrhea, rash, wheezing.  Review of symptoms except as noted in the HPI is otherwise negative.    MEDICATIONS  Current Outpatient Medications   Medication Sig Dispense Refill     clotrimazole 10 MG juliet Take 1 Juliet (10 mg) by mouth 5 times daily for 10 days 50 Juliet 0     esomeprazole (NEXIUM) 40 MG CR capsule TAKE 1 CAPSULE DAILY 30 TO 60 MINUTES BEFORE EATING 90 capsule 3     finasteride (PROSCAR) 5 MG tablet Take 1 tablet (5 mg) by mouth daily 90 tablet 3     fluticasone (VERAMYST) 27.5 MCG/SPRAY spray Spray 1 spray into both nostrils daily       ketoconazole (NIZORAL) 2 % cream Apply topically daily Apply topically daily for 1 month 60 g 3     rosuvastatin (CRESTOR) 10 MG tablet Take 1 tablet (10 mg) by mouth daily 90 tablet 3     valsartan-hydrochlorothiazide (DIOVAN-HCT) 320-25 MG per tablet Take 1 tablet by mouth daily 90 tablet 3     VITAMIN D, CHOLECALCIFEROL, PO Take 400 Units by mouth daily       Allergies:    Allergies   Allergen Reactions     Codeine Sulfate Nausea and Vomiting       SOCIAL   reports that he has quit smoking. His smoking use included cigarettes. he has never used smokeless tobacco.  OBJECTIVE:  /80   Pulse 105   Temp 97.9  F (36.6  C) (Oral)   Resp 16   Wt 102.2 kg (225 lb 5 oz)   SpO2 97%   BMI 31.87 kg/m    General appearance: healthy, alert and  cooperative.  Left Ear: normal; external ear canal and TM clear, nontender.  Right Ear: normal; external ear canal and TM clear, nontender.  Nose: clear rhinorrhea  Sinuses: normal; sinuses nontender  Oropharynx: mild erythema  White plaques on tongue  Neck: few small nontender anterior cervical nodes  Lungs: normal chest wall and respirations; clear to auscultation.    ASSESSMENT/PLAN:    ICD-10-CM    1. Bronchitis J40 doxycycline hyclate (VIBRA-TABS) 100 MG tablet   2. Thrush B37.0 clotrimazole 10 MG erik     Try different abx spectrum    Instructions on use of OTC medications given  Call or return to clinic if these symptoms worsen or fail to improve as anticipated.    Amaury Lyon MD MPH

## 2019-05-29 ENCOUNTER — MEDICAL CORRESPONDENCE (OUTPATIENT)
Dept: HEALTH INFORMATION MANAGEMENT | Facility: CLINIC | Age: 74
End: 2019-05-29

## 2019-06-07 ENCOUNTER — HOSPITAL ENCOUNTER (OUTPATIENT)
Facility: CLINIC | Age: 74
Discharge: HOME | End: 2019-06-07
Attending: INTERNAL MEDICINE | Admitting: INTERNAL MEDICINE
Payer: MEDICARE

## 2019-06-07 VITALS — WEIGHT: 226 LBS | BODY MASS INDEX: 32.35 KG/M2 | HEIGHT: 70 IN

## 2019-06-07 PROCEDURE — 91065 BREATH HYDROGEN/METHANE TEST: CPT | Performed by: INTERNAL MEDICINE

## 2019-06-07 ASSESSMENT — MIFFLIN-ST. JEOR: SCORE: 1786.38

## 2019-06-07 NOTE — DISCHARGE INSTRUCTIONS
Hydrogen Breath Test Discharge Instructions    1. You may experience diarrhea for the next 12-24 hours.  2. Resume a regular diet.  3. Follow-up with your referring doctor in clinic.  4. If you have questions call [phone] from 7:00am-4:30pm     Maria Luz Price RN

## 2019-06-10 ENCOUNTER — MYC REFILL (OUTPATIENT)
Dept: FAMILY MEDICINE | Facility: CLINIC | Age: 74
End: 2019-06-10

## 2019-06-10 DIAGNOSIS — I10 ESSENTIAL HYPERTENSION WITH GOAL BLOOD PRESSURE LESS THAN 140/90: ICD-10-CM

## 2019-06-10 RX ORDER — VALSARTAN AND HYDROCHLOROTHIAZIDE 320; 25 MG/1; MG/1
1 TABLET, FILM COATED ORAL DAILY
Qty: 90 TABLET | Refills: 3 | Status: CANCELLED | OUTPATIENT
Start: 2019-06-10

## 2019-09-15 DIAGNOSIS — I10 ESSENTIAL HYPERTENSION WITH GOAL BLOOD PRESSURE LESS THAN 140/90: ICD-10-CM

## 2019-09-16 RX ORDER — VALSARTAN AND HYDROCHLOROTHIAZIDE 320; 25 MG/1; MG/1
TABLET, FILM COATED ORAL
Start: 2019-09-16

## 2019-09-16 NOTE — TELEPHONE ENCOUNTER
"Due for physical.  Will be addressed at 9/19 OV.  Mary Arias RN    Requested Prescriptions   Pending Prescriptions Disp Refills     valsartan-hydrochlorothiazide (DIOVAN HCT) 320-25 MG tablet [Pharmacy Med Name: VALSARTAN/HCTZ TABS 320/25] 90 tablet 4     Sig: TAKE 1 TABLET DAILY       Angiotensin-II Receptors Passed - 9/15/2019  6:45 AM        Passed - Last blood pressure under 140/90 in past 12 months     BP Readings from Last 3 Encounters:   12/28/18 126/80   12/03/18 106/74   09/18/18 111/76                 Passed - Recent (12 mo) or future (30 days) visit within the authorizing provider's specialty     Patient had office visit in the last 12 months or has a visit in the next 30 days with authorizing provider or within the authorizing provider's specialty.  See \"Patient Info\" tab in inbasket, or \"Choose Columns\" in Meds & Orders section of the refill encounter.              Passed - Medication is active on med list        Passed - Patient is age 18 or older        Passed - Normal serum creatinine on file in past 12 months     Recent Labs   Lab Test 09/18/18  1026   CR 1.22             Passed - Normal serum potassium on file in past 12 months     Recent Labs   Lab Test 09/18/18  1026   POTASSIUM 3.6                    "

## 2019-09-17 ASSESSMENT — ACTIVITIES OF DAILY LIVING (ADL): CURRENT_FUNCTION: NO ASSISTANCE NEEDED

## 2019-09-19 ENCOUNTER — OFFICE VISIT (OUTPATIENT)
Dept: FAMILY MEDICINE | Facility: CLINIC | Age: 74
End: 2019-09-19
Payer: MEDICARE

## 2019-09-19 VITALS
RESPIRATION RATE: 16 BRPM | HEART RATE: 92 BPM | BODY MASS INDEX: 31.02 KG/M2 | HEIGHT: 72 IN | SYSTOLIC BLOOD PRESSURE: 125 MMHG | DIASTOLIC BLOOD PRESSURE: 80 MMHG | OXYGEN SATURATION: 96 % | TEMPERATURE: 97.6 F | WEIGHT: 229 LBS

## 2019-09-19 DIAGNOSIS — Z23 NEED FOR VACCINATION: ICD-10-CM

## 2019-09-19 DIAGNOSIS — I10 ESSENTIAL HYPERTENSION WITH GOAL BLOOD PRESSURE LESS THAN 140/90: ICD-10-CM

## 2019-09-19 DIAGNOSIS — N18.30 CKD (CHRONIC KIDNEY DISEASE) STAGE 3, GFR 30-59 ML/MIN (H): ICD-10-CM

## 2019-09-19 DIAGNOSIS — Z00.00 ENCOUNTER FOR MEDICARE ANNUAL WELLNESS EXAM: Primary | ICD-10-CM

## 2019-09-19 DIAGNOSIS — E78.5 HYPERLIPIDEMIA LDL GOAL <130: ICD-10-CM

## 2019-09-19 DIAGNOSIS — F33.8 SEASONAL DEPRESSION (H): ICD-10-CM

## 2019-09-19 DIAGNOSIS — N40.1 BENIGN NON-NODULAR PROSTATIC HYPERPLASIA WITH LOWER URINARY TRACT SYMPTOMS: ICD-10-CM

## 2019-09-19 DIAGNOSIS — K21.9 GASTROESOPHAGEAL REFLUX DISEASE, ESOPHAGITIS PRESENCE NOT SPECIFIED: ICD-10-CM

## 2019-09-19 LAB
ANION GAP SERPL CALCULATED.3IONS-SCNC: 7 MMOL/L (ref 3–14)
BUN SERPL-MCNC: 21 MG/DL (ref 7–30)
CALCIUM SERPL-MCNC: 9.4 MG/DL (ref 8.5–10.1)
CHLORIDE SERPL-SCNC: 103 MMOL/L (ref 94–109)
CHOLEST SERPL-MCNC: 195 MG/DL
CO2 SERPL-SCNC: 29 MMOL/L (ref 20–32)
CREAT SERPL-MCNC: 1.11 MG/DL (ref 0.66–1.25)
GFR SERPL CREATININE-BSD FRML MDRD: 66 ML/MIN/{1.73_M2}
GLUCOSE SERPL-MCNC: 91 MG/DL (ref 70–99)
HDLC SERPL-MCNC: 47 MG/DL
LDLC SERPL CALC-MCNC: 103 MG/DL
NONHDLC SERPL-MCNC: 148 MG/DL
POTASSIUM SERPL-SCNC: 3.6 MMOL/L (ref 3.4–5.3)
SODIUM SERPL-SCNC: 139 MMOL/L (ref 133–144)
TRIGL SERPL-MCNC: 225 MG/DL

## 2019-09-19 PROCEDURE — 90662 IIV NO PRSV INCREASED AG IM: CPT | Performed by: FAMILY MEDICINE

## 2019-09-19 PROCEDURE — 80048 BASIC METABOLIC PNL TOTAL CA: CPT | Performed by: FAMILY MEDICINE

## 2019-09-19 PROCEDURE — 36415 COLL VENOUS BLD VENIPUNCTURE: CPT | Performed by: FAMILY MEDICINE

## 2019-09-19 PROCEDURE — G0008 ADMIN INFLUENZA VIRUS VAC: HCPCS | Performed by: FAMILY MEDICINE

## 2019-09-19 PROCEDURE — 80061 LIPID PANEL: CPT | Performed by: FAMILY MEDICINE

## 2019-09-19 PROCEDURE — 99213 OFFICE O/P EST LOW 20 MIN: CPT | Mod: 25 | Performed by: FAMILY MEDICINE

## 2019-09-19 PROCEDURE — G0439 PPPS, SUBSEQ VISIT: HCPCS | Performed by: FAMILY MEDICINE

## 2019-09-19 RX ORDER — DULOXETIN HYDROCHLORIDE 30 MG/1
30 CAPSULE, DELAYED RELEASE ORAL DAILY
Qty: 30 CAPSULE | Refills: 3 | Status: SHIPPED | OUTPATIENT
Start: 2019-09-19 | End: 2019-11-04

## 2019-09-19 RX ORDER — VALSARTAN AND HYDROCHLOROTHIAZIDE 320; 25 MG/1; MG/1
1 TABLET, FILM COATED ORAL DAILY
Qty: 90 TABLET | Refills: 3 | Status: SHIPPED | OUTPATIENT
Start: 2019-09-19 | End: 2020-09-14

## 2019-09-19 RX ORDER — ROSUVASTATIN CALCIUM 10 MG/1
10 TABLET, COATED ORAL DAILY
Qty: 90 TABLET | Refills: 3 | Status: SHIPPED | OUTPATIENT
Start: 2019-09-19 | End: 2020-09-14

## 2019-09-19 RX ORDER — ESOMEPRAZOLE MAGNESIUM 40 MG/1
CAPSULE, DELAYED RELEASE ORAL
Qty: 90 CAPSULE | Refills: 3 | Status: SHIPPED | OUTPATIENT
Start: 2019-09-19 | End: 2020-09-14

## 2019-09-19 RX ORDER — FINASTERIDE 5 MG/1
1 TABLET, FILM COATED ORAL DAILY
Qty: 90 TABLET | Refills: 3 | Status: SHIPPED | OUTPATIENT
Start: 2019-09-19 | End: 2020-09-14

## 2019-09-19 ASSESSMENT — PATIENT HEALTH QUESTIONNAIRE - PHQ9
5. POOR APPETITE OR OVEREATING: NOT AT ALL
SUM OF ALL RESPONSES TO PHQ QUESTIONS 1-9: 2

## 2019-09-19 ASSESSMENT — ANXIETY QUESTIONNAIRES
5. BEING SO RESTLESS THAT IT IS HARD TO SIT STILL: NOT AT ALL
2. NOT BEING ABLE TO STOP OR CONTROL WORRYING: NOT AT ALL
6. BECOMING EASILY ANNOYED OR IRRITABLE: SEVERAL DAYS
GAD7 TOTAL SCORE: 2
IF YOU CHECKED OFF ANY PROBLEMS ON THIS QUESTIONNAIRE, HOW DIFFICULT HAVE THESE PROBLEMS MADE IT FOR YOU TO DO YOUR WORK, TAKE CARE OF THINGS AT HOME, OR GET ALONG WITH OTHER PEOPLE: NOT DIFFICULT AT ALL
7. FEELING AFRAID AS IF SOMETHING AWFUL MIGHT HAPPEN: NOT AT ALL
3. WORRYING TOO MUCH ABOUT DIFFERENT THINGS: NOT AT ALL
1. FEELING NERVOUS, ANXIOUS, OR ON EDGE: SEVERAL DAYS

## 2019-09-19 ASSESSMENT — ACTIVITIES OF DAILY LIVING (ADL): CURRENT_FUNCTION: NO ASSISTANCE NEEDED

## 2019-09-19 ASSESSMENT — MIFFLIN-ST. JEOR: SCORE: 1826.74

## 2019-09-19 NOTE — PROGRESS NOTES
"SUBJECTIVE:   Jerry Miguel is a 74 year old male who presents for Preventive Visit.      Are you in the first 12 months of your Medicare coverage?  No    Healthy Habits:     In general, how would you rate your overall health?  Good    Frequency of exercise:  6-7 days/week    Duration of exercise:  30-45 minutes    Do you usually eat at least 4 servings of fruit and vegetables a day, include whole grains    & fiber and avoid regularly eating high fat or \"junk\" foods?  Yes    Taking medications regularly:  Yes    Ability to successfully perform activities of daily living:  No assistance needed    Home Safety:  Lack of grab bars in the bathroom    Hearing Impairment:  No hearing concerns    In the past 6 months, have you been bothered by leaking of urine?  No    In general, how would you rate your overall mental or emotional health?  Good      PHQ-2 Total Score: 1    Additional concerns today:  No    in addition to health maintenance patient would like to discuss the following problem:    Wonders about seasonal depression  Patient has been having mild to moderate symptoms of deprssion as the seasons change  Onset: many years  Course: maybe worsening in longterm  Current symptoms are described by the PHQ9/JAMES below.  PHQ-9 SCORE 9/19/2019   PHQ-9 Total Score 2     JAMES-7 SCORE 9/19/2019   Total Score 2     Previous treatment modalities employed include lighting, exercise ([place])    No known medical causes of depression or Anxiety.    Problem pert ROS:  Musculoskeletal: Normal; movements are symmetrical, no weakness  Neuro: Normal; no tremor  Psych: No suicidal thoughts or plan. No halluciations.  Psych exam:  GROOMING: Adequately groomed.  ATTIRE: Appropriate.  AGE: Appears stated.  BEHAVIOR TOWARDS EXAMINER: Cooperative.  MOTOR ACTIVITY: Unremarkable.  EYE CONTACT: Appropriate.  Mood:  good  Affect:  appropriate and in normal range  SPEECH/LANGUAGE: Unremarkable.  ATTENTION: Unremarkable.  CONCENTRATION: " Unremarkable.  THOUGHT PROCESS: Unremarkable  THOUGHT CONTENT: Unremarkable for hallucinations and delusions.  ORIENTATION: Times 3.  MEMORY: No evidence of impairment.  JUDGMENT: No evidence of impairment.  ESTIMATED INTELLIGENCE: Average.  DEMONSTRATED INSIGHT: Adequate.  FUND OF KNOWLEDGE: Adequate.  SUICIDE RISK: None apparent and denied.  Neck: normal thyriod exam    Discussed a trial of cymbalta, he is hoping it with hel some with his arthritis pain and his daughter also liked it    Patient has stable chronic conditions as noted in A/P including   CKD (chronic kidney disease) stage 3, GFR 30-59 ml/min (H), Hyperlipidemia LDL goal <130, Essential hypertension with goal blood pressure less than 140/90, Gastroesophageal reflux disease, esophagitis presence not specified, Benign non-nodular prostatic hyperplasia with lower urinary tract symptoms,  were also pertinent to this visit.    These diagnoses were each reviewed for stability and medications were reviewed and refilled, labs ordered and updated.        Reviewed risks and benefits of medications and other treatment options.    Follow up appointment made today for within 30 days, return sooner if symptoms worsen.        Do you feel safe in your environment? Yes    Do you have a Health Care Directive? Yes: Patient states has Advance Directive and will bring in a copy to clinic.      Fall risk     click delete button to remove this line now  Cognitive Screening   1) Repeat 3 items (Leader, Season, Table)    2) Clock draw: normal  3) 3 item recall: Recalls 3 objects  Results: 3 items recalled: COGNITIVE IMPAIRMENT LESS LIKELY    Mini-CogTM Copyright S hCidi. Licensed by the author for use in [facility] [place]; reprinted with permission (ana@.[facility]). All rights reserved.      Do you have sleep apnea, excessive snoring or daytime drowsiness?: yes    Reviewed and updated as needed this visit by clinical staff  Tobacco  Allergies         Reviewed and  updated as needed this visit by Provider        Social History     Tobacco Use     Smoking status: Former Smoker     Types: Cigarettes     Smokeless tobacco: Never Used   Substance Use Topics     Alcohol use: Yes     Alcohol/week: 0.0 oz     Comment: 3x week     If you drink alcohol do you typically have >3 drinks per day or >7 drinks per week? No    Alcohol Use 9/17/2019   Prescreen: >3 drinks/day or >7 drinks/week? No   Prescreen: >3 drinks/day or >7 drinks/week? -   No flowsheet data found.      PROBLEMS TO ADD ON...    Current providers sharing in care for this patient include:   Patient Care Team:  Amaury Lyon MD as PCP - General (Family Practice)  Francoise Morales MD as MD (Ophthalmology)  Manuel Ponce MD as MD (Orthopaedic Surgery)  Amaury Lyon MD as Assigned PCP    The following health maintenance items are reviewed in Epic and correct as of today:  Health Maintenance   Topic Date Due     ZOSTER IMMUNIZATION (1 of 2) 07/08/1997     ADVANCE CARE PLANNING  12/14/2017     FALL RISK ASSESSMENT  08/21/2018     TSH W/FREE T4 REFLEX  01/30/2019     BMP  03/18/2019     INFLUENZA VACCINE (1) 09/01/2019     CMP  09/18/2019     LIPID  09/18/2019     MEDICARE ANNUAL WELLNESS VISIT  09/18/2019     COLONOSCOPY  03/02/2021     DTAP/TDAP/TD IMMUNIZATION (2 - Td) 11/25/2024     HEPATITIS C SCREENING  Completed     PHQ-2  Completed     PNEUMOCOCCAL IMMUNIZATION 65+ LOW/MEDIUM RISK  Completed     AORTIC ANEURYSM SCREENING (SYSTEM ASSIGNED)  Completed     IPV IMMUNIZATION  Aged Out     MENINGITIS IMMUNIZATION  Aged Out     Labs reviewed in EPIC  Pneumonia Vaccine:Adults age 65+ who received Pneumovax (PPSV23) at 65 years or older: Should be given PCV13 > 1 year after their most recent PPSV23    Review of Systems    Constitutional: no recent illness, no fevers/sweats/chills  Eyes: No vision changes or eye irritation  Ears/Nose/Throat: No runny nose, sore throat or ear pain  CV: no  "palpitations, no chest pain, no lower extremity swelling.  Resp: no shortness of breath, wheezing, or cough.  GI: no nausea/vomiting/diarrhea, no black or bloody stools  : no burning or urgency with urination  Skin: no rash  Musculoskeletal: + arthrtis  Psych: no depression, no concerns about anxiety  Neuro: no new headaches, dizziness, numbness, or tingling      OBJECTIVE:   /80 (BP Location: Right arm, Cuff Size: Adult Large)   Pulse 92   Temp 97.6  F (36.4  C) (Oral)   Resp 16   Ht 1.829 m (6')   Wt 103.9 kg (229 lb)   SpO2 96%   BMI 31.06 kg/m   Estimated body mass index is 32.43 kg/m  as calculated from the following:    Height as of 6/7/19: 1.778 m (5' 10\").    Weight as of 6/7/19: 102.5 kg (226 lb).  Physical Exam    General Appearance: Pleasant, alert, in no acute respiratory distress.  Head Exam: Normal. Normocephalic, atraumatic. No sinus tenderness.  Eye Exam: Normal external eye, conjunctiva, lids. ZHAO.  Ear Exam: Normal auditory canals and external ears. Non-tender.  Left TM-normal. Right TM-normal.  OroPharynx Exam: Dental hygiene adequate. Normal buccal mucosa. Normal pharynx.  Neck Exam: Supple, no masses or enlarged, tender nodes.  Thyroid Exam: No nodules or enlargement or tenderness.  Chest/Respiratory Exam: Normal, comfortable, easy respirations. Chest wall normal. Lungs are clear to auscultation. No wheezing, crackles, or rhonchi.  Cardiovascular Exam: Regular rate and rhythm. No murmur, gallop, or rubs. No pedal edema.  Gastrointestinal Exam: Soft, non-tender, no masses or organomegaly.  Musculoskeletal Exam: Back is non-tender, full ROM of upper and lower extremities.  Skin: no rash, warm and dry.    Neurologic Exam: Nonfocal, no tremor. Normal gait.  Psychiatric Exam: Alert - appropriate, normal affect      ASSESSMENT / PLAN:       ICD-10-CM    1. Encounter for Medicare annual wellness exam Z00.00    2. Need for vaccination Z23 HC FLU VACCINE, INCREASED ANTIGEN, PRESV FREE " [42661]   3. CKD (chronic kidney disease) stage 3, GFR 30-59 ml/min (H) N18.3    4. Hyperlipidemia LDL goal <130 E78.5    5. Essential hypertension with goal blood pressure less than 140/90 I10    6. Gastroesophageal reflux disease, esophagitis presence not specified K21.9    7. Benign non-nodular prostatic hyperplasia with lower urinary tract symptoms N40.1        End of Life Planning:  Patient currently has an advanced directive: Yes.  Practitioner is supportive of decision.    COUNSELING:  Reviewed preventive health counseling, as reflected in patient instructions       Regular exercise       Healthy diet/nutrition       Immunizations    Vaccinated for: Influenza             Colon cancer screening       Prostate cancer screening  Discussed the risks and benefits of prostate cancer screening via PSA,   For men aged 55 to 69 years, the decision to undergo periodic prostate-specific antigen (PSA)-based screening for prostate cancer should be an individual one. Before deciding whether to be screened, men should have an opportunity to discuss the potential benefits and harms of screening with their clinician and to incorporate their values and preferences in the decision. Screening offers a small potential benefit of reducing the chance of death from prostate cancer in some men. However, many men will experience potential harms of screening, including false-positive results that require additional testing and possible prostate biopsy; overdiagnosis and overtreatment; and treatment complications, such as incontinence and erectile dysfunction. In determining whether this service is appropriate in individual cases, patients and clinicians should consider the balance of benefits and harms on the basis of family history, race/ethnicity, comorbid medical conditions, patient values about the benefits and harms of screening and treatment-specific outcomes, and other health needs. Clinicians should not screen men who do not  "express a preference for screening.    The USPSTF recommends against PSA-based screening for prostate cancer in men 70 years and older.    Patient declined screening today      Estimated body mass index is 32.43 kg/m  as calculated from the following:    Height as of 6/7/19: 1.778 m (5' 10\").    Weight as of 6/7/19: 102.5 kg (226 lb).         reports that he has quit smoking. His smoking use included cigarettes. He has never used smokeless tobacco.      Appropriate preventive services were discussed with this patient, including applicable screening as appropriate for cardiovascular disease, diabetes, osteopenia/osteoporosis, and glaucoma.  As appropriate for age/gender, discussed screening for colorectal cancer, prostate cancer, breast cancer, and cervical cancer. Checklist reviewing preventive services available has been given to the patient.    Reviewed patients plan of care and provided an AVS. The Intermediate Care Plan ( asthma action plan, low back pain action plan, and migraine action plan) for Jerry meets the Care Plan requirement. This Care Plan has been established and reviewed with the Patient.    Counseling Resources:  ATP IV Guidelines  Pooled Cohorts Equation Calculator  Breast Cancer Risk Calculator  FRAX Risk Assessment  ICSI Preventive Guidelines  Dietary Guidelines for Americans, 2010  [employer]'s MyPlate  ASA Prophylaxis  Lung CA Screening    Amaury Alejandro Lyon MD  [facility]    Identified Health Risks:  "

## 2019-09-19 NOTE — PATIENT INSTRUCTIONS
Patient Education   Personalized Prevention Plan  You are due for the preventive services outlined below.  Your care team is available to assist you in scheduling these services.  If you have already completed any of these items, please share that information with your care team to update in your medical record.  Health Maintenance Due   Topic Date Due     Zoster (Shingles) Vaccine (1 of 2) 07/08/1997     Discuss Advance Care Planning  12/14/2017     FALL RISK ASSESSMENT  08/21/2018     Thyroid Function Lab  01/30/2019     Basic Metabolic Panel  03/18/2019     Flu Vaccine (1) 09/01/2019     Cholesterol Lab  09/18/2019     Annual Wellness Visit  09/18/2019

## 2019-09-19 NOTE — NURSING NOTE
Chief Complaint   Patient presents with     Physical     initial /80 (BP Location: Right arm, Cuff Size: Adult Large)   Pulse 92   Temp 97.6  F (36.4  C) (Oral)   Resp 16   Ht 1.829 m (6')   Wt 103.9 kg (229 lb)   SpO2 96%   BMI 31.06 kg/m   Estimated body mass index is 31.06 kg/m  as calculated from the following:    Height as of this encounter: 1.829 m (6').    Weight as of this encounter: 103.9 kg (229 lb).  BP completed using cuff size: large.   R arm      Health Maintenance that is potentially due pending provider review:  NONE    n/a    Neri Dumont ma

## 2019-09-20 ASSESSMENT — ANXIETY QUESTIONNAIRES: GAD7 TOTAL SCORE: 2

## 2019-11-04 ENCOUNTER — MYC REFILL (OUTPATIENT)
Dept: FAMILY MEDICINE | Facility: CLINIC | Age: 74
End: 2019-11-04

## 2019-11-04 DIAGNOSIS — F33.8 SEASONAL DEPRESSION (H): ICD-10-CM

## 2019-11-04 RX ORDER — DULOXETIN HYDROCHLORIDE 30 MG/1
30 CAPSULE, DELAYED RELEASE ORAL DAILY
Qty: 30 CAPSULE | Refills: 3 | Status: CANCELLED | OUTPATIENT
Start: 2019-11-04

## 2019-11-04 RX ORDER — DULOXETIN HYDROCHLORIDE 30 MG/1
30 CAPSULE, DELAYED RELEASE ORAL DAILY
Qty: 90 CAPSULE | Refills: 0 | Status: SHIPPED | OUTPATIENT
Start: 2019-11-04 | End: 2020-01-28

## 2019-11-04 NOTE — TELEPHONE ENCOUNTER
"Pt requesting 90 day instead  Prescription approved per [facility] Refill Protocol.  Aria YEAGER RN    Last Written Prescription Date:  9/19/2019  Last Fill Quantity: 30,  # refills: 3   Last office visit: 9/19/2019 with prescribing provider:     Future Office Visit:    Requested Prescriptions   Pending Prescriptions Disp Refills     DULoxetine (CYMBALTA) 30 MG capsule 30 capsule 3     Sig: Take 1 capsule (30 mg) by mouth daily       Serotonin-Norepinephrine Reuptake Inhibitors  Passed - 11/4/2019  9:47 AM        Passed - Blood pressure under 140/90 in past 12 months     BP Readings from Last 3 Encounters:   09/19/19 125/80   12/28/18 126/80   12/03/18 106/74                 Passed - PHQ-9 score of less than 5 in past 6 months     Please review last PHQ-9 score.           Passed - Medication is active on med list        Passed - Patient is age 18 or older        Passed - Recent (6 mo) or future (30 days) visit within the authorizing provider's specialty     Patient had office visit in the last 6 months or has a visit in the next 30 days with authorizing provider or within the authorizing provider's specialty.  See \"Patient Info\" tab in inbasket, or \"Choose Columns\" in Meds & Orders section of the refill encounter.            "

## 2019-11-21 ENCOUNTER — OFFICE VISIT (OUTPATIENT)
Dept: FAMILY MEDICINE | Facility: CLINIC | Age: 74
End: 2019-11-21
Payer: MEDICARE

## 2019-11-21 VITALS
BODY MASS INDEX: 30.75 KG/M2 | DIASTOLIC BLOOD PRESSURE: 80 MMHG | OXYGEN SATURATION: 96 % | RESPIRATION RATE: 14 BRPM | SYSTOLIC BLOOD PRESSURE: 127 MMHG | WEIGHT: 227 LBS | HEIGHT: 72 IN | HEART RATE: 87 BPM

## 2019-11-21 DIAGNOSIS — R39.15 URINARY URGENCY: Primary | ICD-10-CM

## 2019-11-21 LAB
ALBUMIN UR-MCNC: NEGATIVE MG/DL
APPEARANCE UR: CLEAR
BILIRUB UR QL STRIP: NEGATIVE
COLOR UR AUTO: YELLOW
GLUCOSE UR STRIP-MCNC: NEGATIVE MG/DL
HGB UR QL STRIP: NEGATIVE
KETONES UR STRIP-MCNC: NEGATIVE MG/DL
LEUKOCYTE ESTERASE UR QL STRIP: NEGATIVE
NITRATE UR QL: NEGATIVE
PH UR STRIP: 6 PH (ref 5–7)
RBC #/AREA URNS AUTO: NORMAL /HPF
SOURCE: NORMAL
SP GR UR STRIP: 1.01 (ref 1–1.03)
UROBILINOGEN UR STRIP-ACNC: 1 EU/DL (ref 0.2–1)
WBC #/AREA URNS AUTO: NORMAL /HPF

## 2019-11-21 PROCEDURE — 81001 URINALYSIS AUTO W/SCOPE: CPT | Performed by: FAMILY MEDICINE

## 2019-11-21 PROCEDURE — 99213 OFFICE O/P EST LOW 20 MIN: CPT | Performed by: FAMILY MEDICINE

## 2019-11-21 PROCEDURE — 84153 ASSAY OF PSA TOTAL: CPT | Performed by: FAMILY MEDICINE

## 2019-11-21 PROCEDURE — 36415 COLL VENOUS BLD VENIPUNCTURE: CPT | Performed by: FAMILY MEDICINE

## 2019-11-21 PROCEDURE — 80069 RENAL FUNCTION PANEL: CPT | Performed by: FAMILY MEDICINE

## 2019-11-21 RX ORDER — CIPROFLOXACIN 500 MG/1
500 TABLET, FILM COATED ORAL 2 TIMES DAILY
Qty: 28 TABLET | Refills: 0 | Status: SHIPPED | OUTPATIENT
Start: 2019-11-21 | End: 2020-02-11

## 2019-11-21 ASSESSMENT — MIFFLIN-ST. JEOR: SCORE: 1817.67

## 2019-11-21 ASSESSMENT — PAIN SCALES - GENERAL: PAINLEVEL: NO PAIN (0)

## 2019-11-21 NOTE — PROGRESS NOTES
Subjective     Jerry Miguel is a 74 year old male who presents to clinic today for the following health issues:    HPI   Genitourinary symptoms      Duration: last couple weeks    Description:  Frequency more during the day, less at night     Intensity:  moderate    Accompanying signs and symptoms (fever/discharge/nausea/vomiting/back or abdominal pain):  Feeling lots of pressure down there     History (frequent UTI's/kidney stones/prostate problems): previous swollen prostate. Had an elevated PSA about 10yrs ago       Precipitating or alleviating factors: None    Therapies tried and outcome: finastride      PSA   Date Value Ref Range Status   08/11/2015 1.01 0 - 4 ug/L Final     Was just slow before, but now it is more urgency    Trend of symptoms: worsening  History of: prostatitis in the past  Ill contacts:  no recent STD exposure    Last antibiotic reported as: none    Current Outpatient Medications   Medication     DULoxetine (CYMBALTA) 30 MG capsule     esomeprazole (NEXIUM) 40 MG DR capsule     finasteride (PROSCAR) 5 MG tablet     ketoconazole (NIZORAL) 2 % cream     rosuvastatin (CRESTOR) 10 MG tablet     valsartan-hydrochlorothiazide (DIOVAN HCT) 320-25 MG tablet     VITAMIN D, CHOLECALCIFEROL, PO     fluticasone (VERAMYST) 27.5 MCG/SPRAY spray     No current facility-administered medications for this visit.      I have reviewed the patient's medical history in detail; there are no changes to the history as noted in EpicCare.    ROS: As per HPI.  Constitutional: no fevers/sweats/chills, no weight changes  GI: no nausea/vomiting/diarrhea, normal stooling  GYN: no penile discharge    EXAM  /80 (BP Location: Right arm, Patient Position: Sitting, Cuff Size: Adult Large)   Pulse 87   Resp 14   Ht 1.829 m (6')   Wt 103 kg (227 lb)   SpO2 96%   BMI 30.79 kg/m    Gen: Healthy appearing male in no apparent distress  Abdomen: Soft, non-tender, non-distended with bowel sounds in all 4 quadrants. No  rebound or guarding. No palpable organomegaly.  GYN: No CVA tenderness.  + prostate tenderness  Normal scrotal exam    No results found for this or any previous visit (from the past 24 hour(s)).    ASSESSMENT/PLAN:  1. Urinary urgency    Symptoms most consistent with recurrent prostatitis  However we will do some labs to check at this  Plus urinalysis kidney function  - UA with Microscopic reflex to Culture  - Renal panel (Alb, BUN, Ca, Cl, CO2, Creat, Gluc, Phos, K, Na)  - ciprofloxacin (CIPRO) 500 MG tablet; Take 1 tablet (500 mg) by mouth 2 times daily for 14 days  Dispense: 28 tablet; Refill: 0  - PSA, tumor marker      Amaury Lyon MD, MD MPH

## 2019-11-22 LAB
ALBUMIN SERPL-MCNC: 4.1 G/DL (ref 3.4–5)
ANION GAP SERPL CALCULATED.3IONS-SCNC: 8 MMOL/L (ref 3–14)
BUN SERPL-MCNC: 21 MG/DL (ref 7–30)
CALCIUM SERPL-MCNC: 8.9 MG/DL (ref 8.5–10.1)
CHLORIDE SERPL-SCNC: 100 MMOL/L (ref 94–109)
CO2 SERPL-SCNC: 29 MMOL/L (ref 20–32)
CREAT SERPL-MCNC: 1.31 MG/DL (ref 0.66–1.25)
GFR SERPL CREATININE-BSD FRML MDRD: 54 ML/MIN/{1.73_M2}
GLUCOSE SERPL-MCNC: 87 MG/DL (ref 70–99)
PHOSPHATE SERPL-MCNC: 3.8 MG/DL (ref 2.5–4.5)
POTASSIUM SERPL-SCNC: 3.5 MMOL/L (ref 3.4–5.3)
PSA SERPL-MCNC: 1.23 UG/L (ref 0–4)
SODIUM SERPL-SCNC: 137 MMOL/L (ref 133–144)

## 2019-12-13 ENCOUNTER — OFFICE VISIT (OUTPATIENT)
Dept: OPHTHALMOLOGY | Facility: CLINIC | Age: 74
End: 2019-12-13
Attending: OPHTHALMOLOGY
Payer: MEDICARE

## 2019-12-13 DIAGNOSIS — H26.9 CATARACT: ICD-10-CM

## 2019-12-13 DIAGNOSIS — H25.13 NUCLEAR SENILE CATARACT OF BOTH EYES: Primary | ICD-10-CM

## 2019-12-13 PROCEDURE — 92015 DETERMINE REFRACTIVE STATE: CPT | Mod: GY,ZF

## 2019-12-13 PROCEDURE — G0463 HOSPITAL OUTPT CLINIC VISIT: HCPCS | Mod: ZF

## 2019-12-13 ASSESSMENT — VISUAL ACUITY
METHOD: SNELLEN - LINEAR
OS_CC: 20/50
OS_CC+: -1
OD_BAT_LOW: 20/20
OS_PH_CC: 20/40
OS_BAT_LOW: 20/25
OD_CC+: -1
OD_CC: 20/40
OS_BAT_MED: 20/30
OD_BAT_HIGH: 20/25
OD_BAT_MED: 20/25
OD_PH_CC: 20/30
CORRECTION_TYPE: GLASSES
OS_PH_CC+: +3
OD_PH_CC+: -
OS_BAT_HIGH: 20/40

## 2019-12-13 ASSESSMENT — REFRACTION_WEARINGRX
OS_AXIS: 091
OS_SPHERE: -0.50
OS_CYLINDER: +1.00
SPECS_TYPE: PAL
OD_SPHERE: -0.50
OD_CYLINDER: +0.75
OS_ADD: +2.50
OD_ADD: +2.50
OD_AXIS: 090

## 2019-12-13 ASSESSMENT — REFRACTION_MANIFEST
OS_CYLINDER: +0.75
OD_SPHERE: -1.25
OS_ADD: +2.50
OD_ADD: +2.50
OD_CYLINDER: SPHERE
OS_SPHERE: -1.00
OS_AXIS: 092

## 2019-12-13 ASSESSMENT — CUP TO DISC RATIO
OD_RATIO: 0.4
OS_RATIO: 0.3

## 2019-12-13 ASSESSMENT — CONF VISUAL FIELD
METHOD: COUNTING FINGERS
OS_NORMAL: 1
OD_NORMAL: 1

## 2019-12-13 ASSESSMENT — EXTERNAL EXAM - LEFT EYE: OS_EXAM: NORMAL

## 2019-12-13 ASSESSMENT — PACHYMETRY
OD_CT(UM): 585
OS_CT(UM): 567

## 2019-12-13 ASSESSMENT — TONOMETRY
OS_IOP_MMHG: 21
IOP_METHOD: TONOPEN
IOP_METHOD: APPLANATION
OD_IOP_MMHG: 15
OD_IOP_MMHG: 20
OS_IOP_MMHG: 15

## 2019-12-13 ASSESSMENT — EXTERNAL EXAM - RIGHT EYE: OD_EXAM: NORMAL

## 2019-12-13 ASSESSMENT — SLIT LAMP EXAM - LIDS
COMMENTS: NORMAL
COMMENTS: NORMAL

## 2019-12-13 NOTE — NURSING NOTE
Chief Complaints and History of Present Illnesses   Patient presents with     Follow Up     Senile Nuclear sclerosis, both eyes     Chief Complaint(s) and History of Present Illness(es)     Follow Up     Laterality: both eyes    Associated symptoms: glare.  Negative for eye pain, redness and tearing    Pain scale: 0/10    Comments: Senile Nuclear sclerosis, both eyes              Comments     He states that his vision has seemed stable in both eyes, since his last eye exam.   He wonders if his cataracts are getting worse.  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  He also experiences glare at night, which makes night driving more difficult.    Emelia Kearns, COT 9:16 AM  December 13, 2019

## 2019-12-13 NOTE — PROGRESS NOTES
1)NS OU -- +glare effect but meeting needs    A long discussion of the risks, benefits, and alternatives including potential treatment and management options were had with patient and a decision was made to observe at this time.  Patient will return to clinic in 1 year with repeat dilated eye exam and  glare test.    Resident Note (for educational purposes, see above for Assessment and Plan):  Here today for cat eval, last seen 2015  He notices glare at night when driving, but this has been stable since the last visit   BCVA 20/20 each eye, BAT minimal right eye, moderate left eye   Given stability in symptoms, great BCVA would recommend updated glasses Rx then re-evaluate in 8-12 months    Ingrid Canseco MD  Ophthalmology Resident, PGY-3    Attending Physician Attestation:  Complete documentation of historical and exam elements from today's encounter can be found in the full encounter summary report (not reduplicated in this progress note). I personally obtained the chief complaint(s) and history of present illness.  I confirmed and edited as necessary the review of systems, past medical/surgical history, family history, social history, and examination findings as documented by others; and I examined the patient myself. I personally reviewed the relevant tests, images, and reports as documented above. I formulated and edited as necessary the assessment and plan and discussed the findings and management plan with the patient and family.  - Francoise Morales MD

## 2019-12-13 NOTE — PATIENT INSTRUCTIONS
A long discussion of the risks, benefits, and alternatives including potential treatment and management options were had with patient and a decision was made to observe at this time.  Patient will return to clinic in 1 year with repeat dilated eye exam and  glare test.

## 2020-01-25 DIAGNOSIS — F33.8 SEASONAL DEPRESSION (H): ICD-10-CM

## 2020-01-27 RX ORDER — DULOXETIN HYDROCHLORIDE 30 MG/1
CAPSULE, DELAYED RELEASE ORAL
Start: 2020-01-27

## 2020-01-27 NOTE — TELEPHONE ENCOUNTER
"Too soon.    Last Written Prescription Date:  11/4/2019  Last Fill Quantity: 90,  # refills: 0   Last office visit: 11/21/2019 with prescribing provider:  yes   Future Office Visit:      Mary Arias RN    Requested Prescriptions   Refused Prescriptions Disp Refills     DULoxetine (CYMBALTA) 30 MG capsule [Pharmacy Med Name: DULOXETINE HCL DR CAPS 30MG]       Sig: TAKE 1 CAPSULE DAILY       Serotonin-Norepinephrine Reuptake Inhibitors  Passed - 1/25/2020 12:05 AM        Passed - Blood pressure under 140/90 in past 12 months     BP Readings from Last 3 Encounters:   11/21/19 127/80   09/19/19 125/80   12/28/18 126/80                 Passed - PHQ-9 score of less than 5 in past 6 months     Please review last PHQ-9 score.           Passed - Medication is active on med list        Passed - Patient is age 18 or older        Passed - Recent (6 mo) or future (30 days) visit within the authorizing provider's specialty     Patient had office visit in the last 6 months or has a visit in the next 30 days with authorizing provider or within the authorizing provider's specialty.  See \"Patient Info\" tab in inbasket, or \"Choose Columns\" in Meds & Orders section of the refill encounter.            "

## 2020-01-28 ENCOUNTER — MYC REFILL (OUTPATIENT)
Dept: FAMILY MEDICINE | Facility: CLINIC | Age: 75
End: 2020-01-28

## 2020-01-28 DIAGNOSIS — F33.8 SEASONAL DEPRESSION (H): ICD-10-CM

## 2020-01-29 RX ORDER — DULOXETIN HYDROCHLORIDE 30 MG/1
30 CAPSULE, DELAYED RELEASE ORAL DAILY
Qty: 90 CAPSULE | Refills: 0 | Status: SHIPPED | OUTPATIENT
Start: 2020-01-29 | End: 2020-04-28

## 2020-01-29 NOTE — TELEPHONE ENCOUNTER
"Prescription approved per [facility] [place].  Aria YEAGER RN    Last Written Prescription Date:  11/4/2019  Last Fill Quantity: 90,  # refills: 0   Last office visit: 11/21/2019 with prescribing provider:     Future Office Visit:    Requested Prescriptions   Pending Prescriptions Disp Refills     DULoxetine (CYMBALTA) 30 MG capsule 90 capsule 0     Sig: Take 1 capsule (30 mg) by mouth daily       Serotonin-Norepinephrine Reuptake Inhibitors  Passed - 1/28/2020  7:08 PM        Passed - Blood pressure under 140/90 in past 12 months     BP Readings from Last 3 Encounters:   11/21/19 127/80   09/19/19 125/80   12/28/18 126/80                 Passed - PHQ-9 score of less than 5 in past 6 months     Please review last PHQ-9 score.           Passed - Medication is active on med list        Passed - Patient is age 18 or older        Passed - Recent (6 mo) or future (30 days) visit within the authorizing provider's specialty     Patient had office visit in the last 6 months or has a visit in the next 30 days with authorizing provider or within the authorizing provider's specialty.  See \"Patient Info\" tab in inbasket, or \"Choose Columns\" in Meds & Orders section of the refill encounter.            "

## 2020-02-11 ENCOUNTER — OFFICE VISIT (OUTPATIENT)
Dept: FAMILY MEDICINE | Facility: CLINIC | Age: 75
End: 2020-02-11
Payer: MEDICARE

## 2020-02-11 VITALS
WEIGHT: 230 LBS | BODY MASS INDEX: 31.15 KG/M2 | SYSTOLIC BLOOD PRESSURE: 138 MMHG | HEIGHT: 72 IN | DIASTOLIC BLOOD PRESSURE: 88 MMHG | OXYGEN SATURATION: 97 % | TEMPERATURE: 97.6 F | HEART RATE: 95 BPM

## 2020-02-11 DIAGNOSIS — B82.0 INTESTINAL WORMS: Primary | ICD-10-CM

## 2020-02-11 DIAGNOSIS — I10 ESSENTIAL HYPERTENSION WITH GOAL BLOOD PRESSURE LESS THAN 140/90: ICD-10-CM

## 2020-02-11 DIAGNOSIS — L71.9 ROSACEA: ICD-10-CM

## 2020-02-11 LAB
PARASITE SPEC INSPECT: NORMAL
SPECIMEN SOURCE: NORMAL

## 2020-02-11 PROCEDURE — 87169 MACROSCOPIC EXAM PARASITE: CPT | Performed by: FAMILY MEDICINE

## 2020-02-11 PROCEDURE — 99214 OFFICE O/P EST MOD 30 MIN: CPT | Performed by: FAMILY MEDICINE

## 2020-02-11 RX ORDER — METRONIDAZOLE 10 MG/G
GEL TOPICAL DAILY
Status: CANCELLED | OUTPATIENT
Start: 2020-02-11

## 2020-02-11 ASSESSMENT — MIFFLIN-ST. JEOR: SCORE: 1831.27

## 2020-02-11 NOTE — PROGRESS NOTES
Subjective     Jerry Miguel is a 74 year old male who presents to clinic today for the following health issues:    HPI   Parasite- found a worm in his stools today and brought it here , he has had some GI symptoms on and off for a while now , has had EGD and colonoscopy, H pylori test and treatment . 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 He does have diarrhea and constipation, diagnosed with IBS and GERD, Barretts esophagus .   2) hx of rosacea , in the past he was Rx metronidazole cream for it , then he tried ketoconazole and that did not work , triamcinolone on and off has helped but he does not use daily . Has redness in cheeks , some on the nose       Duration: 1 day    Description:       Consistency of stool: explosive       Blood in stool: no        Number of loose stools past 24 hours: NA    Intensity:  mild    Accompanying signs and symptoms:       Fever: no        Nausea/vomitting: no        Abdominal pain: YES       Weight loss: no     History (recent antibiotics or travel/ill contacts/med changes/testing done): none    Precipitating or alleviating factors: None    Therapies tried and outcome: none        Patient Active Problem List   Diagnosis     Osteoarthritis of knee     CARDIOVASCULAR SCREENING; LDL GOAL LESS THAN 130     Seasonal allergic rhinitis     Hyperlipidemia LDL goal <130     Benign non-nodular prostatic hyperplasia with lower urinary tract symptoms     GERD (gastroesophageal reflux disease)     Hyperthyroidism     Essential hypertension with goal blood pressure less than 140/90     Advanced care planning/counseling discussion     Right knee DJD     CKD (chronic kidney disease) stage 3, GFR 30-59 ml/min (H)     Seasonal depression (H)     Nuclear senile cataract of both eyes     Past Surgical History:   Procedure Laterality Date     APPENDECTOMY       ARTHROPLASTY KNEE Right 1/4/2016    Procedure: ARTHROPLASTY KNEE;   Surgeon: Manuel Ponce MD;  Location: [facility] OR     COLONOSCOPY       COLONOSCOPY WITH CO2 INSUFFLATION N/A 3/2/2018    Procedure: COLONOSCOPY WITH CO2 INSUFFLATION;  Colonoscopy, Constipation, unspecified constipation type Recent change in frequency of bowel movements, [place], BMI 31.52, [facility] [place];  Surgeon: Edith Craig MD;  Location: [facility] OR     ENT SURGERY      tonsillectomy     LASER HOLMIUM LITHOTRIPSY URETER(S), INSERT STENT, COMBINED  10/11/2012    Procedure: COMBINED CYSTOSCOPY, URETEROSCOPY, LASER HOLMIUM LITHOTRIPSY URETER(S), INSERT STENT;  CYSTOSCOPY, ATTEMPTED RIGHT URETEROSCOPY, INSERT RIGHT URETERAL STENT, Laser [place];  Surgeon: Petar Ulrich MD;  Location: [facility] OR       Social History     Tobacco Use     Smoking status: Former Smoker     Types: Cigarettes     Smokeless tobacco: Never Used   Substance Use Topics     Alcohol use: Yes     Alcohol/week: 0.0 standard drinks     Comment: 3x week     Family History   Problem Relation Age of Onset     Cancer Mother         ovarian     Cancer Father         lung     Heart Disease Father         possibly     Cancer Paternal Grandfather         esophageal cancer     Cancer Brother 68        prostate cancer     Diabetes Maternal Grandmother      Breast Cancer No family hx of      Cancer - colorectal No family hx of      Coronary Artery Disease No family hx of      Hypertension No family hx of      Hyperlipidemia No family hx of      Cerebrovascular Disease No family hx of      Colon Cancer No family hx of      Prostate Cancer No family hx of      Other Cancer No family hx of      Depression No family hx of      Anxiety Disorder No family hx of      Mental Illness No family hx of      Substance Abuse No family hx of      Anesthesia Reaction No family hx of      Asthma No family hx of      Osteoporosis No family hx of      Genetic Disorder No family hx of      Thyroid Disease No family hx of      Obesity No family hx of       Unknown/Adopted No family hx of      Macular Degeneration No family hx of      Glaucoma No family hx of          Current Outpatient Medications   Medication Sig Dispense Refill     DULoxetine (CYMBALTA) 30 MG capsule Take 1 capsule (30 mg) by mouth daily 90 capsule 0     esomeprazole (NEXIUM) 40 MG DR capsule TAKE 1 CAPSULE DAILY 30 TO 60 MINUTES BEFORE EATING 90 capsule 3     finasteride (PROSCAR) 5 MG tablet Take 1 tablet (5 mg) by mouth daily 90 tablet 3     fluticasone (VERAMYST) 27.5 MCG/SPRAY spray Spray 1 spray into both nostrils daily       ketoconazole (NIZORAL) 2 % cream Apply topically daily Apply topically daily for 1 month 60 g 3     metroNIDAZOLE (METROCREAM) 0.75 % external cream Apply topically 2 times daily 45 g 1     rosuvastatin (CRESTOR) 10 MG tablet Take 1 tablet (10 mg) by mouth daily 90 tablet 3     valsartan-hydrochlorothiazide (DIOVAN HCT) 320-25 MG tablet Take 1 tablet by mouth daily 90 tablet 3     VITAMIN D, CHOLECALCIFEROL, PO Take 400 Units by mouth daily       Allergies   Allergen Reactions     Codeine Sulfate Nausea and Vomiting     Recent Labs   Lab Test 11/21/19  1522 09/19/19  1100 09/18/18  1026 01/30/18  1351 08/21/17  1019  11/08/16  1038   LDL  --  103* 106*  --  103*  --  111*   HDL  --  47 51  --  53  --  51   TRIG  --  225* 203*  --  136  --  143   ALT  --   --  36 36  --   --  23   CR 1.31* 1.11 1.22 1.32* 1.23   < > 1.42*   GFRESTIMATED 54* 66 59* 54* 58*   < > 49*   GFRESTBLACK 62 76 71 65 70   < > 60*   POTASSIUM 3.5 3.6 3.6 3.3* 3.3*   < > 3.9   TSH  --   --   --  2.10 1.17  --  1.58    < > = values in this interval not displayed.      BP Readings from Last 3 Encounters:   02/11/20 138/88   11/21/19 127/80   09/19/19 125/80    Wt Readings from Last 3 Encounters:   02/11/20 104.3 kg (230 lb)   11/21/19 103 kg (227 lb)   09/19/19 103.9 kg (229 lb)                    2  Reviewed and updated as needed this visit by Provider         Review of Systems   ROS COMP:  Constitutional, HEENT, cardiovascular, pulmonary, GI, , musculoskeletal, neuro, skin, endocrine and psych systems are negative, except as otherwise noted.      Objective    BP (!) 145/90 (BP Location: Left arm, Patient Position: Chair, Cuff Size: Adult Regular)   Pulse 95   Temp 97.6  F (36.4  C) (Oral)   Ht 1.829 m (6')   Wt 104.3 kg (230 lb)   SpO2 97%   BMI 31.19 kg/m    Body mass index is 31.19 kg/m .  Physical Exam   GENERAL: healthy, alert and no distress  NECK: no adenopathy, no asymmetry, masses, or scars and thyroid normal to palpation  RESP: lungs clear to auscultation - no rales, rhonchi or wheezes  CV: regular rate and rhythm, normal S1 S2, no S3 or S4, no murmur, click or rub, no peripheral edema and peripheral pulses strong  ABDOMEN: soft, nontender, no hepatosplenomegaly, no masses and bowel sounds normal  MS: no gross musculoskeletal defects noted, no edema    Diagnostic Test Results:  Labs reviewed in Epic  Results for orders placed or performed in visit on 02/11/20 (from the past 24 hour(s))   Macroscopic parasite exam   Result Value Ref Range    Specimen Description Feces     Macro Parasites Exam Object submitted is not a parasite            Assessment & Plan     1. Intestinal worms  He brought here tissue with what looked liek a long worm like material and I have placed it in a specimen container to be examined as pt thinks it is worm.  - Macroscopic parasite exam    2. Essential hypertension with goal blood pressure less than 140/90  He is on anti HTN meds and his BP is well controlled at home but he is a bit stressed out today - would need to f/u on this in a couple of weeks , unless he does check BP at home and that is well controlled .    3. Rosacea  Discussed going back on the metronidazole cream  To use . If not better will let me know and will Rx TMC cream to use sparingly for up to two weeks.  - metroNIDAZOLE (METROCREAM) 0.75 % external cream; Apply topically 2 times daily   Dispense: 45 g; Refill: 1       RTC if no improving or worsening.  Pt is aware  and comfortable with the current plan.      Mounika Dhillon MD  [facility]

## 2020-03-02 ENCOUNTER — MYC MEDICAL ADVICE (OUTPATIENT)
Dept: FAMILY MEDICINE | Facility: CLINIC | Age: 75
End: 2020-03-02

## 2020-04-27 DIAGNOSIS — F33.8 SEASONAL DEPRESSION (H): ICD-10-CM

## 2020-04-28 NOTE — TELEPHONE ENCOUNTER
Last Written Prescription Date:  1/29/2020  Last Fill Quantity: 90,  # refills: 0   Last office visit: 2/11/2020 with prescribing provider:  DINA Erazo  Last saw PCP 11/21/19   Future Office Visit:      Do for PHQ9. Last done 9/2019  Sent to patient via [employer].  Mary Arias RN

## 2020-04-29 RX ORDER — DULOXETIN HYDROCHLORIDE 30 MG/1
CAPSULE, DELAYED RELEASE ORAL
Qty: 90 CAPSULE | Refills: 0 | Status: SHIPPED | OUTPATIENT
Start: 2020-04-29 | End: 2020-07-27

## 2020-04-29 NOTE — TELEPHONE ENCOUNTER
PHQ9 = 4.  Prescription approved per [facility] Refill Protocol.  Mary Arias RN

## 2020-05-02 ENCOUNTER — MYC REFILL (OUTPATIENT)
Dept: FAMILY MEDICINE | Facility: CLINIC | Age: 75
End: 2020-05-02

## 2020-05-02 DIAGNOSIS — F33.8 SEASONAL DEPRESSION (H): ICD-10-CM

## 2020-05-02 RX ORDER — DULOXETIN HYDROCHLORIDE 30 MG/1
30 CAPSULE, DELAYED RELEASE ORAL DAILY
Qty: 90 CAPSULE | Refills: 0 | Status: CANCELLED | OUTPATIENT
Start: 2020-05-02

## 2020-07-26 ENCOUNTER — MYC REFILL (OUTPATIENT)
Dept: FAMILY MEDICINE | Facility: CLINIC | Age: 75
End: 2020-07-26

## 2020-07-26 DIAGNOSIS — F33.8 SEASONAL DEPRESSION (H): ICD-10-CM

## 2020-07-26 RX ORDER — DULOXETIN HYDROCHLORIDE 30 MG/1
30 CAPSULE, DELAYED RELEASE ORAL DAILY
Qty: 90 CAPSULE | Refills: 0 | Status: CANCELLED | OUTPATIENT
Start: 2020-07-26

## 2020-09-13 DIAGNOSIS — K21.9 GASTROESOPHAGEAL REFLUX DISEASE, ESOPHAGITIS PRESENCE NOT SPECIFIED: ICD-10-CM

## 2020-09-13 DIAGNOSIS — N40.1 BENIGN NON-NODULAR PROSTATIC HYPERPLASIA WITH LOWER URINARY TRACT SYMPTOMS: ICD-10-CM

## 2020-09-13 DIAGNOSIS — I10 ESSENTIAL HYPERTENSION WITH GOAL BLOOD PRESSURE LESS THAN 140/90: ICD-10-CM

## 2020-09-13 DIAGNOSIS — E78.5 HYPERLIPIDEMIA LDL GOAL <130: ICD-10-CM

## 2020-09-14 RX ORDER — ESOMEPRAZOLE MAGNESIUM 40 MG/1
CAPSULE, DELAYED RELEASE ORAL
Qty: 30 CAPSULE | Refills: 0 | Status: SHIPPED | OUTPATIENT
Start: 2020-09-14 | End: 2020-09-28

## 2020-09-14 RX ORDER — ROSUVASTATIN CALCIUM 10 MG/1
TABLET, COATED ORAL
Qty: 30 TABLET | Refills: 0 | Status: SHIPPED | OUTPATIENT
Start: 2020-09-14 | End: 2020-09-28

## 2020-09-14 RX ORDER — FINASTERIDE 5 MG/1
TABLET, FILM COATED ORAL
Qty: 30 TABLET | Refills: 0 | Status: SHIPPED | OUTPATIENT
Start: 2020-09-14 | End: 2020-09-28

## 2020-09-14 RX ORDER — VALSARTAN AND HYDROCHLOROTHIAZIDE 320; 25 MG/1; MG/1
TABLET, FILM COATED ORAL
Qty: 30 TABLET | Refills: 0 | Status: SHIPPED | OUTPATIENT
Start: 2020-09-14 | End: 2020-09-28

## 2020-09-14 NOTE — TELEPHONE ENCOUNTER
Crestor, Finasteride, Nexium, Diovan:    Medication is being filled for 1 time refill only due to:  Patient needs to be seen because due for physical.     Last 9/19/2019    rimidi message sent to patient asking him to schedule physical.  Mary Arias RN

## 2020-09-28 ENCOUNTER — OFFICE VISIT (OUTPATIENT)
Dept: FAMILY MEDICINE | Facility: CLINIC | Age: 75
End: 2020-09-28
Payer: MEDICARE

## 2020-09-28 VITALS
WEIGHT: 231 LBS | SYSTOLIC BLOOD PRESSURE: 131 MMHG | TEMPERATURE: 97.8 F | HEART RATE: 80 BPM | RESPIRATION RATE: 16 BRPM | BODY MASS INDEX: 31.29 KG/M2 | HEIGHT: 72 IN | DIASTOLIC BLOOD PRESSURE: 86 MMHG | OXYGEN SATURATION: 95 %

## 2020-09-28 DIAGNOSIS — K21.9 GASTROESOPHAGEAL REFLUX DISEASE, ESOPHAGITIS PRESENCE NOT SPECIFIED: ICD-10-CM

## 2020-09-28 DIAGNOSIS — E78.5 HYPERLIPIDEMIA LDL GOAL <130: ICD-10-CM

## 2020-09-28 DIAGNOSIS — Z23 NEED FOR VACCINATION: ICD-10-CM

## 2020-09-28 DIAGNOSIS — N40.1 BENIGN NON-NODULAR PROSTATIC HYPERPLASIA WITH LOWER URINARY TRACT SYMPTOMS: ICD-10-CM

## 2020-09-28 DIAGNOSIS — I10 ESSENTIAL HYPERTENSION WITH GOAL BLOOD PRESSURE LESS THAN 140/90: ICD-10-CM

## 2020-09-28 DIAGNOSIS — F33.8 SEASONAL DEPRESSION (H): ICD-10-CM

## 2020-09-28 DIAGNOSIS — Z00.00 ENCOUNTER FOR MEDICARE ANNUAL WELLNESS EXAM: Primary | ICD-10-CM

## 2020-09-28 LAB
ANION GAP SERPL CALCULATED.3IONS-SCNC: 8 MMOL/L (ref 3–14)
BUN SERPL-MCNC: 21 MG/DL (ref 7–30)
CALCIUM SERPL-MCNC: 9.6 MG/DL (ref 8.5–10.1)
CHLORIDE SERPL-SCNC: 103 MMOL/L (ref 94–109)
CHOLEST SERPL-MCNC: 168 MG/DL
CO2 SERPL-SCNC: 25 MMOL/L (ref 20–32)
CREAT SERPL-MCNC: 1.16 MG/DL (ref 0.66–1.25)
GFR SERPL CREATININE-BSD FRML MDRD: 62 ML/MIN/{1.73_M2}
GLUCOSE SERPL-MCNC: 98 MG/DL (ref 70–99)
HDLC SERPL-MCNC: 56 MG/DL
LDLC SERPL CALC-MCNC: 86 MG/DL
NONHDLC SERPL-MCNC: 112 MG/DL
POTASSIUM SERPL-SCNC: 3.2 MMOL/L (ref 3.4–5.3)
SODIUM SERPL-SCNC: 136 MMOL/L (ref 133–144)
TRIGL SERPL-MCNC: 131 MG/DL
TSH SERPL DL<=0.005 MIU/L-ACNC: 2.38 MU/L (ref 0.4–4)

## 2020-09-28 PROCEDURE — G0439 PPPS, SUBSEQ VISIT: HCPCS | Performed by: FAMILY MEDICINE

## 2020-09-28 PROCEDURE — 90662 IIV NO PRSV INCREASED AG IM: CPT | Performed by: FAMILY MEDICINE

## 2020-09-28 PROCEDURE — 36415 COLL VENOUS BLD VENIPUNCTURE: CPT | Performed by: FAMILY MEDICINE

## 2020-09-28 PROCEDURE — 84443 ASSAY THYROID STIM HORMONE: CPT | Performed by: FAMILY MEDICINE

## 2020-09-28 PROCEDURE — 80061 LIPID PANEL: CPT | Performed by: FAMILY MEDICINE

## 2020-09-28 PROCEDURE — 80048 BASIC METABOLIC PNL TOTAL CA: CPT | Performed by: FAMILY MEDICINE

## 2020-09-28 PROCEDURE — G0008 ADMIN INFLUENZA VIRUS VAC: HCPCS | Performed by: FAMILY MEDICINE

## 2020-09-28 RX ORDER — DULOXETIN HYDROCHLORIDE 30 MG/1
30 CAPSULE, DELAYED RELEASE ORAL DAILY
Qty: 90 CAPSULE | Refills: 3 | Status: SHIPPED | OUTPATIENT
Start: 2020-09-28 | End: 2021-09-28

## 2020-09-28 RX ORDER — VALSARTAN AND HYDROCHLOROTHIAZIDE 320; 25 MG/1; MG/1
1 TABLET, FILM COATED ORAL DAILY
Qty: 90 TABLET | Refills: 3 | Status: SHIPPED | OUTPATIENT
Start: 2020-09-28 | End: 2021-09-20

## 2020-09-28 RX ORDER — ESOMEPRAZOLE MAGNESIUM 40 MG/1
CAPSULE, DELAYED RELEASE ORAL
Qty: 90 CAPSULE | Refills: 3 | Status: SHIPPED | OUTPATIENT
Start: 2020-09-28 | End: 2021-09-20

## 2020-09-28 RX ORDER — FINASTERIDE 5 MG/1
1 TABLET, FILM COATED ORAL DAILY
Qty: 90 TABLET | Refills: 3 | Status: SHIPPED | OUTPATIENT
Start: 2020-09-28 | End: 2021-09-20

## 2020-09-28 RX ORDER — ROSUVASTATIN CALCIUM 10 MG/1
10 TABLET, COATED ORAL DAILY
Qty: 90 TABLET | Refills: 3 | Status: SHIPPED | OUTPATIENT
Start: 2020-09-28 | End: 2021-09-20

## 2020-09-28 ASSESSMENT — PATIENT HEALTH QUESTIONNAIRE - PHQ9: SUM OF ALL RESPONSES TO PHQ QUESTIONS 1-9: 2

## 2020-09-28 ASSESSMENT — MIFFLIN-ST. JEOR: SCORE: 1822.87

## 2020-09-28 NOTE — NURSING NOTE
"Chief Complaint   Patient presents with     Physical     initial /86 (BP Location: Right arm, Cuff Size: Adult Large)   Pulse 80   Temp 97.8  F (36.6  C) (Oral)   Resp 16   Ht 1.816 m (5' 11.5\")   Wt 104.8 kg (231 lb)   SpO2 95%   BMI 31.77 kg/m   Estimated body mass index is 31.77 kg/m  as calculated from the following:    Height as of this encounter: 1.816 m (5' 11.5\").    Weight as of this encounter: 104.8 kg (231 lb).  BP completed using cuff size: large.   R arm      Health Maintenance that is potentially due pending provider review:  NONE    n/a    Neri Dumont ma  "

## 2020-09-28 NOTE — PROGRESS NOTES
SUBJECTIVE:   Jerry Miguel is a 75 year old male who presents for Preventive Visit.      Patient has been advised of split billing requirements and indicates understanding: Yes   Are you in the first 12 months of your Medicare coverage?  No    HPI  Do you feel safe in your environment? Yes    Have you ever done Advance Care Planning? (For example, a Health Directive, POLST, or a discussion with a medical provider or your loved ones about your wishes): No, advance care planning information given to patient to review.  Patient declined advance care planning discussion at this time.      Fall risk       Cognitive Screening   1) Repeat 3 items (Leader, Season, Table)    2) Clock draw: normal  3) 3 item recall: Recalls 3 objects  Results: 3 items recalled: COGNITIVE IMPAIRMENT LESS LIKELY    Mini-CogTM Copyright S Chidi. Licensed by the author for use in [place] too.me; reprinted with permission (ana@[facility]). All rights reserved.      Do you have sleep apnea, excessive snoring or daytime drowsiness?: yes    Reviewed and updated as needed this visit by clinical staff  Tobacco  Allergies  Meds         Reviewed and updated as needed this visit by Provider        Social History     Tobacco Use     Smoking status: Former Smoker     Types: Cigarettes     Smokeless tobacco: Never Used   Substance Use Topics     Alcohol use: Yes     Alcohol/week: 0.0 standard drinks     Comment: 3x week     If you drink alcohol do you typically have >3 drinks per day or >7 drinks per week? No    No flowsheet data found.        PROBLEMS TO ADD ON...    Current providers sharing in care for this patient include:   Patient Care Team:  Amaury Lyon MD as PCP - General (Family Practice)  Francoise Morales MD as MD (Ophthalmology)  Manuel Ponce MD as MD (Orthopaedic Surgery)  Amaury Lyon MD as Assigned PCP    The following health maintenance items are reviewed in Epic and correct as of  "today:  Health Maintenance   Topic Date Due     ZOSTER IMMUNIZATION (1 of 2) 07/08/1997     ADVANCE CARE PLANNING  12/14/2017     BMP  05/21/2020     INFLUENZA VACCINE (1) 09/01/2020     LIPID  09/19/2020     MEDICARE ANNUAL WELLNESS VISIT  09/19/2020     FALL RISK ASSESSMENT  02/11/2021     COLORECTAL CANCER SCREENING  03/02/2021     DTAP/TDAP/TD IMMUNIZATION (2 - Td) 11/25/2024     HEPATITIS C SCREENING  Completed     PHQ-2  Completed     PNEUMOCOCCAL IMMUNIZATION 65+ LOW/MEDIUM RISK  Completed     AORTIC ANEURYSM SCREENING (SYSTEM ASSIGNED)  Completed     IPV IMMUNIZATION  Aged Out     MENINGITIS IMMUNIZATION  Aged Out     HEPATITIS B IMMUNIZATION  Aged Out     Labs reviewed in EPIC  Pneumonia Vaccine:Adults age 65+ who have not received previous Pneumovax (PPSV23) or PCV13 as an adult: Should first be given PCV13 AND then should be given PPSV23 6-12 months after PCV13    Review of Systems  Constitutional, HEENT, cardiovascular, pulmonary, GI, , musculoskeletal, neuro, skin, endocrine and psych systems are negative, except as otherwise noted.    OBJECTIVE:   /86 (BP Location: Right arm, Cuff Size: Adult Large)   Pulse 80   Temp 97.8  F (36.6  C) (Oral)   Resp 16   Ht 1.816 m (5' 11.5\")   Wt 104.8 kg (231 lb)   SpO2 95%   BMI 31.77 kg/m   Estimated body mass index is 31.77 kg/m  as calculated from the following:    Height as of this encounter: 1.816 m (5' 11.5\").    Weight as of this encounter: 104.8 kg (231 lb).  Physical Exam    General Appearance: Pleasant, alert, in no acute respiratory distress.  Head Exam: Normal. Normocephalic, atraumatic. No sinus tenderness.  Eye Exam: Normal external eye, conjunctiva, lids. ZHAO.  Ear Exam: Normal auditory canals and external ears. Non-tender.  Left TM-normal. Right TM-normal.  OroPharynx Exam: Dental hygiene adequate. Normal buccal mucosa. Normal pharynx.  Neck Exam: Supple, no masses or enlarged, tender nodes.  Thyroid Exam: No nodules or enlargement or " "tenderness.  Chest/Respiratory Exam: Normal, comfortable, easy respirations. Chest wall normal. Lungs are clear to auscultation. No wheezing, crackles, or rhonchi.  Cardiovascular Exam: Regular rate and rhythm. No murmur, gallop, or rubs. No pedal edema.  Gastrointestinal Exam: Soft, non-tender, no masses or organomegaly.  Musculoskeletal Exam: Back is non-tender, full ROM of upper and lower extremities.  Skin: no rash, warm and dry.    Neurologic Exam: Nonfocal, no tremor. Normal gait.  Psychiatric Exam: Alert - appropriate, normal affect    ASSESSMENT / PLAN:       ICD-10-CM    1. Encounter for Medicare annual wellness exam  Z00.00    2. Essential hypertension with goal blood pressure less than 140/90  I10 valsartan-hydrochlorothiazide (DIOVAN HCT) 320-25 MG tablet     Basic metabolic panel   3. Hyperlipidemia LDL goal <130  E78.5 rosuvastatin (CRESTOR) 10 MG tablet     Lipid Profile (Chol, Trig, HDL, LDL calc)   4. Benign non-nodular prostatic hyperplasia with lower urinary tract symptoms  N40.1 finasteride (PROSCAR) 5 MG tablet   5. Gastroesophageal reflux disease, esophagitis presence not specified  K21.9 esomeprazole (NEXIUM) 40 MG DR capsule   6. Seasonal depression (H)  F33.8 DULoxetine (CYMBALTA) 30 MG capsule     TSH with free T4 reflex   7. Need for vaccination  Z23 HC FLU VACCINE, INCREASED ANTIGEN, PRESV FREE       Patient has been advised of split billing requirements and indicates understanding: Yes  COUNSELING:  Reviewed preventive health counseling, as reflected in patient instructions       Regular exercise       Healthy diet/nutrition       Vision screening       Hearing screening       Dental care       Colon cancer screening       Prostate cancer screening    Estimated body mass index is 31.77 kg/m  as calculated from the following:    Height as of this encounter: 1.816 m (5' 11.5\").    Weight as of this encounter: 104.8 kg (231 lb).        He reports that he has quit smoking. His smoking use " included cigarettes. He has never used smokeless tobacco.      Appropriate preventive services were discussed with this patient, including applicable screening as appropriate for cardiovascular disease, diabetes, osteopenia/osteoporosis, and glaucoma.  As appropriate for age/gender, discussed screening for colorectal cancer, prostate cancer, breast cancer, and cervical cancer. Checklist reviewing preventive services available has been given to the patient.    Reviewed patients plan of care and provided an AVS. The Intermediate Care Plan ( asthma action plan, low back pain action plan, and migraine action plan) for Jerry meets the Care Plan requirement. This Care Plan has been established and reviewed with the Patient.    Counseling Resources:  ATP IV Guidelines  Pooled Cohorts Equation Calculator  Breast Cancer Risk Calculator  Breast Cancer: Medication to Reduce Risk  FRAX Risk Assessment  ICSI Preventive Guidelines  Dietary Guidelines for Americans, 2010  USDA's MyPlate  ASA Prophylaxis  Lung CA Screening    Amaury Alejandro Lyon MD  [facility]    Identified Health Risks:

## 2020-09-28 NOTE — PATIENT INSTRUCTIONS
Patient Education   Personalized Prevention Plan  You are due for the preventive services outlined below.  Your care team is available to assist you in scheduling these services.  If you have already completed any of these items, please share that information with your care team to update in your medical record.  Health Maintenance Due   Topic Date Due     Zoster (Shingles) Vaccine (1 of 2) 07/08/1997     Discuss Advance Care Planning  12/14/2017     Basic Metabolic Panel  05/21/2020     Flu Vaccine (1) 09/01/2020     Cholesterol Lab  09/19/2020     Annual Wellness Visit  09/19/2020

## 2021-06-09 ENCOUNTER — OFFICE VISIT (OUTPATIENT)
Dept: OPHTHALMOLOGY | Facility: CLINIC | Age: 76
End: 2021-06-09
Attending: OPHTHALMOLOGY
Payer: MEDICARE

## 2021-06-09 DIAGNOSIS — H52.4 MYOPIA WITH PRESBYOPIA OF BOTH EYES: ICD-10-CM

## 2021-06-09 DIAGNOSIS — H52.13 MYOPIA WITH PRESBYOPIA OF BOTH EYES: ICD-10-CM

## 2021-06-09 DIAGNOSIS — H25.813 COMBINED FORM OF AGE-RELATED CATARACT, BOTH EYES: Primary | ICD-10-CM

## 2021-06-09 PROCEDURE — 92014 COMPRE OPH EXAM EST PT 1/>: CPT | Mod: GC | Performed by: OPHTHALMOLOGY

## 2021-06-09 PROCEDURE — 92015 DETERMINE REFRACTIVE STATE: CPT | Mod: GY

## 2021-06-09 PROCEDURE — G0463 HOSPITAL OUTPT CLINIC VISIT: HCPCS

## 2021-06-09 ASSESSMENT — REFRACTION_MANIFEST
OD_ADD: +2.75
OS_CYLINDER: +0.25
OD_CYLINDER: SPHERE
OD_SPHERE: -2.00
OS_ADD: +2.75
OS_AXIS: 090
OS_SPHERE: -2.00

## 2021-06-09 ASSESSMENT — VISUAL ACUITY
CORRECTION_TYPE: GLASSES
METHOD: SNELLEN - LINEAR
OS_CC: 20/30
OS_PH_CC: 20/20
OD_CC: 20/30
OD_PH_CC: 20/25

## 2021-06-09 ASSESSMENT — TONOMETRY
IOP_METHOD: TONOPEN
OD_IOP_MMHG: 14
OS_IOP_MMHG: 14

## 2021-06-09 ASSESSMENT — EXTERNAL EXAM - LEFT EYE: OS_EXAM: NORMAL

## 2021-06-09 ASSESSMENT — CONF VISUAL FIELD
OS_NORMAL: 1
OD_NORMAL: 1

## 2021-06-09 ASSESSMENT — CUP TO DISC RATIO
OD_RATIO: 0.4
OS_RATIO: 0.3

## 2021-06-09 ASSESSMENT — SLIT LAMP EXAM - LIDS
COMMENTS: NORMAL
COMMENTS: NORMAL

## 2021-06-09 ASSESSMENT — EXTERNAL EXAM - RIGHT EYE: OD_EXAM: NORMAL

## 2021-06-09 ASSESSMENT — REFRACTION_WEARINGRX
OD_SPHERE: -1.00
OS_AXIS: 092
OS_ADD: +2.50
OS_SPHERE: -1.00
OS_CYLINDER: +0.75
OD_ADD: +2.50

## 2021-06-09 NOTE — PROGRESS NOTES
Chief Complaint(s) and History of Present Illness(es)     Annual Eye Exam     Laterality: both eyes    Onset: gradual    Onset: 3 months ago    Quality: blurred vision    Frequency: constantly    Timing: throughout the day    Context: distance vision    Associated symptoms: haloes and floaters.  Negative for flashes, eye pain   and photophobia    Treatments tried: no treatments    Pain scale: 0/10              Comments     Routine exam  Thinks cataracts have gotten worse   Trouble w lights when driving at night  Brenda Crespo COA 8:49 AM June 9, 2021        HPI:  Jerry Miguel is a 75 year old male     Feels vision has been more blurry in both eyes, unsure which one more, maybe the left. Has some chronic glare, but no change. No flashes, floaters, or pain. EMILIE was in 12/2019.    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    Dasha Jacques MD  PGY-2 Resident Physician  Department of Ophthalmology      Teaching statement:  Complete documentation of historical and exam elements from today's encounter can be found in the full encounter summary report (not reduplicated in this progress note). I personally obtained the chief complaint(s) and history of present illness.  I confirmed and edited as necessary the review of systems, past medical/surgical history, family history, social history, and examination findings as documented by others; and I examined the patient myself. I personally reviewed the relevant  tests, images, and reports as documented above.     I formulated and edited as necessary the assessment and plan and discussed the findings and management plan with the patient and family.      Bridgette Rainey MD  Comprehensive Ophthalmology & Ocular Pathology  Department of Ophthalmology and Visual Neurosciences  [email or web address]  Pager [phone]

## 2021-06-09 NOTE — NURSING NOTE
Chief Complaints and History of Present Illnesses   Patient presents with     Annual Eye Exam     Chief Complaint(s) and History of Present Illness(es)     Annual Eye Exam     Laterality: both eyes    Onset: gradual    Onset: 3 months ago    Quality: blurred vision    Frequency: constantly    Timing: throughout the day    Context: distance vision    Associated symptoms: haloes and floaters.  Negative for flashes, eye pain and photophobia    Treatments tried: no treatments    Pain scale: 0/10              Comments     Routine exam  Thinks cataracts have gotten worse   Trouble w lights when driving at night  Brenda Crespo COA 8:49 AM June 9, 2021

## 2021-09-01 ENCOUNTER — OFFICE VISIT (OUTPATIENT)
Dept: DERMATOLOGY | Facility: CLINIC | Age: 76
End: 2021-09-01
Payer: MEDICARE

## 2021-09-01 VITALS
DIASTOLIC BLOOD PRESSURE: 69 MMHG | HEART RATE: 99 BPM | BODY MASS INDEX: 28.47 KG/M2 | OXYGEN SATURATION: 96 % | WEIGHT: 207 LBS | SYSTOLIC BLOOD PRESSURE: 103 MMHG

## 2021-09-01 DIAGNOSIS — D18.01 CHERRY ANGIOMA: ICD-10-CM

## 2021-09-01 DIAGNOSIS — L82.1 SEBORRHEIC KERATOSIS: ICD-10-CM

## 2021-09-01 DIAGNOSIS — L21.9 DERMATITIS, SEBORRHEIC: Primary | ICD-10-CM

## 2021-09-01 DIAGNOSIS — L81.4 LENTIGO: ICD-10-CM

## 2021-09-01 PROCEDURE — 99203 OFFICE O/P NEW LOW 30 MIN: CPT | Performed by: PHYSICIAN ASSISTANT

## 2021-09-01 RX ORDER — KETOCONAZOLE 20 MG/G
CREAM TOPICAL
Qty: 60 G | Refills: 4 | Status: SHIPPED | OUTPATIENT
Start: 2021-09-01 | End: 2022-10-20

## 2021-09-01 RX ORDER — TRIAMCINOLONE ACETONIDE 1 MG/G
CREAM TOPICAL
Qty: 15 G | Refills: 4 | Status: SHIPPED | OUTPATIENT
Start: 2021-09-01 | End: 2022-10-20

## 2021-09-01 NOTE — LETTER
9/1/2021         RE: Jerry Miguel  [address] [facility] [address]        Dear Colleague,    Thank you for referring your patient, Jerry Miguel, to the [facility]. Please see a copy of my visit note below.    Jerry Miguel is an extremely pleasant 76 year old year old male patient here today for rash on face, on/off for years. He notes he has used topical steroid in the past which has helped. He also has been using metronidazole. He reports he had an area on lower chest that he was concerned about but has since resolved. Patient has no other skin complaints today.  Remainder of the HPI, Meds, PMH, Allergies, FH, and SH was reviewed in chart.    Pertinent Hx:   No personal history of skin cancer.   Past Medical History:   Diagnosis Date     Arthritis     knee     BPH (benign prostatic hyperplasia)      Chronic prostatitis      Gastro-oesophageal reflux disease      Hypertension      Kidney stone      Shingles      Thyroid disease 2001-about    h/o hyperthyroid,treated, now normal       Past Surgical History:   Procedure Laterality Date     APPENDECTOMY       ARTHROPLASTY KNEE Right 1/4/2016    Procedure: ARTHROPLASTY KNEE;  Surgeon: Manuel Ponce MD;  Location: [facility] OR     COLONOSCOPY       COLONOSCOPY WITH CO2 INSUFFLATION N/A 3/2/2018    Procedure: COLONOSCOPY WITH CO2 INSUFFLATION;  Colonoscopy, Constipation, unspecified constipation type Recent change in frequency of bowel movements, [place], BMI 31.52, CVS [place];  Surgeon: Edith Craig MD;  Location: [facility] OR     ENT SURGERY      tonsillectomy     LASER HOLMIUM LITHOTRIPSY URETER(S), INSERT STENT, COMBINED  10/11/2012    Procedure: COMBINED CYSTOSCOPY, URETEROSCOPY, LASER HOLMIUM LITHOTRIPSY URETER(S), INSERT STENT;  CYSTOSCOPY, ATTEMPTED RIGHT URETEROSCOPY, INSERT RIGHT URETERAL STENT, Laser [place];  Surgeon: Petar Ulrich MD;  Location: [facility] OR        Family History   Problem Relation  Age of Onset     Cancer Mother         ovarian     Cancer Father         lung     Heart Disease Father         possibly     Cancer Paternal Grandfather         esophageal cancer     Cancer Brother 68        prostate cancer     Diabetes Maternal Grandmother      Breast Cancer No family hx of      Cancer - colorectal No family hx of      Coronary Artery Disease No family hx of      Hypertension No family hx of      Hyperlipidemia No family hx of      Cerebrovascular Disease No family hx of      Colon Cancer No family hx of      Prostate Cancer No family hx of      Other Cancer No family hx of      Depression No family hx of      Anxiety Disorder No family hx of      Mental Illness No family hx of      Substance Abuse No family hx of      Anesthesia Reaction No family hx of      Asthma No family hx of      Osteoporosis No family hx of      Genetic Disorder No family hx of      Thyroid Disease No family hx of      Obesity No family hx of      Unknown/Adopted No family hx of      Macular Degeneration No family hx of      Glaucoma No family hx of        Social History     Socioeconomic History     Marital status:      Spouse name: Not on file     Number of children: Not on file     Years of education: Not on file     Highest education level: Not on file   Occupational History     Not on file   Tobacco Use     Smoking status: Former Smoker     Types: Cigarettes     Smokeless tobacco: Never Used   Substance and Sexual Activity     Alcohol use: Yes     Alcohol/week: 0.0 standard drinks     Comment: 3x week     Drug use: No     Sexual activity: Yes     Partners: Female   Other Topics Concern     Parent/sibling w/ CABG, MI or angioplasty before 65F 55M? No   Social History Narrative     Not on file     Social Determinants of Health     Financial Resource Strain:      Difficulty of Paying Living Expenses:    Food Insecurity:      Worried About Running Out of Food in the Last Year:      Ran Out of Food in the Last Year:     Transportation Needs:      Lack of Transportation (Medical):      Lack of Transportation (Non-Medical):    Physical Activity:      Days of Exercise per Week:      Minutes of Exercise per Session:    Stress:      Feeling of Stress :    Social Connections:      Frequency of Communication with Friends and Family:      Frequency of Social Gatherings with Friends and Family:      Attends [employer] Services:      Active Member of Clubs or Organizations:      Attends Club or Organization Meetings:      Marital Status:    Intimate Partner Violence:      Fear of Current or Ex-Partner:      Emotionally Abused:      Physically Abused:      Sexually Abused:        Outpatient Encounter Medications as of 9/1/2021   Medication Sig Dispense Refill     DULoxetine (CYMBALTA) 30 MG capsule Take 1 capsule (30 mg) by mouth daily 90 capsule 3     esomeprazole (NEXIUM) 40 MG DR capsule Take 30-60 minutes before eating. 90 capsule 3     finasteride (PROSCAR) 5 MG tablet Take 1 tablet (5 mg) by mouth daily 90 tablet 3     fluticasone (VERAMYST) 27.5 MCG/SPRAY spray Spray 1 spray into both nostrils daily       ketoconazole (NIZORAL) 2 % external cream Apply daily to face 60 g 4     metroNIDAZOLE (METROCREAM) 0.75 % external cream Apply topically 2 times daily 45 g 1     rosuvastatin (CRESTOR) 10 MG tablet Take 1 tablet (10 mg) by mouth daily 90 tablet 3     triamcinolone (KENALOG) 0.1 % external cream Apply sparingly daily for one week. 15 g 4     valsartan-hydrochlorothiazide (DIOVAN HCT) 320-25 MG tablet Take 1 tablet by mouth daily 90 tablet 3     VITAMIN D, CHOLECALCIFEROL, PO Take 400 Units by mouth daily       No facility-administered encounter medications on file as of 9/1/2021.             O:   NAD, WDWN, Alert & Oriented, Mood & Affect wnl, Vitals stable   Here today alone   /69 (BP Location: Right arm, Patient Position: Sitting, Cuff Size: Adult Large)   Pulse 99   Wt 93.9 kg (207 lb)   SpO2 96%   BMI 28.47 kg/m      General appearance normal   Vitals stable   Alert, oriented and in no acute distress     Stuck on papules and brown macules on trunk and ext   Red papules on trunk  Mild scaly greasy plaques on NLF, alar crease     Eyes: Conjunctivae/lids:Normal     ENT: Lips: normal    MSK:Normal    Pulm: Breathing Normal    Neuro/Psych: Orientation:Alert and Orientedx3 ; Mood/Affect:normal     A/P:  1. Seborrheic dermatitis   Use ketoconazole cream daily to face.   Apply triamcinolone daily as needed for rash (use 1 week max)  2. Seborrheic keratosis, lentigo,cherry angioma  BENIGN LESIONS DISCUSSED WITH PATIENT:  I discussed the specifics of tumor, prognosis, and genetics of benign lesions.  I explained that treatment of these lesions would be purely cosmetic and not medically neccessary.  I discussed with patient different removal options including excision, cautery and /or laser.      Nature and genetics of benign skin lesions dicussed with patient.  Signs and Symptoms of skin cancer discussed with patient.  ABCDEs of melanoma reviewed with patient.  Patient encouraged to perform monthly skin exams.  UV precautions reviewed with patient.  Risks of non-melanoma skin cancer discussed with patient   Return to clinic as needed.         Again, thank you for allowing me to participate in the care of your patient.        Sincerely,        Jeny Witt PA-C

## 2021-09-01 NOTE — NURSING NOTE
"Chief Complaint   Patient presents with     Rosacea     Derm Problem     lump under sking on the left rib area     Skin Check       Vitals:    09/01/21 1457   BP: 103/69   BP Location: Right arm   Patient Position: Sitting   Cuff Size: Adult Large   Pulse: 99   SpO2: 96%   Weight: 93.9 kg (207 lb)     Wt Readings from Last 1 Encounters:   09/01/21 93.9 kg (207 lb)     Ht Readings from Last 1 Encounters:   09/28/20 1.816 m (5' 11.5\")       Lizy Zavala [facility], 9/1/2021 2:58 PM    "

## 2021-09-01 NOTE — PROGRESS NOTES
Jerry Miguel is an extremely pleasant 76 year old year old male patient here today for rash on face, on/off for years. He notes he has used topical steroid in the past which has helped. He also has been using metronidazole. He reports he had an area on lower chest that he was concerned about but has since resolved. Patient has no other skin complaints today.  Remainder of the HPI, Meds, PMH, Allergies, FH, and SH was reviewed in chart.    Pertinent Hx:   No personal history of skin cancer.   Past Medical History:   Diagnosis Date     Arthritis     knee     BPH (benign prostatic hyperplasia)      Chronic prostatitis      Gastro-oesophageal reflux disease      Hypertension      Kidney stone      Shingles      Thyroid disease 2001-about    h/o hyperthyroid,treated, now normal       Past Surgical History:   Procedure Laterality Date     APPENDECTOMY       ARTHROPLASTY KNEE Right 1/4/2016    Procedure: ARTHROPLASTY KNEE;  Surgeon: Manuel Ponce MD;  Location: [facility] OR     COLONOSCOPY       COLONOSCOPY WITH CO2 INSUFFLATION N/A 3/2/2018    Procedure: COLONOSCOPY WITH CO2 INSUFFLATION;  Colonoscopy, Constipation, unspecified constipation type Recent change in frequency of bowel movements, [place], BMI 31.52, [facility];  Surgeon: Edith Craig MD;  Location: [facility] OR     ENT SURGERY      tonsillectomy     LASER HOLMIUM LITHOTRIPSY URETER(S), INSERT STENT, COMBINED  10/11/2012    Procedure: COMBINED CYSTOSCOPY, URETEROSCOPY, LASER HOLMIUM LITHOTRIPSY URETER(S), INSERT STENT;  CYSTOSCOPY, ATTEMPTED RIGHT URETEROSCOPY, INSERT RIGHT URETERAL STENT, [facility] [place];  Surgeon: Petar Ulrich MD;  Location: [facility] OR        Family History   Problem Relation Age of Onset     Cancer Mother         ovarian     Cancer Father         lung     Heart Disease Father         possibly     Cancer Paternal Grandfather         esophageal cancer     Cancer Brother 68        prostate cancer     Diabetes Maternal Grandmother       Breast Cancer No family hx of      Cancer - colorectal No family hx of      Coronary Artery Disease No family hx of      Hypertension No family hx of      Hyperlipidemia No family hx of      Cerebrovascular Disease No family hx of      Colon Cancer No family hx of      Prostate Cancer No family hx of      Other Cancer No family hx of      Depression No family hx of      Anxiety Disorder No family hx of      Mental Illness No family hx of      Substance Abuse No family hx of      Anesthesia Reaction No family hx of      Asthma No family hx of      Osteoporosis No family hx of      Genetic Disorder No family hx of      Thyroid Disease No family hx of      Obesity No family hx of      Unknown/Adopted No family hx of      Macular Degeneration No family hx of      Glaucoma No family hx of        Social History     Socioeconomic History     Marital status:      Spouse name: Not on file     Number of children: Not on file     Years of education: Not on file     Highest education level: Not on file   Occupational History     Not on file   Tobacco Use     Smoking status: Former Smoker     Types: Cigarettes     Smokeless tobacco: Never Used   Substance and Sexual Activity     Alcohol use: Yes     Alcohol/week: 0.0 standard drinks     Comment: 3x week     Drug use: No     Sexual activity: Yes     Partners: Female   Other Topics Concern     Parent/sibling w/ CABG, MI or angioplasty before 65F 55M? No   Social History Narrative     Not on file     Social Determinants of Health     Financial Resource Strain:      Difficulty of Paying Living Expenses:    Food Insecurity:      Worried About Running Out of Food in the Last Year:      Ran Out of Food in the Last Year:    Transportation Needs:      Lack of Transportation (Medical):      Lack of Transportation (Non-Medical):    Physical Activity:      Days of Exercise per Week:      Minutes of Exercise per Session:    Stress:      Feeling of Stress :    Social Connections:       Frequency of Communication with Friends and Family:      Frequency of Social Gatherings with Friends and Family:      Attends Sikh Services:      Active Member of Clubs or Organizations:      Attends Club or Organization Meetings:      Marital Status:    Intimate Partner Violence:      Fear of Current or Ex-Partner:      Emotionally Abused:      Physically Abused:      Sexually Abused:        Outpatient Encounter Medications as of 9/1/2021   Medication Sig Dispense Refill     DULoxetine (CYMBALTA) 30 MG capsule Take 1 capsule (30 mg) by mouth daily 90 capsule 3     esomeprazole (NEXIUM) 40 MG DR capsule Take 30-60 minutes before eating. 90 capsule 3     finasteride (PROSCAR) 5 MG tablet Take 1 tablet (5 mg) by mouth daily 90 tablet 3     fluticasone (VERAMYST) 27.5 MCG/SPRAY spray Spray 1 spray into both nostrils daily       ketoconazole (NIZORAL) 2 % external cream Apply daily to face 60 g 4     metroNIDAZOLE (METROCREAM) 0.75 % external cream Apply topically 2 times daily 45 g 1     rosuvastatin (CRESTOR) 10 MG tablet Take 1 tablet (10 mg) by mouth daily 90 tablet 3     triamcinolone (KENALOG) 0.1 % external cream Apply sparingly daily for one week. 15 g 4     valsartan-hydrochlorothiazide (DIOVAN HCT) 320-25 MG tablet Take 1 tablet by mouth daily 90 tablet 3     VITAMIN D, CHOLECALCIFEROL, PO Take 400 Units by mouth daily       No facility-administered encounter medications on file as of 9/1/2021.             O:   NAD, WDWN, Alert & Oriented, Mood & Affect wnl, Vitals stable   Here today alone   /69 (BP Location: Right arm, Patient Position: Sitting, Cuff Size: Adult Large)   Pulse 99   Wt 93.9 kg (207 lb)   SpO2 96%   BMI 28.47 kg/m     General appearance normal   Vitals stable   Alert, oriented and in no acute distress     Stuck on papules and brown macules on trunk and ext   Red papules on trunk  Mild scaly greasy plaques on NLF, alar crease     Eyes: Conjunctivae/lids:Normal     ENT: Lips:  normal    MSK:Normal    Pulm: Breathing Normal    Neuro/Psych: Orientation:Alert and Orientedx3 ; Mood/Affect:normal     A/P:  1. Seborrheic dermatitis   Use ketoconazole cream daily to face.   Apply triamcinolone daily as needed for rash (use 1 week max)  2. Seborrheic keratosis, lentigo,cherry angioma  BENIGN LESIONS DISCUSSED WITH PATIENT:  I discussed the specifics of tumor, prognosis, and genetics of benign lesions.  I explained that treatment of these lesions would be purely cosmetic and not medically neccessary.  I discussed with patient different removal options including excision, cautery and /or laser.      Nature and genetics of benign skin lesions dicussed with patient.  Signs and Symptoms of skin cancer discussed with patient.  ABCDEs of melanoma reviewed with patient.  Patient encouraged to perform monthly skin exams.  UV precautions reviewed with patient.  Risks of non-melanoma skin cancer discussed with patient   Return to clinic as needed.

## 2021-09-18 DIAGNOSIS — K21.9 GASTROESOPHAGEAL REFLUX DISEASE: ICD-10-CM

## 2021-09-18 DIAGNOSIS — I10 ESSENTIAL HYPERTENSION WITH GOAL BLOOD PRESSURE LESS THAN 140/90: ICD-10-CM

## 2021-09-18 DIAGNOSIS — E78.5 HYPERLIPIDEMIA LDL GOAL <130: ICD-10-CM

## 2021-09-18 DIAGNOSIS — N40.1 BENIGN NON-NODULAR PROSTATIC HYPERPLASIA WITH LOWER URINARY TRACT SYMPTOMS: ICD-10-CM

## 2021-09-20 RX ORDER — ESOMEPRAZOLE MAGNESIUM 40 MG/1
CAPSULE, DELAYED RELEASE ORAL
Qty: 90 CAPSULE | Refills: 0 | Status: SHIPPED | OUTPATIENT
Start: 2021-09-20 | End: 2021-12-01

## 2021-09-20 RX ORDER — ROSUVASTATIN CALCIUM 10 MG/1
TABLET, COATED ORAL
Qty: 90 TABLET | Refills: 0 | Status: SHIPPED | OUTPATIENT
Start: 2021-09-20 | End: 2022-02-03

## 2021-09-20 RX ORDER — FINASTERIDE 5 MG/1
TABLET, FILM COATED ORAL
Qty: 90 TABLET | Refills: 0 | Status: SHIPPED | OUTPATIENT
Start: 2021-09-20 | End: 2022-02-03

## 2021-09-20 NOTE — TELEPHONE ENCOUNTER
"JF,    Requested Prescriptions   Pending Prescriptions Disp Refills     valsartan-hydrochlorothiazide (DIOVAN HCT) 320-25 MG tablet [Pharmacy Med Name: VALSARTAN/HCTZ TABS 320/25MG] 90 tablet 0     Sig: TAKE 1 TABLET DAILY       Angiotensin-II Receptors Failed - 9/18/2021 12:36 AM        Failed - Normal serum potassium on file in past 12 months     Recent Labs   Lab Test 09/28/20  0916   POTASSIUM 3.2*                    Passed - Last blood pressure under 140/90 in past 12 months     BP Readings from Last 3 Encounters:   09/01/21 103/69   09/28/20 131/86   02/11/20 138/88                 Passed - Recent (12 mo) or future (30 days) visit within the authorizing provider's specialty     Patient has had an office visit with the authorizing provider or a provider within the authorizing providers department within the previous 12 mos or has a future within next 30 days. See \"Patient Info\" tab in inbasket, or \"Choose Columns\" in Meds & Orders section of the refill encounter.              Passed - Medication is active on med list        Passed - Patient is age 18 or older        Passed - Normal serum creatinine on file in past 12 months     Recent Labs   Lab Test 09/28/20  0916   CR 1.16       Ok to refill medication if creatinine is low         Diuretics (Including Combos) Protocol Failed - 9/18/2021 12:36 AM        Failed - Normal serum potassium on file in past 12 months     Recent Labs   Lab Test 09/28/20  0916   POTASSIUM 3.2*                    Passed - Blood pressure under 140/90 in past 12 months     BP Readings from Last 3 Encounters:   09/01/21 103/69   09/28/20 131/86   02/11/20 138/88                 Passed - Recent (12 mo) or future (30 days) visit within the authorizing provider's specialty     Patient has had an office visit with the authorizing provider or a provider within the authorizing providers department within the previous 12 mos or has a future within next 30 days. See \"Patient Info\" tab in inbasket, " "or \"Choose Columns\" in Meds & Orders section of the refill encounter.              Passed - Medication is active on med list        Passed - Patient is age 18 or older        Passed - Normal serum creatinine on file in past 12 months     Recent Labs   Lab Test 09/28/20  0916   CR 1.16              Passed - Normal serum sodium on file in past 12 months     Recent Labs   Lab Test 09/28/20  0916                  Signed Prescriptions Disp Refills    esomeprazole (NEXIUM) 40 MG DR capsule 90 capsule 0     Sig: TAKE 1 CAPSULE 30 TO 60 MINUTES BEFORE EATING       PPI Protocol Passed - 9/18/2021 12:36 AM        Passed - Not on Clopidogrel (unless Pantoprazole ordered)        Passed - No diagnosis of osteoporosis on record        Passed - Recent (12 mo) or future (30 days) visit within the authorizing provider's specialty     Patient has had an office visit with the authorizing provider or a provider within the authorizing providers department within the previous 12 mos or has a future within next 30 days. See \"Patient Info\" tab in inTubeMogulet, or \"Choose Columns\" in Meds & Orders section of the refill encounter.              Passed - Medication is active on med list        Passed - Patient is age 18 or older          finasteride (PROSCAR) 5 MG tablet 90 tablet 0     Sig: TAKE 1 TABLET DAILY       BPH Agents Passed - 9/18/2021 12:36 AM        Passed - Recent (12 mo) or future (30 days) visit within the authorizing provider's department     Patient has had an office visit with the authorizing provider or a provider within the authorizing providers department within the previous 12 mos or has a future within next 30 days. See \"Patient Info\" tab in inbasket, or \"Choose Columns\" in Meds & Orders section of the refill encounter.              Passed - Medication is active on med list        Passed - Patient is 18 years of age or older          rosuvastatin (CRESTOR) 10 MG tablet 90 tablet 0     Sig: TAKE 1 TABLET DAILY       " "Statins Protocol Passed - 9/18/2021 12:36 AM        Passed - LDL on file in past 12 months     Recent Labs   Lab Test 09/28/20  0916   LDL 86             Passed - No abnormal creatine kinase in past 12 months     No lab results found.             Passed - Recent (12 mo) or future (30 days) visit within the authorizing provider's specialty     Patient has had an office visit with the authorizing provider or a provider within the authorizing providers department within the previous 12 mos or has a future within next 30 days. See \"Patient Info\" tab in inbasket, or \"Choose Columns\" in Meds & Orders section of the refill encounter.              Passed - Medication is active on med list        Passed - Patient is age 18 or older           Routing refill request to provider for review/approval because:  Labs out of range:  K+    Pamela Jordan RN  [facility]          "

## 2021-09-21 RX ORDER — VALSARTAN AND HYDROCHLOROTHIAZIDE 320; 25 MG/1; MG/1
TABLET, FILM COATED ORAL
Qty: 90 TABLET | Refills: 0 | Status: SHIPPED | OUTPATIENT
Start: 2021-09-21 | End: 2022-08-30

## 2021-10-02 ENCOUNTER — HEALTH MAINTENANCE LETTER (OUTPATIENT)
Age: 76
End: 2021-10-02

## 2021-11-26 ENCOUNTER — MYC REFILL (OUTPATIENT)
Dept: FAMILY MEDICINE | Facility: CLINIC | Age: 76
End: 2021-11-26
Payer: MEDICARE

## 2021-11-26 DIAGNOSIS — F33.8 SEASONAL DEPRESSION (H): ICD-10-CM

## 2021-11-27 ENCOUNTER — HEALTH MAINTENANCE LETTER (OUTPATIENT)
Age: 76
End: 2021-11-27

## 2021-11-29 RX ORDER — DULOXETIN HYDROCHLORIDE 30 MG/1
30 CAPSULE, DELAYED RELEASE ORAL DAILY
Qty: 30 CAPSULE | Refills: 0 | Status: SHIPPED | OUTPATIENT
Start: 2021-11-29 | End: 2021-12-29

## 2021-11-29 NOTE — TELEPHONE ENCOUNTER
Due for physical - see below - has new insurance Jan 2022  Ok to refill until then?  Aria YEAGER RN

## 2021-12-28 DIAGNOSIS — F33.8 SEASONAL DEPRESSION (H): ICD-10-CM

## 2021-12-29 RX ORDER — DULOXETIN HYDROCHLORIDE 30 MG/1
CAPSULE, DELAYED RELEASE ORAL
Qty: 30 CAPSULE | Refills: 0 | Status: SHIPPED | OUTPATIENT
Start: 2021-12-29 | End: 2022-02-03

## 2021-12-29 NOTE — TELEPHONE ENCOUNTER
ONE MONTH ONLY.  Medication is being filled for 1 time refill only due to:  Patient needs to be seen because it has been more than one year since last visit.

## 2021-12-31 ENCOUNTER — E-VISIT (OUTPATIENT)
Dept: URGENT CARE | Facility: CLINIC | Age: 76
End: 2021-12-31
Payer: MEDICARE

## 2021-12-31 DIAGNOSIS — J01.90 ACUTE BACTERIAL SINUSITIS: Primary | ICD-10-CM

## 2021-12-31 DIAGNOSIS — B96.89 ACUTE BACTERIAL SINUSITIS: Primary | ICD-10-CM

## 2021-12-31 PROCEDURE — 99207 PR NO BILLABLE SERVICE THIS VISIT: CPT | Performed by: PHYSICIAN ASSISTANT

## 2021-12-31 RX ORDER — AMOXICILLIN 875 MG
875 TABLET ORAL 2 TIMES DAILY
Qty: 20 TABLET | Refills: 0 | Status: SHIPPED | OUTPATIENT
Start: 2021-12-31 | End: 2022-01-10

## 2021-12-31 NOTE — PATIENT INSTRUCTIONS
Dear Jerry Miguel    After reviewing your responses, I've been able to diagnose you with?a sinus infection caused by bacteria.?     Based on your responses and diagnosis, I have prescribed amox to treat your symptoms. I have sent this to your pharmacy.?     It is also important to stay well hydrated, get lots of rest and take over-the-counter decongestants,?tylenol?or ibuprofen if you?are able to?take those medications per your primary care provider to help relieve discomfort.?     It is important that you take?all of?your prescribed medication even if your symptoms are improving after a few doses.? Taking?all of?your medicine helps prevent the symptoms from returning.?     If your symptoms worsen, you develop severe headache, vomiting, high fever (>102), or are not improving in 7 days, please contact your primary care provider for an appointment or visit any of our convenient Walk-in Care or Urgent Care Centers to be seen which can be found on our website?here.?     Thanks again for choosing?us?as your health care partner,?   ?  Marcellus Wade PA-C?

## 2022-01-25 ENCOUNTER — MYC MEDICAL ADVICE (OUTPATIENT)
Dept: FAMILY MEDICINE | Facility: CLINIC | Age: 77
End: 2022-01-25
Payer: COMMERCIAL

## 2022-02-02 NOTE — PROGRESS NOTES
"SUBJECTIVE:   Jerry Miguel is a 76 year old male who presents for Preventive Visit.      Patient has been advised of split billing requirements and indicates understanding: Yes  Are you in the first 12 months of your Medicare coverage?  No    Healthy Habits:     In general, how would you rate your overall health?  Good    Frequency of exercise:  4-5 days/week    Duration of exercise:  30-45 minutes    Do you usually eat at least 4 servings of fruit and vegetables a day, include whole grains    & fiber and avoid regularly eating high fat or \"junk\" foods?  Yes    Taking medications regularly:  Yes    Medication side effects:  None    Ability to successfully perform activities of daily living:  No assistance needed    Home Safety:  Throw rugs in the hallway and lack of grab bars in the bathroom    Hearing Impairment:  No hearing concerns    In the past 6 months, have you been bothered by leaking of urine?  No    In general, how would you rate your overall mental or emotional health?  Fair      PHQ-2 Total Score: 2    Additional concerns today:  No    Do you feel safe in your environment? Yes    Have you ever done Advance Care Planning? (For example, a Health Directive, POLST, or a discussion with a medical provider or your loved ones about your wishes): No, advance care planning information given to patient to review.  Patient plans to discuss their wishes with loved ones or provider.         Fall risk  Fallen 2 or more times in the past year?: No  Any fall with injury in the past year?: No    Cognitive Screening   1) Repeat 3 items (Leader, Season, Table)    2) Clock draw: NORMAL  3) 3 item recall: Recalls 3 objects  Results: 3 items recalled: COGNITIVE IMPAIRMENT LESS LIKELY    Mini-CogTM Copyright RANDI Murillo. Licensed by the author for use in [facility]; reprinted with permission (ana@.[facility]). All rights reserved.      Do you have sleep apnea, excessive snoring or daytime drowsiness?: no    Reviewed " and updated as needed this visit by clinical staff   Allergies  Meds             Reviewed and updated as needed this visit by Provider               Social History     Tobacco Use     Smoking status: Former Smoker     Types: Cigarettes     Smokeless tobacco: Never Used   Substance Use Topics     Alcohol use: Yes     Alcohol/week: 0.0 standard drinks     Comment: 3x week     If you drink alcohol do you typically have >3 drinks per day or >7 drinks per week? No    Alcohol Use 2/3/2022   Prescreen: >3 drinks/day or >7 drinks/week? No   Prescreen: >3 drinks/day or >7 drinks/week? -   No flowsheet data found.            Current providers sharing in care for this patient include:   Patient Care Team:  Amaury Lyon MD as PCP - General (Family Practice)  Francoise Morales MD as MD (Ophthalmology)  Manuel Ponce MD as MD (Orthopaedic Surgery)  Amaury Lyon MD as Assigned PCP  Bridgette Rainey MD as Assigned Surgical Provider    The following health maintenance items are reviewed in Epic and correct as of today:  Health Maintenance Due   Topic Date Due     ANNUAL REVIEW OF HM ORDERS  Never done     MICROALBUMIN  05/24/2014     HEMOGLOBIN  01/30/2019     COLORECTAL CANCER SCREENING  03/02/2021     BMP  03/28/2021     LIPID  09/28/2021     Lab work is in process  Labs reviewed in EPIC  BP Readings from Last 3 Encounters:   02/03/22 138/84   09/01/21 103/69   09/28/20 131/86    Wt Readings from Last 3 Encounters:   02/03/22 90.7 kg (199 lb 14.4 oz)   09/01/21 93.9 kg (207 lb)   09/28/20 104.8 kg (231 lb)                  Patient Active Problem List   Diagnosis     Osteoarthritis of knee     CARDIOVASCULAR SCREENING; LDL GOAL LESS THAN 130     Seasonal allergic rhinitis     Hyperlipidemia LDL goal <130     Benign non-nodular prostatic hyperplasia with lower urinary tract symptoms     GERD (gastroesophageal reflux disease)     Hyperthyroidism     Essential hypertension with goal blood  pressure less than 140/90     Advanced care planning/counseling discussion     Right knee DJD     CKD (chronic kidney disease) stage 3, GFR 30-59 ml/min (H)     Seasonal depression (H)     Nuclear senile cataract of both eyes     Past Surgical History:   Procedure Laterality Date     APPENDECTOMY       ARTHROPLASTY KNEE Right 1/4/2016    Procedure: ARTHROPLASTY KNEE;  Surgeon: Manuel Ponce MD;  Location: [facility]     COLONOSCOPY       COLONOSCOPY WITH CO2 INSUFFLATION N/A 3/2/2018    Procedure: COLONOSCOPY WITH CO2 INSUFFLATION;  Colonoscopy, Constipation, unspecified constipation type Recent change in frequency of bowel movements, [place], BMI 31.52, [facility] [place];  Surgeon: Edith Craig MD;  Location: [facility] OR     ENT SURGERY      tonsillectomy     LASER HOLMIUM LITHOTRIPSY URETER(S), INSERT STENT, COMBINED  10/11/2012    Procedure: COMBINED CYSTOSCOPY, URETEROSCOPY, LASER HOLMIUM LITHOTRIPSY URETER(S), INSERT STENT;  CYSTOSCOPY, ATTEMPTED RIGHT URETEROSCOPY, INSERT RIGHT URETERAL STENT, Laser [place];  Surgeon: Petar Ulrich MD;  [facility]       Social History     Tobacco Use     Smoking status: Former Smoker     Types: Cigarettes     Smokeless tobacco: Never Used   Substance Use Topics     Alcohol use: Yes     Alcohol/week: 0.0 standard drinks     Comment: 3x week     Family History   Problem Relation Age of Onset     Cancer Mother         ovarian     Cancer Father         lung     Heart Disease Father         possibly     Cancer Paternal Grandfather         esophageal cancer     Cancer Brother 68        prostate cancer     Diabetes Maternal Grandmother      Breast Cancer No family hx of      Cancer - colorectal No family hx of      Coronary Artery Disease No family hx of      Hypertension No family hx of      Hyperlipidemia No family hx of      Cerebrovascular Disease No family hx of      Colon Cancer No family hx of      Prostate Cancer No family hx of      Other Cancer No family  "hx of      Depression No family hx of      Anxiety Disorder No family hx of      Mental Illness No family hx of      Substance Abuse No family hx of      Anesthesia Reaction No family hx of      Asthma No family hx of      Osteoporosis No family hx of      Genetic Disorder No family hx of      Thyroid Disease No family hx of      Obesity No family hx of      Unknown/Adopted No family hx of      Macular Degeneration No family hx of      Glaucoma No family hx of          Current Outpatient Medications   Medication Sig Dispense Refill     DULoxetine (CYMBALTA) 30 MG capsule TAKE 1 CAPSULE BY MOUTH EVERY DAY 90 capsule 3     esomeprazole (NEXIUM) 40 MG DR capsule Take 30-60 minutes before eating. 90 capsule 3     finasteride (PROSCAR) 5 MG tablet Take 1 tablet (5 mg) by mouth daily 90 tablet 3     rosuvastatin (CRESTOR) 10 MG tablet Take 1 tablet (10 mg) by mouth daily 90 tablet 3     valsartan (DIOVAN) 80 MG tablet Take 1 tablet (80 mg) by mouth daily 90 tablet 3     fluticasone (VERAMYST) 27.5 MCG/SPRAY spray Spray 1 spray into both nostrils daily       ketoconazole (NIZORAL) 2 % external cream Apply daily to face 60 g 4     metroNIDAZOLE (METROCREAM) 0.75 % external cream Apply topically 2 times daily 45 g 1     triamcinolone (KENALOG) 0.1 % external cream Apply sparingly daily for one week. 15 g 4     valsartan-hydrochlorothiazide (DIOVAN HCT) 320-25 MG tablet TAKE 1 TABLET DAILY (Patient not taking: Reported on 2/3/2022) 90 tablet 0     VITAMIN D, CHOLECALCIFEROL, PO Take 400 Units by mouth daily             Review of Systems   Eyes: Positive for visual disturbance.   Gastrointestinal: Positive for diarrhea.   Musculoskeletal: Positive for arthralgias.         OBJECTIVE:   /84   Pulse 89   Temp 97.1  F (36.2  C)   Ht 1.785 m (5' 10.28\")   Wt 90.7 kg (199 lb 14.4 oz)   SpO2 97%   BMI 28.46 kg/m   Estimated body mass index is 28.46 kg/m  as calculated from the following:    Height as of this encounter: " "1.785 m (5' 10.28\").    Weight as of this encounter: 90.7 kg (199 lb 14.4 oz).  Physical Exam    General Appearance: Pleasant, alert, in no acute respiratory distress.  Head Exam: Normal. Normocephalic, atraumatic. No sinus tenderness.  Eye Exam: Normal external eye, conjunctiva, lids. ZHAO.  Ear Exam: Normal auditory canals and external ears. Non-tender.  Left TM-normal. Right TM-normal.  Neck Exam: Supple, no obvious thyroid enlargement  Chest/Respiratory Exam: Normal, comfortable, easy respirations. Chest wall normal. Lungs are clear to auscultation. No wheezing, crackles, or rhonchi.  Cardiovascular Exam: Regular rate and rhythm. No murmur, gallop, or rubs.  Musculoskeletal Exam: Back is non-tender, full ROM of upper and lower extremities.  Skin: no rash, warm and dry.  Neurologic Exam: Nonfocal, no tremor. Normal gait.  Psychiatric Exam: Alert - appropriate, normal affect      ASSESSMENT / PLAN:       ICD-10-CM    1. Encounter for Medicare annual wellness exam  Z00.00    2. Hyperlipidemia LDL goal <130  E78.5 rosuvastatin (CRESTOR) 10 MG tablet     Basic metabolic panel  (Ca, Cl, CO2, Creat, Gluc, K, Na, BUN)     Lipid Profile (Chol, Trig, HDL, LDL calc)     Basic metabolic panel  (Ca, Cl, CO2, Creat, Gluc, K, Na, BUN)     Lipid Profile (Chol, Trig, HDL, LDL calc)   3. Benign non-nodular prostatic hyperplasia with lower urinary tract symptoms  N40.1 finasteride (PROSCAR) 5 MG tablet   4. Gastroesophageal reflux disease with esophagitis without hemorrhage  K21.00 esomeprazole (NEXIUM) 40 MG DR capsule   5. Seasonal depression (H)  F33.8 DULoxetine (CYMBALTA) 30 MG capsule   6. Colon cancer screening  Z12.11 Adult Gastro Ref - Procedure Only   7. Essential hypertension with goal blood pressure less than 140/90  I10 Basic metabolic panel  (Ca, Cl, CO2, Creat, Gluc, K, Na, BUN)     valsartan (DIOVAN) 80 MG tablet     Basic metabolic panel  (Ca, Cl, CO2, Creat, Gluc, K, Na, BUN)   8. Stage 3a chronic kidney disease " "(H)  N18.31 Albumin Random Urine Quantitative with Creat Ratio     valsartan (DIOVAN) 80 MG tablet     Albumin Random Urine Quantitative with Creat Ratio   9. Hyperthyroidism  E05.90 TSH with free T4 reflex     TSH with free T4 reflex     Patient has stable chronic conditions as noted in A/P including f Hyperlipidemia LDL goal <130, Benign non-nodular prostatic hyperplasia with lower urinary tract symptoms, Gastroesophageal reflux disease with esophagitis without hemorrhage, Seasonal depression (H), Colon cancer screening, Essential hypertension with goal blood pressure less than 140/90, Stage 3a chronic kidney disease (H), and Hyperthyroidism were also pertinent to this visit.    These diagnoses were each reviewed for stability and medications were reviewed and refilled, labs ordered and updated.        Patient has been advised of split billing requirements and indicates understanding: Yes    COUNSELING:  Reviewed preventive health counseling, as reflected in patient instructions       Regular exercise       Healthy diet/nutrition    Estimated body mass index is 28.46 kg/m  as calculated from the following:    Height as of this encounter: 1.785 m (5' 10.28\").    Weight as of this encounter: 90.7 kg (199 lb 14.4 oz).      He reports that he has quit smoking. His smoking use included cigarettes. He has never used smokeless tobacco.      Appropriate preventive services were discussed with this patient, including applicable screening as appropriate for cardiovascular disease, diabetes, osteopenia/osteoporosis, and glaucoma.  As appropriate for age/gender, discussed screening for colorectal cancer, prostate cancer, breast cancer, and cervical cancer. Checklist reviewing preventive services available has been given to the patient.    Reviewed patients plan of care and provided an AVS. The Basic Care Plan (routine screening as documented in Health Maintenance) for Jerry meets the Care Plan requirement. This Care Plan has " been established and reviewed with the Patient.    Counseling Resources:  ATP IV Guidelines  Pooled Cohorts Equation Calculator  Breast Cancer Risk Calculator  Breast Cancer: Medication to Reduce Risk  FRAX Risk Assessment  ICSI Preventive Guidelines  Dietary Guidelines for Americans, 2010  USDA's MyPlate  ASA Prophylaxis  Lung CA Screening    Amaury Alejandro Lyon MD  [facility]    Identified Health Risks:    The patient was provided with suggestions to help him develop a healthy emotional lifestyle.

## 2022-02-03 ENCOUNTER — OFFICE VISIT (OUTPATIENT)
Dept: FAMILY MEDICINE | Facility: CLINIC | Age: 77
End: 2022-02-03
Payer: COMMERCIAL

## 2022-02-03 VITALS
OXYGEN SATURATION: 97 % | WEIGHT: 199.9 LBS | HEIGHT: 70 IN | BODY MASS INDEX: 28.62 KG/M2 | SYSTOLIC BLOOD PRESSURE: 138 MMHG | TEMPERATURE: 97.1 F | HEART RATE: 89 BPM | DIASTOLIC BLOOD PRESSURE: 84 MMHG

## 2022-02-03 DIAGNOSIS — E78.5 HYPERLIPIDEMIA LDL GOAL <130: ICD-10-CM

## 2022-02-03 DIAGNOSIS — E05.90 HYPERTHYROIDISM: ICD-10-CM

## 2022-02-03 DIAGNOSIS — I10 ESSENTIAL HYPERTENSION WITH GOAL BLOOD PRESSURE LESS THAN 140/90: ICD-10-CM

## 2022-02-03 DIAGNOSIS — K21.00 GASTROESOPHAGEAL REFLUX DISEASE WITH ESOPHAGITIS WITHOUT HEMORRHAGE: ICD-10-CM

## 2022-02-03 DIAGNOSIS — F33.8 SEASONAL DEPRESSION (H): ICD-10-CM

## 2022-02-03 DIAGNOSIS — N40.1 BENIGN NON-NODULAR PROSTATIC HYPERPLASIA WITH LOWER URINARY TRACT SYMPTOMS: ICD-10-CM

## 2022-02-03 DIAGNOSIS — Z00.00 ENCOUNTER FOR MEDICARE ANNUAL WELLNESS EXAM: Primary | ICD-10-CM

## 2022-02-03 DIAGNOSIS — Z12.11 COLON CANCER SCREENING: ICD-10-CM

## 2022-02-03 DIAGNOSIS — N18.31 STAGE 3A CHRONIC KIDNEY DISEASE (H): ICD-10-CM

## 2022-02-03 PROCEDURE — 80061 LIPID PANEL: CPT | Performed by: FAMILY MEDICINE

## 2022-02-03 PROCEDURE — 36415 COLL VENOUS BLD VENIPUNCTURE: CPT | Performed by: FAMILY MEDICINE

## 2022-02-03 PROCEDURE — 99397 PER PM REEVAL EST PAT 65+ YR: CPT | Performed by: FAMILY MEDICINE

## 2022-02-03 PROCEDURE — 84443 ASSAY THYROID STIM HORMONE: CPT | Performed by: FAMILY MEDICINE

## 2022-02-03 PROCEDURE — 80048 BASIC METABOLIC PNL TOTAL CA: CPT | Performed by: FAMILY MEDICINE

## 2022-02-03 PROCEDURE — 82043 UR ALBUMIN QUANTITATIVE: CPT | Performed by: FAMILY MEDICINE

## 2022-02-03 RX ORDER — DULOXETIN HYDROCHLORIDE 30 MG/1
CAPSULE, DELAYED RELEASE ORAL
Qty: 90 CAPSULE | Refills: 3 | Status: SHIPPED | OUTPATIENT
Start: 2022-02-03 | End: 2022-10-13

## 2022-02-03 RX ORDER — FINASTERIDE 5 MG/1
1 TABLET, FILM COATED ORAL DAILY
Qty: 90 TABLET | Refills: 3 | Status: SHIPPED | OUTPATIENT
Start: 2022-02-03 | End: 2023-02-02

## 2022-02-03 RX ORDER — VALSARTAN 80 MG/1
80 TABLET ORAL DAILY
Qty: 90 TABLET | Refills: 3 | Status: SHIPPED | OUTPATIENT
Start: 2022-02-03 | End: 2023-02-02

## 2022-02-03 RX ORDER — ROSUVASTATIN CALCIUM 10 MG/1
10 TABLET, COATED ORAL DAILY
Qty: 90 TABLET | Refills: 3 | Status: SHIPPED | OUTPATIENT
Start: 2022-02-03 | End: 2023-02-02

## 2022-02-03 RX ORDER — ESOMEPRAZOLE MAGNESIUM 40 MG/1
CAPSULE, DELAYED RELEASE ORAL
Qty: 90 CAPSULE | Refills: 3 | Status: SHIPPED | OUTPATIENT
Start: 2022-02-03 | End: 2022-10-13

## 2022-02-03 ASSESSMENT — ENCOUNTER SYMPTOMS
HEMATURIA: 0
WEAKNESS: 0
SORE THROAT: 0
ARTHRALGIAS: 1
NERVOUS/ANXIOUS: 0
FREQUENCY: 0
CONSTIPATION: 0
EYE PAIN: 0
MYALGIAS: 0
FEVER: 0
NAUSEA: 0
JOINT SWELLING: 0
CHILLS: 0
ABDOMINAL PAIN: 0
HEARTBURN: 1
COUGH: 0
DYSURIA: 0
DIZZINESS: 0
PARESTHESIAS: 0
HEMATOCHEZIA: 0
DIARRHEA: 1
HEADACHES: 0
PALPITATIONS: 0

## 2022-02-03 ASSESSMENT — ACTIVITIES OF DAILY LIVING (ADL): CURRENT_FUNCTION: NO ASSISTANCE NEEDED

## 2022-02-03 ASSESSMENT — MIFFLIN-ST. JEOR: SCORE: 1657.37

## 2022-02-03 NOTE — PATIENT INSTRUCTIONS
Patient Education   Personalized Prevention Plan  You are due for the preventive services outlined below.  Your care team is available to assist you in scheduling these services.  If you have already completed any of these items, please share that information with your care team to update in your medical record.  Health Maintenance Due   Topic Date Due     ANNUAL REVIEW OF HM ORDERS  Never done     LUNG CANCER SCREENING  Never done     Kidney Microalbumin Urine Test  05/24/2014     Hemoglobin  01/30/2019     Colorectal Cancer Screening  03/02/2021     Basic Metabolic Panel  03/28/2021     Cholesterol Lab  09/28/2021     Your Health Risk Assessment indicates you feel you are not in good emotional health.    Recreation   Recreation is not limited to sports and team events. It includes any activity that provides relaxation, interest, enjoyment, and exercise. Recreation provides an outlet for physical, mental, and social energy. It can give a sense of worth and achievement. It can help you stay healthy.    Mental Exercise and Social Involvement  Mental and emotional health is as important as physical health. Keep in touch with friends and family. Stay as active as possible. Continue to learn and challenge yourself.   Things you can do to stay mentally active are:    Learn something new, like a foreign language or musical instrument.     Play SCRABBLE or do crossword puzzles. If you cannot find people to play these games with you at home, you can play them with others on your computer through the Internet.     Join a games club--anything from card games to chess or checkers or lawn bowling.     Start a new hobby.     Go back to school.     Volunteer.     Read.   Keep up with world events.

## 2022-02-04 LAB
ANION GAP SERPL CALCULATED.3IONS-SCNC: 6 MMOL/L (ref 3–14)
BUN SERPL-MCNC: 20 MG/DL (ref 7–30)
CALCIUM SERPL-MCNC: 9.2 MG/DL (ref 8.5–10.1)
CHLORIDE BLD-SCNC: 107 MMOL/L (ref 94–109)
CHOLEST SERPL-MCNC: 167 MG/DL
CO2 SERPL-SCNC: 27 MMOL/L (ref 20–32)
CREAT SERPL-MCNC: 1.06 MG/DL (ref 0.66–1.25)
FASTING STATUS PATIENT QL REPORTED: NORMAL
GFR SERPL CREATININE-BSD FRML MDRD: 74 ML/MIN/1.73M2
GLUCOSE BLD-MCNC: 87 MG/DL (ref 70–99)
HDLC SERPL-MCNC: 59 MG/DL
LDLC SERPL CALC-MCNC: 84 MG/DL
NONHDLC SERPL-MCNC: 108 MG/DL
POTASSIUM BLD-SCNC: 3.6 MMOL/L (ref 3.4–5.3)
SODIUM SERPL-SCNC: 140 MMOL/L (ref 133–144)
TRIGL SERPL-MCNC: 121 MG/DL
TSH SERPL DL<=0.005 MIU/L-ACNC: 1.64 MU/L (ref 0.4–4)

## 2022-02-05 LAB
CREAT UR-MCNC: 219 MG/DL
MICROALBUMIN UR-MCNC: 12 MG/L
MICROALBUMIN/CREAT UR: 5.48 MG/G CR (ref 0–17)

## 2022-06-28 ENCOUNTER — TRANSFERRED RECORDS (OUTPATIENT)
Dept: HEALTH INFORMATION MANAGEMENT | Facility: CLINIC | Age: 77
End: 2022-06-28

## 2022-07-27 DIAGNOSIS — M25.562 LEFT KNEE PAIN: Primary | ICD-10-CM

## 2022-07-28 ENCOUNTER — TRANSFERRED RECORDS (OUTPATIENT)
Dept: HEALTH INFORMATION MANAGEMENT | Facility: CLINIC | Age: 77
End: 2022-07-28

## 2022-07-28 NOTE — TELEPHONE ENCOUNTER
DIAGNOSIS: Consult (L) Knee replacement, Self Referral    APPOINTMENT DATE: 8/5/2022, 3:40 PM    NOTES STATUS DETAILS   OFFICE NOTE from other specialist Internal 4/6/16, 2/10/16, 1/20/16, 12/2/15 Office visit with Dr. Manuel Ponce ([facility])      DISCHARGE SUMMARY from hospital Internal 4/5/16, 3/15/16, 3/1/16 Therapy visit with Osmin Harvey PT ([facility]) - more therapy visits in EPIC     OPERATIVE REPORT Internal Right Total Knee Arthroplasty: 1/4/16   MEDICATION LIST Internal    LABS     CBC/DIFF Internal 1/30/18   XRAYS (IMAGES & REPORTS) Internal XR Knee Bilateral: 12/2/15

## 2022-08-05 ENCOUNTER — OFFICE VISIT (OUTPATIENT)
Dept: ORTHOPEDICS | Facility: CLINIC | Age: 77
End: 2022-08-05
Payer: COMMERCIAL

## 2022-08-05 ENCOUNTER — LAB (OUTPATIENT)
Dept: LAB | Facility: CLINIC | Age: 77
End: 2022-08-05
Payer: COMMERCIAL

## 2022-08-05 ENCOUNTER — PRE VISIT (OUTPATIENT)
Dept: ORTHOPEDICS | Facility: CLINIC | Age: 77
End: 2022-08-05

## 2022-08-05 VITALS — WEIGHT: 199 LBS | HEIGHT: 70 IN | BODY MASS INDEX: 28.49 KG/M2

## 2022-08-05 DIAGNOSIS — M25.562 CHRONIC PAIN OF LEFT KNEE: ICD-10-CM

## 2022-08-05 DIAGNOSIS — G89.29 CHRONIC PAIN OF LEFT KNEE: Primary | ICD-10-CM

## 2022-08-05 DIAGNOSIS — Z01.818 PRE-OP EVALUATION: ICD-10-CM

## 2022-08-05 DIAGNOSIS — M25.562 LEFT KNEE PAIN: ICD-10-CM

## 2022-08-05 DIAGNOSIS — M25.562 CHRONIC PAIN OF LEFT KNEE: Primary | ICD-10-CM

## 2022-08-05 DIAGNOSIS — Z86.39 PERSONAL HISTORY OF OTHER ENDOCRINE, NUTRITIONAL AND METABOLIC DISEASE: ICD-10-CM

## 2022-08-05 DIAGNOSIS — M81.0 AGE-RELATED OSTEOPOROSIS WITHOUT CURRENT PATHOLOGICAL FRACTURE: ICD-10-CM

## 2022-08-05 DIAGNOSIS — G89.29 CHRONIC PAIN OF LEFT KNEE: ICD-10-CM

## 2022-08-05 DIAGNOSIS — M17.10 ARTHRITIS OF KNEE: ICD-10-CM

## 2022-08-05 LAB
CRP SERPL-MCNC: <2.9 MG/L (ref 0–8)
D DIMER PPP FEU-MCNC: 0.51 UG/ML FEU (ref 0–0.5)
ERYTHROCYTE [SEDIMENTATION RATE] IN BLOOD BY WESTERGREN METHOD: 7 MM/HR (ref 0–20)
HBA1C MFR BLD: 5.4 % (ref 0–5.6)
URATE SERPL-MCNC: 5 MG/DL (ref 3.5–7.2)

## 2022-08-05 PROCEDURE — 86431 RHEUMATOID FACTOR QUANT: CPT | Performed by: ORTHOPAEDIC SURGERY

## 2022-08-05 PROCEDURE — 85379 FIBRIN DEGRADATION QUANT: CPT | Performed by: ORTHOPAEDIC SURGERY

## 2022-08-05 PROCEDURE — 86038 ANTINUCLEAR ANTIBODIES: CPT | Performed by: ORTHOPAEDIC SURGERY

## 2022-08-05 PROCEDURE — 82306 VITAMIN D 25 HYDROXY: CPT | Performed by: ORTHOPAEDIC SURGERY

## 2022-08-05 PROCEDURE — 83036 HEMOGLOBIN GLYCOSYLATED A1C: CPT | Performed by: PATHOLOGY

## 2022-08-05 PROCEDURE — 85652 RBC SED RATE AUTOMATED: CPT | Performed by: PATHOLOGY

## 2022-08-05 PROCEDURE — 99203 OFFICE O/P NEW LOW 30 MIN: CPT | Performed by: ORTHOPAEDIC SURGERY

## 2022-08-05 PROCEDURE — 86140 C-REACTIVE PROTEIN: CPT | Performed by: PATHOLOGY

## 2022-08-05 PROCEDURE — 86225 DNA ANTIBODY NATIVE: CPT | Performed by: ORTHOPAEDIC SURGERY

## 2022-08-05 PROCEDURE — 36415 COLL VENOUS BLD VENIPUNCTURE: CPT | Performed by: PATHOLOGY

## 2022-08-05 PROCEDURE — 84550 ASSAY OF BLOOD/URIC ACID: CPT | Performed by: PATHOLOGY

## 2022-08-05 PROCEDURE — 86200 CCP ANTIBODY: CPT | Performed by: ORTHOPAEDIC SURGERY

## 2022-08-05 NOTE — NURSING NOTE
Teaching Flowsheet   Relevant Diagnosis: L TKA  Teaching Topic: L TKA     RN Note: Pt is calm and cooperative, asking questions appropriately. Pt was instructed on pre-surgical packet requirements including H&P, COVID-19 test, NPO, and pre-surgical scrub. Pt verbalized understanding. Pt will schedule H&P c PCP, Dental clearance, COVID test 3-4 days before surgery, scrub and packet given to pt. Pt would like surgery sometime in mid-September. Lab work, DEXA, and compression stockings to be completed prior to surgery.    Person(s) involved in teaching:   Patient     Motivation Level:  Asks Questions: Yes  Eager to Learn: Yes  Cooperative: Yes  Receptive (willing/able to accept information): Yes  Any cultural factors/Zoroastrian beliefs that may influence understanding or compliance? No     Patient demonstrates understanding of the following:  Reason for the appointment, diagnosis and treatment plan: Yes  Knowledge of proper use of medications and conditions for which they are ordered (with special attention to potential side effects or drug interactions): Yes  Which situations necessitate calling provider and whom to contact: Yes    Proper use and care of (medical equip, care aids, etc.): Yes  Nutritional needs and diet plan: Yes  Pain management techniques: Yes  Wound Care: Yes  How and/when to access community resources: Yes    Claudio Maldonado RNCC

## 2022-08-05 NOTE — LETTER
8/5/2022       RE: Jerry Miguel  [address] [place] [address]    Dear Colleague,    Thank you for referring your patient, Jerry Miguel, to the [facility] [place]. Please see a copy of my visit note below.    S:Has had previous R knee arthroplasty which is doing quite well.  Now having similar left knee pain and disability.         Patient Active Problem List   Diagnosis     Osteoarthritis of knee     CARDIOVASCULAR SCREENING; LDL GOAL LESS THAN 130     Seasonal allergic rhinitis     Hyperlipidemia LDL goal <130     Benign non-nodular prostatic hyperplasia with lower urinary tract symptoms     GERD (gastroesophageal reflux disease)     Hyperthyroidism     Essential hypertension with goal blood pressure less than 140/90     Advanced care planning/counseling discussion     Right knee DJD     CKD (chronic kidney disease) stage 3, GFR 30-59 ml/min (H)     Seasonal depression (H)     Nuclear senile cataract of both eyes            Past Medical History:   Diagnosis Date     Arthritis     knee     BPH (benign prostatic hyperplasia)      Chronic prostatitis      Gastro-oesophageal reflux disease      Hypertension      Kidney stone      Shingles      Thyroid disease 2001-about    h/o hyperthyroid,treated, now normal            Past Surgical History:   Procedure Laterality Date     APPENDECTOMY       ARTHROPLASTY KNEE Right 1/4/2016    Procedure: ARTHROPLASTY KNEE;  Surgeon: Manuel Ponce MD;  [facility]     COLONOSCOPY       COLONOSCOPY WITH CO2 INSUFFLATION N/A 3/2/2018    Procedure: COLONOSCOPY WITH CO2 INSUFFLATION;  Colonoscopy, Constipation, unspecified constipation type Recent change in frequency of bowel movements, [place], BMI 31.52, [facility] [place];  Surgeon: Edith Craig MD;  Location: [facility] OR     ENT SURGERY      tonsillectomy     LASER HOLMIUM LITHOTRIPSY URETER(S), INSERT STENT, COMBINED  10/11/2012    Procedure: COMBINED CYSTOSCOPY,  URETEROSCOPY, LASER HOLMIUM LITHOTRIPSY URETER(S), INSERT STENT;  CYSTOSCOPY, ATTEMPTED RIGHT URETEROSCOPY, INSERT RIGHT URETERAL STENT, Laser [place];  Surgeon: Petar Ulrich MD;  [facility]            Social History     Tobacco Use     Smoking status: Former Smoker     Types: Cigarettes     Smokeless tobacco: Never Used   Substance Use Topics     Alcohol use: Yes     Alcohol/week: 0.0 standard drinks     Comment: 3x week            Family History   Problem Relation Age of Onset     Cancer Mother         ovarian     Cancer Father         lung     Heart Disease Father         possibly     Cancer Paternal Grandfather         esophageal cancer     Cancer Brother 68        prostate cancer     Diabetes Maternal Grandmother      Breast Cancer No family hx of      Cancer - colorectal No family hx of      Coronary Artery Disease No family hx of      Hypertension No family hx of      Hyperlipidemia No family hx of      Cerebrovascular Disease No family hx of      Colon Cancer No family hx of      Prostate Cancer No family hx of      Other Cancer No family hx of      Depression No family hx of      Anxiety Disorder No family hx of      Mental Illness No family hx of      Substance Abuse No family hx of      Anesthesia Reaction No family hx of      Asthma No family hx of      Osteoporosis No family hx of      Genetic Disorder No family hx of      Thyroid Disease No family hx of      Obesity No family hx of      Unknown/Adopted No family hx of      Macular Degeneration No family hx of      Glaucoma No family hx of                Allergies   Allergen Reactions     Codeine Sulfate Nausea and Vomiting            Current Outpatient Medications   Medication Sig Dispense Refill     DULoxetine (CYMBALTA) 30 MG capsule TAKE 1 CAPSULE BY MOUTH EVERY DAY 90 capsule 3     esomeprazole (NEXIUM) 40 MG DR capsule Take 30-60 minutes before eating. 90 capsule 3     finasteride (PROSCAR) 5 MG tablet Take 1 tablet (5 mg) by mouth  daily 90 tablet 3     fluticasone (VERAMYST) 27.5 MCG/SPRAY spray Spray 1 spray into both nostrils daily       ketoconazole (NIZORAL) 2 % external cream Apply daily to face 60 g 4     metroNIDAZOLE (METROCREAM) 0.75 % external cream Apply topically 2 times daily 45 g 1     rosuvastatin (CRESTOR) 10 MG tablet Take 1 tablet (10 mg) by mouth daily 90 tablet 3     triamcinolone (KENALOG) 0.1 % external cream Apply sparingly daily for one week. 15 g 4     valsartan (DIOVAN) 80 MG tablet Take 1 tablet (80 mg) by mouth daily 90 tablet 3     valsartan-hydrochlorothiazide (DIOVAN HCT) 320-25 MG tablet TAKE 1 TABLET DAILY (Patient not taking: Reported on 2/3/2022) 90 tablet 0     VITAMIN D, CHOLECALCIFEROL, PO Take 400 Units by mouth daily            Review Of Systems  Skin: negative  Eyes: negative  Ears/Nose/Throat: negative  Respiratory: No shortness of breath, dyspnea on exertion, cough, or hemoptysis    O: Physical Exam:  Pain to palpation in parapatellar fashion, more so medial aspect.   Medial joint line pain to palpation.  Slight effusion.  Adequate knee flexion and extension.  CMS intact.      Labs:  Obtain inflammatory labs and arthritic profile.  Hgb A1c needs update and 25 OH vit D    Images:     Findings:     AP, lateral and patellofemoral views of the left knee were obtained.      No acute osseous abnormality. Moderate joint effusion.     Moderate tricompartmental joint space loss, mildly progressed.     No patellar tilt or lateral subluxation.  Vascular calcifications.                                                                      Impression:  1. No acute osseous abnormality.  2. Moderate tricompartmental degenerative change.       A:  Tricompartmental arthropathy L knee, Stable R TKA    P:  Obtain labs  See back after studies.             In addition to the above assessment and plan each active problem on Jerry's problem list was evaluated today. This included the questioning of Jerry for any medication  problems. We will continue the current treatment plan for these active problems except as noted.      LILLY BOWMAN MD

## 2022-08-05 NOTE — NURSING NOTE
"Reason For Visit:   Chief Complaint   Patient presents with     Consult     Left knee pain, would like to discuss total knee replacement.       Ht 1.784 m (5' 10.25\")   Wt 90.3 kg (199 lb)   BMI 28.35 kg/m           Barber Serraon ATC  "

## 2022-08-08 LAB
ANA PAT SER IF-IMP: ABNORMAL
ANA SER QL IF: ABNORMAL
ANA TITR SER IF: ABNORMAL {TITER}
CCP AB SER IA-ACNC: 1.1 U/ML
DEPRECATED CALCIDIOL+CALCIFEROL SERPL-MC: 20 UG/L (ref 20–75)
DSDNA AB SER-ACNC: 1.4 IU/ML
RHEUMATOID FACT SER NEPH-ACNC: 7 IU/ML

## 2022-08-12 NOTE — PROGRESS NOTES
S:Has had previous R knee arthroplasty which is doing quite well.  Now having similar left knee pain and disability.         Patient Active Problem List   Diagnosis     Osteoarthritis of knee     CARDIOVASCULAR SCREENING; LDL GOAL LESS THAN 130     Seasonal allergic rhinitis     Hyperlipidemia LDL goal <130     Benign non-nodular prostatic hyperplasia with lower urinary tract symptoms     GERD (gastroesophageal reflux disease)     Hyperthyroidism     Essential hypertension with goal blood pressure less than 140/90     Advanced care planning/counseling discussion     Right knee DJD     CKD (chronic kidney disease) stage 3, GFR 30-59 ml/min (H)     Seasonal depression (H)     Nuclear senile cataract of both eyes            Past Medical History:   Diagnosis Date     Arthritis     knee     BPH (benign prostatic hyperplasia)      Chronic prostatitis      Gastro-oesophageal reflux disease      Hypertension      Kidney stone      Shingles      Thyroid disease 2001-about    h/o hyperthyroid,treated, now normal            Past Surgical History:   Procedure Laterality Date     APPENDECTOMY       ARTHROPLASTY KNEE Right 1/4/2016    Procedure: ARTHROPLASTY KNEE;  Surgeon: Manuel Ponce MD;  [facility]     COLONOSCOPY       COLONOSCOPY WITH CO2 INSUFFLATION N/A 3/2/2018    Procedure: COLONOSCOPY WITH CO2 INSUFFLATION;  Colonoscopy, Constipation, unspecified constipation type Recent change in frequency of bowel movements, [place], BMI 31.52, [facility];  Surgeon: Edith Craig MD;  Location: [facility]     ENT SURGERY      tonsillectomy     LASER HOLMIUM LITHOTRIPSY URETER(S), INSERT STENT, COMBINED  10/11/2012    Procedure: COMBINED CYSTOSCOPY, URETEROSCOPY, LASER HOLMIUM LITHOTRIPSY URETER(S), INSERT STENT;  CYSTOSCOPY, ATTEMPTED RIGHT URETEROSCOPY, INSERT RIGHT URETERAL STENT, Laser [place];  Surgeon: Petar Ulrich MD;  [facility]            Social History     Tobacco Use     Smoking status:  Former Smoker     Types: Cigarettes     Smokeless tobacco: Never Used   Substance Use Topics     Alcohol use: Yes     Alcohol/week: 0.0 standard drinks     Comment: 3x week            Family History   Problem Relation Age of Onset     Cancer Mother         ovarian     Cancer Father         lung     Heart Disease Father         possibly     Cancer Paternal Grandfather         esophageal cancer     Cancer Brother 68        prostate cancer     Diabetes Maternal Grandmother      Breast Cancer No family hx of      Cancer - colorectal No family hx of      Coronary Artery Disease No family hx of      Hypertension No family hx of      Hyperlipidemia No family hx of      Cerebrovascular Disease No family hx of      Colon Cancer No family hx of      Prostate Cancer No family hx of      Other Cancer No family hx of      Depression No family hx of      Anxiety Disorder No family hx of      Mental Illness No family hx of      Substance Abuse No family hx of      Anesthesia Reaction No family hx of      Asthma No family hx of      Osteoporosis No family hx of      Genetic Disorder No family hx of      Thyroid Disease No family hx of      Obesity No family hx of      Unknown/Adopted No family hx of      Macular Degeneration No family hx of      Glaucoma No family hx of                Allergies   Allergen Reactions     Codeine Sulfate Nausea and Vomiting            Current Outpatient Medications   Medication Sig Dispense Refill     DULoxetine (CYMBALTA) 30 MG capsule TAKE 1 CAPSULE BY MOUTH EVERY DAY 90 capsule 3     esomeprazole (NEXIUM) 40 MG DR capsule Take 30-60 minutes before eating. 90 capsule 3     finasteride (PROSCAR) 5 MG tablet Take 1 tablet (5 mg) by mouth daily 90 tablet 3     fluticasone (VERAMYST) 27.5 MCG/SPRAY spray Spray 1 spray into both nostrils daily       ketoconazole (NIZORAL) 2 % external cream Apply daily to face 60 g 4     metroNIDAZOLE (METROCREAM) 0.75 % external cream Apply topically 2 times daily 45 g  1     rosuvastatin (CRESTOR) 10 MG tablet Take 1 tablet (10 mg) by mouth daily 90 tablet 3     triamcinolone (KENALOG) 0.1 % external cream Apply sparingly daily for one week. 15 g 4     valsartan (DIOVAN) 80 MG tablet Take 1 tablet (80 mg) by mouth daily 90 tablet 3     valsartan-hydrochlorothiazide (DIOVAN HCT) 320-25 MG tablet TAKE 1 TABLET DAILY (Patient not taking: Reported on 2/3/2022) 90 tablet 0     VITAMIN D, CHOLECALCIFEROL, PO Take 400 Units by mouth daily            Review Of Systems  Skin: negative  Eyes: negative  Ears/Nose/Throat: negative  Respiratory: No shortness of breath, dyspnea on exertion, cough, or hemoptysis    O: Physical Exam:  Pain to palpation in parapatellar fashion, more so medial aspect.   Medial joint line pain to palpation.  Slight effusion.  Adequate knee flexion and extension.  CMS intact.      Labs:  Obtain inflammatory labs and arthritic profile.  Hgb A1c needs update and 25 OH vit D    Images:     Findings:     AP, lateral and patellofemoral views of the left knee were obtained.      No acute osseous abnormality. Moderate joint effusion.     Moderate tricompartmental joint space loss, mildly progressed.     No patellar tilt or lateral subluxation.  Vascular calcifications.                                                                        Impression:  1. No acute osseous abnormality.  2. Moderate tricompartmental degenerative change.         A:  Tricompartmental arthropathy L knee, Stable R TKA    P:  Obtain labs  See back after studies.             In addition to the above assessment and plan each active problem on Jerry's problem list was evaluated today. This included the questioning of Jerry for any medication problems. We will continue the current treatment plan for these active problems except as noted.

## 2022-08-18 ENCOUNTER — TELEPHONE (OUTPATIENT)
Dept: ORTHOPEDICS | Facility: CLINIC | Age: 77
End: 2022-08-18

## 2022-08-18 NOTE — TELEPHONE ENCOUNTER
This writer contacted patient and placed a tentative hold for surgery on 9/15. This writer informed patient I will call him when a case request is placed. Patient expressed understanding.

## 2022-08-19 RX ORDER — CEFAZOLIN SODIUM 2 G/50ML
2 SOLUTION INTRAVENOUS SEE ADMIN INSTRUCTIONS
Status: CANCELLED | OUTPATIENT
Start: 2022-08-19

## 2022-08-19 RX ORDER — CEFAZOLIN SODIUM 2 G/50ML
2 SOLUTION INTRAVENOUS
Status: CANCELLED | OUTPATIENT
Start: 2022-08-19

## 2022-08-19 RX ORDER — TRANEXAMIC ACID 650 MG/1
1950 TABLET ORAL ONCE
Status: CANCELLED | OUTPATIENT
Start: 2022-08-19 | End: 2022-08-19

## 2022-08-30 ENCOUNTER — OFFICE VISIT (OUTPATIENT)
Dept: FAMILY MEDICINE | Facility: CLINIC | Age: 77
End: 2022-08-30
Payer: COMMERCIAL

## 2022-08-30 VITALS
RESPIRATION RATE: 20 BRPM | BODY MASS INDEX: 28.2 KG/M2 | TEMPERATURE: 97.4 F | OXYGEN SATURATION: 100 % | SYSTOLIC BLOOD PRESSURE: 126 MMHG | WEIGHT: 197 LBS | HEART RATE: 81 BPM | DIASTOLIC BLOOD PRESSURE: 76 MMHG | HEIGHT: 70 IN

## 2022-08-30 DIAGNOSIS — M17.12 PRIMARY OSTEOARTHRITIS OF LEFT KNEE: ICD-10-CM

## 2022-08-30 DIAGNOSIS — Z01.818 PREOP GENERAL PHYSICAL EXAM: Primary | ICD-10-CM

## 2022-08-30 LAB — HGB BLD-MCNC: 14.5 G/DL (ref 13.3–17.7)

## 2022-08-30 PROCEDURE — 80048 BASIC METABOLIC PNL TOTAL CA: CPT | Performed by: FAMILY MEDICINE

## 2022-08-30 PROCEDURE — 36415 COLL VENOUS BLD VENIPUNCTURE: CPT | Performed by: FAMILY MEDICINE

## 2022-08-30 PROCEDURE — 99214 OFFICE O/P EST MOD 30 MIN: CPT | Performed by: FAMILY MEDICINE

## 2022-08-30 PROCEDURE — 85018 HEMOGLOBIN: CPT | Performed by: FAMILY MEDICINE

## 2022-08-30 PROCEDURE — 93000 ELECTROCARDIOGRAM COMPLETE: CPT | Performed by: FAMILY MEDICINE

## 2022-08-30 ASSESSMENT — PAIN SCALES - GENERAL: PAINLEVEL: NO PAIN (0)

## 2022-08-30 NOTE — PATIENT INSTRUCTIONS
Preparing for Your Surgery  Getting started  A nurse will call you to review your health history and instructions. They will give you an arrival time based on your scheduled surgery time. Please be ready to share:    Your doctor's clinic name and phone number    Your medical, surgical and anesthesia history    A list of allergies and sensitivities    A list of medicines, including herbal treatments and over-the-counter drugs    Whether the patient has a legal guardian (ask how to send us the papers in advance)  Please tell us if you're pregnant--or if there's any chance you might be pregnant. Some surgeries may injure a fetus (unborn baby), so they require a pregnancy test. Surgeries that are safe for a fetus don't always need a test, and you can choose whether to have one.   If you have a child who's having surgery, please ask for a copy of Preparing for Your Child's Surgery.    Preparing for surgery    Within 30 days of surgery: Have a pre-op exam (sometimes called an H&P, or History and Physical). This can be done at a clinic or pre-operative center.  ? If you're having a , you may not need this exam. Talk to your care team.    At your pre-op exam, talk to your care team about all medicines you take. If you need to stop any medicines before surgery, ask when to start taking them again.  ? We do this for your safety. Many medicines can make you bleed too much during surgery. Some change how well surgery (anesthesia) drugs work.    Call your insurance company to let them know you're having surgery. (If you don't have insurance, call [phone].)    Call your clinic if there's any change in your health. This includes signs of a cold or flu (sore throat, runny nose, cough, rash, fever). It also includes a scrape or scratch near the surgery site.    If you have questions on the day of surgery, call your hospital or surgery center.  COVID testing  You may need to be tested for COVID-19 before having  surgery. If so, we will give you instructions.  Eating and drinking guidelines  For your safety: Unless your surgeon tells you otherwise, follow the guidelines below.    Eat and drink as usual until 8 hours before surgery. After that, no food or milk.    Drink clear liquids until 2 hours before surgery. These are liquids you can see through, like water, Gatorade and Propel Water. You may also have black coffee and tea (no cream or milk).    Nothing by mouth within 2 hours of surgery. This includes gum, candy and breath mints.    If you drink alcohol: Stop drinking it the night before surgery.    If your care team tells you to take medicine on the morning of surgery, it's okay to take it with a sip of water.  Preventing infection    Shower or bathe the night before and morning of your surgery. Follow the instructions your clinic gave you. (If no instructions, use regular soap.)    Don't shave or clip hair near your surgery site. We'll remove the hair if needed.    Don't smoke or vape the morning of surgery. You may chew nicotine gum up to 2 hours before surgery. A nicotine patch is okay.  ? Note: Some surgeries require you to completely quit smoking and nicotine. Check with your surgeon.    Your care team will make every effort to keep you safe from infection. We will:  ? Clean our hands often with soap and water (or an alcohol-based hand rub).  ? Clean the skin at your surgery site with a special soap that kills germs.  ? Give you a special gown to keep you warm. (Cold raises the risk of infection.)  ? Wear special hair covers, masks, gowns and gloves during surgery.  ? Give antibiotic medicine, if prescribed. Not all surgeries need antibiotics.  What to bring on the day of surgery    Photo ID and insurance card    Copy of your health care directive, if you have one    Glasses and hearing aides (bring cases)  ? You can't wear contacts during surgery    Inhaler and eye drops, if you use them (tell us about these when  you arrive)    CPAP machine or breathing device, if you use them    A few personal items, if spending the night    If you have . . .  ? A pacemaker, ICD (cardiac defibrillator) or other implant: Bring the ID card.  ? An implanted stimulator: Bring the remote control.  ? A legal guardian: Bring a copy of the certified (court-stamped) guardianship papers.  Please remove any jewelry, including body piercings. Leave jewelry and other valuables at home.  If you're going home the day of surgery    You must have a responsible adult drive you home. They should stay with you overnight as well.    If you don't have someone to stay with you, and you aren't safe to go home alone, we may keep you overnight. Insurance often won't pay for this.  After surgery  If it's hard to control your pain or you need more pain medicine, please call your surgeon's office.  Questions?   If you have any questions for your care team, list them here: _________________________________________________________________________________________________________________________________________________________________________ ____________________________________ ____________________________________ ____________________________________  For informational purposes only. Not to replace the advice of your health care provider. Copyright   2003, 2019 [facility]. All rights reserved. Clinically reviewed by Amara Fabian MD. [employer] 451932 - REV 07/21.

## 2022-08-30 NOTE — PROGRESS NOTES
[address] [address]  Phone: [phone]  Primary Provider: Amaury Lyon  Pre-op Performing Provider: AMAURY LYON      PREOPERATIVE EVALUATION:  Today's date: 8/30/2022    Jerry Miguel is a 77 year old male who presents for a preoperative evaluation.    Surgical Information:  Surgery/Procedure: ARTHROPLASTY, KNEE, TOTAL LEFT  [facility]   Surgeon: Jennifer  Surgery Date: 9/15/22  Time of Surgery: 10:45 AM  Where patient plans to recover: At home with family  Fax number for surgical facility: Note does not need to be faxed, will be available electronically in Epic.    Type of Anesthesia Anticipated: choice with block    Assessment & Plan     The proposed surgical procedure is considered INTERMEDIATE risk.    Preop general physical exam    - Hemoglobin; Future  - BASIC METABOLIC PANEL; Future  - EKG 12-lead complete w/read - Clinics  - Hemoglobin  - BASIC METABOLIC PANEL    Primary osteoarthritis of left knee         Risks and Recommendations:  The patient has the following additional risks and recommendations for perioperative complications:   - No identified additional risk factors other than previously addressed    Medication Instructions:  Patient is to take all scheduled medications on the day of surgery    RECOMMENDATION:  APPROVAL GIVEN to proceed with proposed procedure, without further diagnostic evaluation.        Subjective     HPI related to upcoming procedure: R knee done previously, left knee worsening arthritis    Preop Questions 8/23/2022   1. Have you ever had a heart attack or stroke? No   2. Have you ever had surgery on your heart or blood vessels, such as a stent placement, a coronary artery bypass, or surgery on an artery in your head, neck, heart, or legs? No   3. Do you have chest pain with activity? No   4. Do you have a history of  heart failure? No   5. Do you currently have a cold,  bronchitis or symptoms of other infection? No   6. Do you have a cough, shortness of breath, or wheezing? No   7. Do you or anyone in your family have previous history of blood clots? No   8. Do you or does anyone in your family have a serious bleeding problem such as prolonged bleeding following surgeries or cuts? No   9. Have you ever had problems with anemia or been told to take iron pills? No   10. Have you had any abnormal blood loss such as black, tarry or bloody stools? No   11. Have you ever had a blood transfusion? No   12. Are you willing to have a blood transfusion if it is medically needed before, during, or after your surgery? Yes   13. Have you or any of your relatives ever had problems with anesthesia? No   14. Do you have sleep apnea, excessive snoring or daytime drowsiness? UNKNOWN    15. Do you have any artifical heart valves or other implanted medical devices like a pacemaker, defibrillator, or continuous glucose monitor? No   16. Do you have artificial joints? YES - R knee   17. Are you allergic to latex? No       Health Care Directive:  Patient does not have a Health Care Directive or Living Will:     Preoperative Review of :   reviewed - no record of controlled substances prescribed.      Status of Chronic Conditions:  See problem list for active medical problems.  Problems all longstanding and stable, except as noted/documented.  See ROS for pertinent symptoms related to these conditions.      Review of Systems  10 point ROS of systems including Constitutional, Eyes, Respiratory, Cardiovascular, Gastroenterology, Genitourinary, Integumentary, Muscularskeletal, Psychiatric were all negative except for pertinent positives noted in my HPI.      Patient Active Problem List    Diagnosis Date Noted     Nuclear senile cataract of both eyes 12/13/2019     Priority: Medium     CKD (chronic kidney disease) stage 3, GFR 30-59 ml/min (H) 09/19/2019     Priority: Medium     Seasonal depression (H)  09/19/2019     Priority: Medium     Right knee DJD 01/04/2016     Priority: Medium     Advanced care planning/counseling discussion 12/14/2012     Priority: Medium     Essential hypertension with goal blood pressure less than 140/90 07/06/2012     Priority: Medium     Osteoarthritis of knee 06/08/2012     Priority: Medium     CARDIOVASCULAR SCREENING; LDL GOAL LESS THAN 130 06/08/2012     Priority: Medium     Seasonal allergic rhinitis 06/08/2012     Priority: Medium     Hyperlipidemia LDL goal <130 06/08/2012     Priority: Medium     Benign non-nodular prostatic hyperplasia with lower urinary tract symptoms 06/08/2012     Priority: Medium     GERD (gastroesophageal reflux disease) 06/08/2012     Priority: Medium     Hyperthyroidism 06/08/2012     Priority: Medium      Past Medical History:   Diagnosis Date     Arthritis     knee     BPH (benign prostatic hyperplasia)      Chronic prostatitis      Gastro-oesophageal reflux disease      Hypertension      Kidney stone      Shingles      Thyroid disease 2001-about    h/o hyperthyroid,treated, now normal     Past Surgical History:   Procedure Laterality Date     APPENDECTOMY       ARTHROPLASTY KNEE Right 1/4/2016    Procedure: ARTHROPLASTY KNEE;  Surgeon: Manuel Ponce MD;  [facility]     COLONOSCOPY       COLONOSCOPY WITH CO2 INSUFFLATION N/A 3/2/2018    Procedure: COLONOSCOPY WITH CO2 INSUFFLATION;  Colonoscopy, Constipation, unspecified constipation type Recent change in frequency of bowel movements, [place], BMI 31.52, [facility] [place];  Surgeon: Edith Craig MD;  [facility]     ENT SURGERY      tonsillectomy     LASER HOLMIUM LITHOTRIPSY URETER(S), INSERT STENT, COMBINED  10/11/2012    Procedure: COMBINED CYSTOSCOPY, URETEROSCOPY, LASER HOLMIUM LITHOTRIPSY URETER(S), INSERT STENT;  CYSTOSCOPY, ATTEMPTED RIGHT URETEROSCOPY, INSERT RIGHT URETERAL STENT, Laser [place];  Surgeon: Petar Ulrich MD;  [facility]     Current  "Outpatient Medications   Medication Sig Dispense Refill     DULoxetine (CYMBALTA) 30 MG capsule TAKE 1 CAPSULE BY MOUTH EVERY DAY 90 capsule 3     esomeprazole (NEXIUM) 40 MG DR capsule Take 30-60 minutes before eating. 90 capsule 3     finasteride (PROSCAR) 5 MG tablet Take 1 tablet (5 mg) by mouth daily 90 tablet 3     fluticasone (VERAMYST) 27.5 MCG/SPRAY spray Spray 1 spray into both nostrils daily       ketoconazole (NIZORAL) 2 % external cream Apply daily to face 60 g 4     metroNIDAZOLE (METROCREAM) 0.75 % external cream Apply topically 2 times daily 45 g 1     rosuvastatin (CRESTOR) 10 MG tablet Take 1 tablet (10 mg) by mouth daily 90 tablet 3     triamcinolone (KENALOG) 0.1 % external cream Apply sparingly daily for one week. 15 g 4     UNABLE TO FIND MEDICATION NAME: Herbal Digestive for IBS       valsartan (DIOVAN) 80 MG tablet Take 1 tablet (80 mg) by mouth daily 90 tablet 3     VITAMIN D, CHOLECALCIFEROL, PO Take 400 Units by mouth daily       valsartan-hydrochlorothiazide (DIOVAN HCT) 320-25 MG tablet TAKE 1 TABLET DAILY (Patient not taking: Reported on 2/3/2022) 90 tablet 0       Allergies   Allergen Reactions     Codeine Sulfate Nausea and Vomiting        Social History     Tobacco Use     Smoking status: Former Smoker     Packs/day: 1.00     Years: 10.00     Pack years: 10.00     Types: Cigarettes     Quit date: 1973     Years since quittin.6     Smokeless tobacco: Never Used   Substance Use Topics     Alcohol use: Not Currently     Comment: 3x week       History   Drug Use No         Objective     /76 (BP Location: Right arm, Patient Position: Chair, Cuff Size: Adult Regular)   Pulse 81   Temp 97.4  F (36.3  C) (Tympanic)   Resp 20   Ht 1.784 m (5' 10.25\")   Wt 89.4 kg (197 lb)   SpO2 100%   BMI 28.07 kg/m      Physical Exam    General Appearance: Pleasant, alert, in no acute respiratory distress.  Head Exam: Normal. Normocephalic, atraumatic. No sinus tenderness.  Eye Exam: " Normal external eye, conjunctiva, lids. ZHAO.  Ear Exam: Normal auditory canals and external ears. Non-tender.  Left TM-normal. Right TM-normal.  Neck Exam: Supple, no obvious thyroid enlargement  Chest/Respiratory Exam: Normal, comfortable, easy respirations. Chest wall normal. Lungs are clear to auscultation. No wheezing, crackles, or rhonchi.  Cardiovascular Exam: Regular rate and rhythm. No murmur, gallop, or rubs.  Musculoskeletal Exam: Back is non-tender, full ROM of upper and lower extremities.  Skin: no rash, warm and dry.    Neurologic Exam: Nonfocal, no tremor. Normal gait.  Psychiatric Exam: Alert - appropriate, normal affect      Recent Labs   Lab Test 08/05/22  1702 02/03/22  1542 09/28/20  0916   NA  --  140 136   POTASSIUM  --  3.6 3.2*   CR  --  1.06 1.16   A1C 5.4  --   --       Results for orders placed or performed in visit on 08/30/22   Hemoglobin     Status: Normal   Result Value Ref Range    Hemoglobin 14.5 13.3 - 17.7 g/dL   BASIC METABOLIC PANEL     Status: Normal   Result Value Ref Range    Sodium 141 133 - 144 mmol/L    Potassium 4.1 3.4 - 5.3 mmol/L    Chloride 105 94 - 109 mmol/L    Carbon Dioxide (CO2) 29 20 - 32 mmol/L    Anion Gap 7 3 - 14 mmol/L    Urea Nitrogen 20 7 - 30 mg/dL    Creatinine 1.12 0.66 - 1.25 mg/dL    Calcium 9.0 8.5 - 10.1 mg/dL    Glucose 91 70 - 99 mg/dL    GFR Estimate 69 >60 mL/min/1.73m2       Diagnostics:  Labs pending at this time.  Results will be reviewed when available.   EKG: appears normal, NSR, normal axis, normal intervals, no acute ST/T changes c/w ischemia, no LVH by voltage criteria, unchanged from previous tracings    Revised Cardiac Risk Index (RCRI):  The patient has the following serious cardiovascular risks for perioperative complications:   - No serious cardiac risks = 0 points     RCRI Interpretation: 0 points: Class I (very low risk - 0.4% complication rate)           Signed Electronically by: Amaury Lyon MD  Copy of this  evaluation report is provided to requesting physician.

## 2022-08-31 LAB
ANION GAP SERPL CALCULATED.3IONS-SCNC: 7 MMOL/L (ref 3–14)
BUN SERPL-MCNC: 20 MG/DL (ref 7–30)
CALCIUM SERPL-MCNC: 9 MG/DL (ref 8.5–10.1)
CHLORIDE BLD-SCNC: 105 MMOL/L (ref 94–109)
CO2 SERPL-SCNC: 29 MMOL/L (ref 20–32)
CREAT SERPL-MCNC: 1.12 MG/DL (ref 0.66–1.25)
GFR SERPL CREATININE-BSD FRML MDRD: 69 ML/MIN/1.73M2
GLUCOSE BLD-MCNC: 91 MG/DL (ref 70–99)
POTASSIUM BLD-SCNC: 4.1 MMOL/L (ref 3.4–5.3)
SODIUM SERPL-SCNC: 141 MMOL/L (ref 133–144)

## 2022-09-02 ENCOUNTER — OFFICE VISIT (OUTPATIENT)
Dept: ORTHOPEDICS | Facility: CLINIC | Age: 77
End: 2022-09-02
Payer: COMMERCIAL

## 2022-09-02 DIAGNOSIS — E55.9 HYPOVITAMINOSIS D: Primary | ICD-10-CM

## 2022-09-02 DIAGNOSIS — Z47.89 ORTHOPEDIC AFTERCARE: ICD-10-CM

## 2022-09-02 DIAGNOSIS — Z96.652 HISTORY OF TOTAL LEFT KNEE REPLACEMENT: ICD-10-CM

## 2022-09-02 PROCEDURE — 99214 OFFICE O/P EST MOD 30 MIN: CPT | Mod: GC | Performed by: ORTHOPAEDIC SURGERY

## 2022-09-02 NOTE — PROGRESS NOTES
Orthopaedic Surgery    S: Jerry Miguel is a 77 year-old male patient who returns to the clinic to review his inflammatory and arthritic labs prior to left TKA scheduled on 9/15/22.  He is doing well with no changes to his knee or health.          Patient Active Problem List   Diagnosis     Osteoarthritis of knee     CARDIOVASCULAR SCREENING; LDL GOAL LESS THAN 130     Seasonal allergic rhinitis     Hyperlipidemia LDL goal <130     Benign non-nodular prostatic hyperplasia with lower urinary tract symptoms     GERD (gastroesophageal reflux disease)     Hyperthyroidism     Essential hypertension with goal blood pressure less than 140/90     Advanced care planning/counseling discussion     Right knee DJD     CKD (chronic kidney disease) stage 3, GFR 30-59 ml/min (H)     Seasonal depression (H)     Nuclear senile cataract of both eyes            Past Medical History:   Diagnosis Date     Arthritis     knee     BPH (benign prostatic hyperplasia)      Chronic prostatitis      Gastro-oesophageal reflux disease      Hypertension      Kidney stone      Shingles      Thyroid disease 2001-about    h/o hyperthyroid,treated, now normal            Past Surgical History:   Procedure Laterality Date     APPENDECTOMY       ARTHROPLASTY KNEE Right 1/4/2016    Procedure: ARTHROPLASTY KNEE;  Surgeon: Manuel Ponce MD;  [facility]     COLONOSCOPY       COLONOSCOPY WITH CO2 INSUFFLATION N/A 3/2/2018    Procedure: COLONOSCOPY WITH CO2 INSUFFLATION;  Colonoscopy, Constipation, unspecified constipation type Recent change in frequency of bowel movements, [place], BMI 31.52, [facility];  Surgeon: Edith Craig MD;  Location: [facility] OR     ENT SURGERY      tonsillectomy     LASER HOLMIUM LITHOTRIPSY URETER(S), INSERT STENT, COMBINED  10/11/2012    Procedure: COMBINED CYSTOSCOPY, URETEROSCOPY, LASER HOLMIUM LITHOTRIPSY URETER(S), INSERT STENT;  CYSTOSCOPY, ATTEMPTED RIGHT URETEROSCOPY, INSERT RIGHT URETERAL STENT,  Laser [place];  Surgeon: Petar Ulrich MD;  Location:  OR            Social History     Tobacco Use     Smoking status: Former Smoker     Packs/day: 1.00     Years: 10.00     Pack years: 10.00     Types: Cigarettes     Quit date: 1973     Years since quittin.6     Smokeless tobacco: Never Used   Substance Use Topics     Alcohol use: Not Currently     Comment: 3x week            Family History   Problem Relation Age of Onset     Cancer Mother         ovarian     Cancer Father         lung     Heart Disease Father         possibly     Cancer Paternal Grandfather         esophageal cancer     Cancer Brother 68        prostate cancer     Diabetes Maternal Grandmother      Breast Cancer No family hx of      Cancer - colorectal No family hx of      Coronary Artery Disease No family hx of      Hypertension No family hx of      Hyperlipidemia No family hx of      Cerebrovascular Disease No family hx of      Colon Cancer No family hx of      Prostate Cancer No family hx of      Other Cancer No family hx of      Depression No family hx of      Anxiety Disorder No family hx of      Mental Illness No family hx of      Substance Abuse No family hx of      Anesthesia Reaction No family hx of      Asthma No family hx of      Osteoporosis No family hx of      Genetic Disorder No family hx of      Thyroid Disease No family hx of      Obesity No family hx of      Unknown/Adopted No family hx of      Macular Degeneration No family hx of      Glaucoma No family hx of                Allergies   Allergen Reactions     Codeine Sulfate Nausea and Vomiting            Current Outpatient Medications   Medication Sig Dispense Refill     DULoxetine (CYMBALTA) 30 MG capsule TAKE 1 CAPSULE BY MOUTH EVERY DAY 90 capsule 3     esomeprazole (NEXIUM) 40 MG DR capsule Take 30-60 minutes before eating. 90 capsule 3     finasteride (PROSCAR) 5 MG tablet Take 1 tablet (5 mg) by mouth daily 90 tablet 3     fluticasone (VERAMYST) 27.5  MCG/SPRAY spray Spray 1 spray into both nostrils daily       ketoconazole (NIZORAL) 2 % external cream Apply daily to face 60 g 4     metroNIDAZOLE (METROCREAM) 0.75 % external cream Apply topically 2 times daily 45 g 1     rosuvastatin (CRESTOR) 10 MG tablet Take 1 tablet (10 mg) by mouth daily 90 tablet 3     triamcinolone (KENALOG) 0.1 % external cream Apply sparingly daily for one week. 15 g 4     UNABLE TO FIND MEDICATION NAME: Herbal Digestive for IBS       valsartan (DIOVAN) 80 MG tablet Take 1 tablet (80 mg) by mouth daily 90 tablet 3     VITAMIN D, CHOLECALCIFEROL, PO Take 400 Units by mouth daily         Review Of Systems  Skin: negative  Eyes: negative  Respiratory: No shortness of breath, dyspnea on exertion, cough, or hemoptysis    O:   Left Knee Exam: tender to palpation of medial joint line. Adequate knee flexion and extension, varus orientation along the longitudinal axis.      Erythrocyte Sedimentation Rate   Date Value Ref Range Status   08/05/2022 7 0 - 20 mm/hr Final     CRP Inflammation   Date Value Ref Range Status   08/05/2022 <2.9 0.0 - 8.0 mg/L Final     Cyclic Citrullinated Peptide Antibody IgG   Date Value Ref Range Status   08/05/2022 1.1 <7.0 U/mL Final     Comment:     Negative     Rheumatoid Factor   Date Value Ref Range Status   08/05/2022 7 <12 IU/mL Final     Images:  Findings:     AP, lateral and patellofemoral views of the left knee were obtained.      No acute osseous abnormality. Moderate joint effusion.     Moderate tricompartmental joint space loss, mildly progressed.     No patellar tilt or lateral subluxation.  Vascular calcifications.                                                                        Impression:  1. No acute osseous abnormality.  2. Moderate tricompartmental degenerative change.       A:  Tricompartmental arthropathy L knee    P:  Plan for L TKA on 9/15/22  Patient and I discussed surgical intervention/procedure, possible risks/benefits, postop rehab,  wound care, anticoagulation/PE prophylaxis, motion postop.  See back at time of procedure.           In addition to the above assessment and plan each active problem on Jerry's problem list was evaluated today. This included the questioning of Jerry for any medication problems. We will continue the current treatment plan for these active problems except as noted.    The patient was seen and evaluated with Dr. Rodriguez today.     I evaluated patient, was present entire visit and concur with evaluation and treatment plan.    Jesus Rodriguez MD

## 2022-09-02 NOTE — NURSING NOTE
Reason For Visit:   Chief Complaint   Patient presents with     RECHECK     Pre pro -- dos 9/15/22        There were no vitals taken for this visit.    Pain Assessment  Patient Currently in Pain: Yes  Patient's Stated Pain Goal: 4    [address] [place]

## 2022-09-02 NOTE — LETTER
9/2/2022         RE: Jerry Miguel  [address] [place] [address]        Dear Colleague,    Thank you for referring your patient, Jerry Miguel, to the [facility] [place]. Please see a copy of my visit note below.      Orthopaedic Surgery    S: Jerry Miguel is a 77 year-old male patient who returns to the clinic to review his inflammatory and arthritic labs prior to left TKA scheduled on 9/15/22.  He is doing well with no changes to his knee or health.          Patient Active Problem List   Diagnosis     Osteoarthritis of knee     CARDIOVASCULAR SCREENING; LDL GOAL LESS THAN 130     Seasonal allergic rhinitis     Hyperlipidemia LDL goal <130     Benign non-nodular prostatic hyperplasia with lower urinary tract symptoms     GERD (gastroesophageal reflux disease)     Hyperthyroidism     Essential hypertension with goal blood pressure less than 140/90     Advanced care planning/counseling discussion     Right knee DJD     CKD (chronic kidney disease) stage 3, GFR 30-59 ml/min (H)     Seasonal depression (H)     Nuclear senile cataract of both eyes            Past Medical History:   Diagnosis Date     Arthritis     knee     BPH (benign prostatic hyperplasia)      Chronic prostatitis      Gastro-oesophageal reflux disease      Hypertension      Kidney stone      Shingles      Thyroid disease 2001-about    h/o hyperthyroid,treated, now normal            Past Surgical History:   Procedure Laterality Date     APPENDECTOMY       ARTHROPLASTY KNEE Right 1/4/2016    Procedure: ARTHROPLASTY KNEE;  Surgeon: Manuel Ponce MD;  [facility]     COLONOSCOPY       COLONOSCOPY WITH CO2 INSUFFLATION N/A 3/2/2018    Procedure: COLONOSCOPY WITH CO2 INSUFFLATION;  Colonoscopy, Constipation, unspecified constipation type Recent change in frequency of bowel movements, [place], BMI 31.52, [facility];  Surgeon: Edith Craig MD;  Location: [facility]     ENT SURGERY       tonsillectomy     LASER HOLMIUM LITHOTRIPSY URETER(S), INSERT STENT, COMBINED  10/11/2012    Procedure: COMBINED CYSTOSCOPY, URETEROSCOPY, LASER HOLMIUM LITHOTRIPSY URETER(S), INSERT STENT;  CYSTOSCOPY, ATTEMPTED RIGHT URETEROSCOPY, INSERT RIGHT URETERAL STENT, Laser [place];  Surgeon: Petar Ulrich MD;  Location:  OR            Social History     Tobacco Use     Smoking status: Former Smoker     Packs/day: 1.00     Years: 10.00     Pack years: 10.00     Types: Cigarettes     Quit date: 1973     Years since quittin.6     Smokeless tobacco: Never Used   Substance Use Topics     Alcohol use: Not Currently     Comment: 3x week            Family History   Problem Relation Age of Onset     Cancer Mother         ovarian     Cancer Father         lung     Heart Disease Father         possibly     Cancer Paternal Grandfather         esophageal cancer     Cancer Brother 68        prostate cancer     Diabetes Maternal Grandmother      Breast Cancer No family hx of      Cancer - colorectal No family hx of      Coronary Artery Disease No family hx of      Hypertension No family hx of      Hyperlipidemia No family hx of      Cerebrovascular Disease No family hx of      Colon Cancer No family hx of      Prostate Cancer No family hx of      Other Cancer No family hx of      Depression No family hx of      Anxiety Disorder No family hx of      Mental Illness No family hx of      Substance Abuse No family hx of      Anesthesia Reaction No family hx of      Asthma No family hx of      Osteoporosis No family hx of      Genetic Disorder No family hx of      Thyroid Disease No family hx of      Obesity No family hx of      Unknown/Adopted No family hx of      Macular Degeneration No family hx of      Glaucoma No family hx of                Allergies   Allergen Reactions     Codeine Sulfate Nausea and Vomiting            Current Outpatient Medications   Medication Sig Dispense Refill     DULoxetine (CYMBALTA) 30 MG  capsule TAKE 1 CAPSULE BY MOUTH EVERY DAY 90 capsule 3     esomeprazole (NEXIUM) 40 MG DR capsule Take 30-60 minutes before eating. 90 capsule 3     finasteride (PROSCAR) 5 MG tablet Take 1 tablet (5 mg) by mouth daily 90 tablet 3     fluticasone (VERAMYST) 27.5 MCG/SPRAY spray Spray 1 spray into both nostrils daily       ketoconazole (NIZORAL) 2 % external cream Apply daily to face 60 g 4     metroNIDAZOLE (METROCREAM) 0.75 % external cream Apply topically 2 times daily 45 g 1     rosuvastatin (CRESTOR) 10 MG tablet Take 1 tablet (10 mg) by mouth daily 90 tablet 3     triamcinolone (KENALOG) 0.1 % external cream Apply sparingly daily for one week. 15 g 4     UNABLE TO FIND MEDICATION NAME: Herbal Digestive for IBS       valsartan (DIOVAN) 80 MG tablet Take 1 tablet (80 mg) by mouth daily 90 tablet 3     VITAMIN D, CHOLECALCIFEROL, PO Take 400 Units by mouth daily         Review Of Systems  Skin: negative  Eyes: negative  Respiratory: No shortness of breath, dyspnea on exertion, cough, or hemoptysis    O:   Left Knee Exam: tender to palpation of medial joint line. Adequate knee flexion and extension, varus orientation along the longitudinal axis.      Erythrocyte Sedimentation Rate   Date Value Ref Range Status   08/05/2022 7 0 - 20 mm/hr Final     CRP Inflammation   Date Value Ref Range Status   08/05/2022 <2.9 0.0 - 8.0 mg/L Final     Cyclic Citrullinated Peptide Antibody IgG   Date Value Ref Range Status   08/05/2022 1.1 <7.0 U/mL Final     Comment:     Negative     Rheumatoid Factor   Date Value Ref Range Status   08/05/2022 7 <12 IU/mL Final     Images:  Findings:     AP, lateral and patellofemoral views of the left knee were obtained.      No acute osseous abnormality. Moderate joint effusion.     Moderate tricompartmental joint space loss, mildly progressed.     No patellar tilt or lateral subluxation.  Vascular calcifications.                                                                         Impression:  1. No acute osseous abnormality.  2. Moderate tricompartmental degenerative change.       A:  Tricompartmental arthropathy L knee    P:  Plan for L TKA on 9/15/22  Patient and I discussed surgical intervention/procedure, possible risks/benefits, postop rehab, wound care, anticoagulation/PE prophylaxis, motion postop.  See back at time of procedure.           In addition to the above assessment and plan each active problem on Jerry's problem list was evaluated today. This included the questioning of Jerry for any medication problems. We will continue the current treatment plan for these active problems except as noted.    The patient was seen and evaluated with Dr. Rodriguez today.     I evaluated patient, was present entire visit and concur with evaluation and treatment plan.    Jesus Rodriguez MD

## 2022-09-03 ENCOUNTER — HEALTH MAINTENANCE LETTER (OUTPATIENT)
Age: 77
End: 2022-09-03

## 2022-09-06 ENCOUNTER — ANCILLARY PROCEDURE (OUTPATIENT)
Dept: BONE DENSITY | Facility: CLINIC | Age: 77
End: 2022-09-06
Attending: ORTHOPAEDIC SURGERY
Payer: COMMERCIAL

## 2022-09-06 ENCOUNTER — TELEPHONE (OUTPATIENT)
Dept: ORTHOPEDICS | Facility: CLINIC | Age: 77
End: 2022-09-06

## 2022-09-06 DIAGNOSIS — M81.0 AGE-RELATED OSTEOPOROSIS WITHOUT CURRENT PATHOLOGICAL FRACTURE: ICD-10-CM

## 2022-09-06 PROCEDURE — 77080 DXA BONE DENSITY AXIAL: CPT | Performed by: INTERNAL MEDICINE

## 2022-09-06 NOTE — TELEPHONE ENCOUNTER
[facility] Call Center    Phone Message    May a detailed message be left on voicemail: yes     Reason for Call: Other: Patient is requesting his prescriptions for the semi-electrical bed rental, the therapeutic ice machine for his knee and the compression socks be sent to [facility].      In addition to the above, he says they also require any face-to-face notes and the demographic sheet.    Their FAX is [phone].    Action Taken: Message routed to:  [facility] ([facility] ortho    Travel Screening: Not Applicable

## 2022-09-06 NOTE — TELEPHONE ENCOUNTER
Faxed orders, face-to-face note, and demographic sheet to [facility]. Called pt and notified him via VM. He will call with questions or concerns.             -HARESH Davenport- Orthopedics

## 2022-09-12 ENCOUNTER — LAB (OUTPATIENT)
Dept: LAB | Facility: CLINIC | Age: 77
End: 2022-09-12
Attending: FAMILY MEDICINE
Payer: COMMERCIAL

## 2022-09-12 DIAGNOSIS — Z01.818 PRE-OPERATIVE GENERAL PHYSICAL EXAMINATION: ICD-10-CM

## 2022-09-12 PROCEDURE — U0005 INFEC AGEN DETEC AMPLI PROBE: HCPCS

## 2022-09-12 PROCEDURE — U0003 INFECTIOUS AGENT DETECTION BY NUCLEIC ACID (DNA OR RNA); SEVERE ACUTE RESPIRATORY SYNDROME CORONAVIRUS 2 (SARS-COV-2) (CORONAVIRUS DISEASE [COVID-19]), AMPLIFIED PROBE TECHNIQUE, MAKING USE OF HIGH THROUGHPUT TECHNOLOGIES AS DESCRIBED BY CMS-2020-01-R: HCPCS

## 2022-09-13 LAB — SARS-COV-2 RNA RESP QL NAA+PROBE: NEGATIVE

## 2022-09-15 ENCOUNTER — ANESTHESIA (OUTPATIENT)
Dept: SURGERY | Facility: CLINIC | Age: 77
End: 2022-09-15
Payer: COMMERCIAL

## 2022-09-15 ENCOUNTER — HOSPITAL ENCOUNTER (OUTPATIENT)
Facility: CLINIC | Age: 77
Discharge: HOME | End: 2022-09-17
Attending: ORTHOPAEDIC SURGERY | Admitting: ORTHOPAEDIC SURGERY
Payer: COMMERCIAL

## 2022-09-15 ENCOUNTER — APPOINTMENT (OUTPATIENT)
Dept: GENERAL RADIOLOGY | Facility: CLINIC | Age: 77
End: 2022-09-15
Attending: PHYSICIAN ASSISTANT
Payer: COMMERCIAL

## 2022-09-15 ENCOUNTER — DOCUMENTATION ONLY (OUTPATIENT)
Dept: OTHER | Facility: CLINIC | Age: 77
End: 2022-09-15

## 2022-09-15 ENCOUNTER — ANESTHESIA EVENT (OUTPATIENT)
Dept: SURGERY | Facility: CLINIC | Age: 77
End: 2022-09-15
Payer: COMMERCIAL

## 2022-09-15 DIAGNOSIS — G89.29 CHRONIC PAIN OF LEFT KNEE: ICD-10-CM

## 2022-09-15 DIAGNOSIS — M25.562 CHRONIC PAIN OF LEFT KNEE: ICD-10-CM

## 2022-09-15 DIAGNOSIS — Z47.89 ORTHOPEDIC AFTERCARE: Primary | ICD-10-CM

## 2022-09-15 DIAGNOSIS — Z86.39 PERSONAL HISTORY OF OTHER ENDOCRINE, NUTRITIONAL AND METABOLIC DISEASE: ICD-10-CM

## 2022-09-15 DIAGNOSIS — R33.9 URINARY RETENTION: ICD-10-CM

## 2022-09-15 DIAGNOSIS — Z96.652 STATUS POST TOTAL LEFT KNEE REPLACEMENT: Primary | ICD-10-CM

## 2022-09-15 DIAGNOSIS — M17.10 ARTHRITIS OF KNEE: ICD-10-CM

## 2022-09-15 DIAGNOSIS — Z01.818 PRE-OP EVALUATION: ICD-10-CM

## 2022-09-15 DIAGNOSIS — M81.0 AGE-RELATED OSTEOPOROSIS WITHOUT CURRENT PATHOLOGICAL FRACTURE: ICD-10-CM

## 2022-09-15 LAB
ERYTHROCYTE [DISTWIDTH] IN BLOOD BY AUTOMATED COUNT: 11.9 % (ref 10–15)
GLUCOSE BLDC GLUCOMTR-MCNC: 88 MG/DL (ref 70–99)
HCT VFR BLD AUTO: 39.5 % (ref 40–53)
HGB BLD-MCNC: 13.6 G/DL (ref 13.3–17.7)
MCH RBC QN AUTO: 29.4 PG (ref 26.5–33)
MCHC RBC AUTO-ENTMCNC: 34.4 G/DL (ref 31.5–36.5)
MCV RBC AUTO: 85 FL (ref 78–100)
PLATELET # BLD AUTO: 160 10E3/UL (ref 150–450)
RBC # BLD AUTO: 4.63 10E6/UL (ref 4.4–5.9)
WBC # BLD AUTO: 5.7 10E3/UL (ref 4–11)

## 2022-09-15 PROCEDURE — 999N000141 HC STATISTIC PRE-PROCEDURE NURSING ASSESSMENT: Performed by: ORTHOPAEDIC SURGERY

## 2022-09-15 PROCEDURE — 250N000011 HC RX IP 250 OP 636: Performed by: ANESTHESIOLOGY

## 2022-09-15 PROCEDURE — 710N000010 HC RECOVERY PHASE 1, LEVEL 2, PER MIN: Performed by: ORTHOPAEDIC SURGERY

## 2022-09-15 PROCEDURE — 250N000011 HC RX IP 250 OP 636: Performed by: ORTHOPAEDIC SURGERY

## 2022-09-15 PROCEDURE — 250N000013 HC RX MED GY IP 250 OP 250 PS 637: Performed by: ORTHOPAEDIC SURGERY

## 2022-09-15 PROCEDURE — 250N000013 HC RX MED GY IP 250 OP 250 PS 637: Performed by: STUDENT IN AN ORGANIZED HEALTH CARE EDUCATION/TRAINING PROGRAM

## 2022-09-15 PROCEDURE — 272N000001 HC OR GENERAL SUPPLY STERILE: Performed by: ORTHOPAEDIC SURGERY

## 2022-09-15 PROCEDURE — C1776 JOINT DEVICE (IMPLANTABLE): HCPCS | Performed by: ORTHOPAEDIC SURGERY

## 2022-09-15 PROCEDURE — 73560 X-RAY EXAM OF KNEE 1 OR 2: CPT | Mod: 26 | Performed by: RADIOLOGY

## 2022-09-15 PROCEDURE — 258N000003 HC RX IP 258 OP 636: Performed by: NURSE ANESTHETIST, CERTIFIED REGISTERED

## 2022-09-15 PROCEDURE — 99202 OFFICE O/P NEW SF 15 MIN: CPT | Performed by: STUDENT IN AN ORGANIZED HEALTH CARE EDUCATION/TRAINING PROGRAM

## 2022-09-15 PROCEDURE — 360N000077 HC SURGERY LEVEL 4, PER MIN: Performed by: ORTHOPAEDIC SURGERY

## 2022-09-15 PROCEDURE — 370N000017 HC ANESTHESIA TECHNICAL FEE, PER MIN: Performed by: ORTHOPAEDIC SURGERY

## 2022-09-15 PROCEDURE — 258N000003 HC RX IP 258 OP 636: Performed by: ANESTHESIOLOGY

## 2022-09-15 PROCEDURE — 250N000011 HC RX IP 250 OP 636: Performed by: PHYSICIAN ASSISTANT

## 2022-09-15 PROCEDURE — 250N000011 HC RX IP 250 OP 636: Performed by: INTERNAL MEDICINE

## 2022-09-15 PROCEDURE — 258N000003 HC RX IP 258 OP 636: Performed by: PHYSICIAN ASSISTANT

## 2022-09-15 PROCEDURE — 27447 TOTAL KNEE ARTHROPLASTY: CPT | Mod: LT | Performed by: ORTHOPAEDIC SURGERY

## 2022-09-15 PROCEDURE — 250N000009 HC RX 250: Performed by: ANESTHESIOLOGY

## 2022-09-15 PROCEDURE — 250N000011 HC RX IP 250 OP 636: Performed by: NURSE ANESTHETIST, CERTIFIED REGISTERED

## 2022-09-15 PROCEDURE — 250N000013 HC RX MED GY IP 250 OP 250 PS 637: Performed by: PHYSICIAN ASSISTANT

## 2022-09-15 PROCEDURE — 250N000009 HC RX 250: Performed by: ORTHOPAEDIC SURGERY

## 2022-09-15 PROCEDURE — 999N000065 XR KNEE PORT LEFT 1/2 VIEWS: Mod: LT

## 2022-09-15 PROCEDURE — 85027 COMPLETE CBC AUTOMATED: CPT | Performed by: ANESTHESIOLOGY

## 2022-09-15 PROCEDURE — 258N000001 HC RX 258: Performed by: ORTHOPAEDIC SURGERY

## 2022-09-15 PROCEDURE — 250N000009 HC RX 250: Performed by: NURSE ANESTHETIST, CERTIFIED REGISTERED

## 2022-09-15 PROCEDURE — C1713 ANCHOR/SCREW BN/BN,TIS/BN: HCPCS | Performed by: ORTHOPAEDIC SURGERY

## 2022-09-15 PROCEDURE — 36415 COLL VENOUS BLD VENIPUNCTURE: CPT | Performed by: ANESTHESIOLOGY

## 2022-09-15 PROCEDURE — 250N000013 HC RX MED GY IP 250 OP 250 PS 637: Performed by: ANESTHESIOLOGY

## 2022-09-15 DEVICE — FULL DOSE BONE CEMENT, 10 PACK CATALOG NUMBER IS 6191-1-010
Type: IMPLANTABLE DEVICE | Site: KNEE | Status: FUNCTIONAL
Brand: SIMPLEX

## 2022-09-15 DEVICE — ATTUNE PATELLA MEDIALIZED DOME 41MM CEMENTED AOX
Type: IMPLANTABLE DEVICE | Site: KNEE | Status: FUNCTIONAL
Brand: ATTUNE

## 2022-09-15 DEVICE — ATTUNE KNEE SYSTEM TIBIAL BASE FIXED BEARING SIZE 9 CEMENTED
Type: IMPLANTABLE DEVICE | Site: KNEE | Status: FUNCTIONAL
Brand: ATTUNE

## 2022-09-15 RX ORDER — SODIUM CHLORIDE, SODIUM LACTATE, POTASSIUM CHLORIDE, CALCIUM CHLORIDE 600; 310; 30; 20 MG/100ML; MG/100ML; MG/100ML; MG/100ML
INJECTION, SOLUTION INTRAVENOUS CONTINUOUS PRN
Status: DISCONTINUED | OUTPATIENT
Start: 2022-09-15 | End: 2022-09-15

## 2022-09-15 RX ORDER — DEXMEDETOMIDINE HYDROCHLORIDE 4 UG/ML
INJECTION, SOLUTION INTRAVENOUS
Status: COMPLETED | OUTPATIENT
Start: 2022-09-15 | End: 2022-09-15

## 2022-09-15 RX ORDER — AMOXICILLIN 250 MG
1-2 CAPSULE ORAL 2 TIMES DAILY
Qty: 30 TABLET | Refills: 0 | Status: SHIPPED | OUTPATIENT
Start: 2022-09-15 | End: 2022-10-20

## 2022-09-15 RX ORDER — ACETAMINOPHEN 325 MG/1
650 TABLET ORAL EVERY 4 HOURS PRN
Status: DISCONTINUED | OUTPATIENT
Start: 2022-09-18 | End: 2022-09-17 | Stop reason: HOSPADM

## 2022-09-15 RX ORDER — NALOXONE HYDROCHLORIDE 0.4 MG/ML
0.2 INJECTION, SOLUTION INTRAMUSCULAR; INTRAVENOUS; SUBCUTANEOUS
Status: DISCONTINUED | OUTPATIENT
Start: 2022-09-15 | End: 2022-09-17 | Stop reason: HOSPADM

## 2022-09-15 RX ORDER — ASPIRIN 81 MG/1
81 TABLET ORAL 2 TIMES DAILY
Qty: 60 TABLET | Refills: 0 | Status: SHIPPED | OUTPATIENT
Start: 2022-09-15

## 2022-09-15 RX ORDER — ASPIRIN 81 MG/1
81 TABLET ORAL 2 TIMES DAILY
Status: DISCONTINUED | OUTPATIENT
Start: 2022-09-15 | End: 2022-09-15

## 2022-09-15 RX ORDER — POLYETHYLENE GLYCOL 3350 17 G/17G
17 POWDER, FOR SOLUTION ORAL DAILY
Status: DISCONTINUED | OUTPATIENT
Start: 2022-09-16 | End: 2022-09-17 | Stop reason: HOSPADM

## 2022-09-15 RX ORDER — FENTANYL CITRATE 50 UG/ML
25 INJECTION, SOLUTION INTRAMUSCULAR; INTRAVENOUS EVERY 5 MIN PRN
Status: DISCONTINUED | OUTPATIENT
Start: 2022-09-15 | End: 2022-09-15 | Stop reason: HOSPADM

## 2022-09-15 RX ORDER — FENTANYL CITRATE 50 UG/ML
25-50 INJECTION, SOLUTION INTRAMUSCULAR; INTRAVENOUS
Status: DISCONTINUED | OUTPATIENT
Start: 2022-09-15 | End: 2022-09-15 | Stop reason: HOSPADM

## 2022-09-15 RX ORDER — OXYCODONE HYDROCHLORIDE 5 MG/1
5 TABLET ORAL EVERY 4 HOURS PRN
Status: DISCONTINUED | OUTPATIENT
Start: 2022-09-15 | End: 2022-09-17 | Stop reason: HOSPADM

## 2022-09-15 RX ORDER — HYDROMORPHONE HCL IN WATER/PF 6 MG/30 ML
0.2 PATIENT CONTROLLED ANALGESIA SYRINGE INTRAVENOUS
Status: DISCONTINUED | OUTPATIENT
Start: 2022-09-15 | End: 2022-09-17 | Stop reason: HOSPADM

## 2022-09-15 RX ORDER — ROSUVASTATIN CALCIUM 10 MG/1
10 TABLET, COATED ORAL DAILY
Status: DISCONTINUED | OUTPATIENT
Start: 2022-09-15 | End: 2022-09-17 | Stop reason: HOSPADM

## 2022-09-15 RX ORDER — PROPOFOL 10 MG/ML
INJECTION, EMULSION INTRAVENOUS CONTINUOUS PRN
Status: DISCONTINUED | OUTPATIENT
Start: 2022-09-15 | End: 2022-09-15

## 2022-09-15 RX ORDER — ONDANSETRON 4 MG/1
4 TABLET, ORALLY DISINTEGRATING ORAL EVERY 30 MIN PRN
Status: DISCONTINUED | OUTPATIENT
Start: 2022-09-15 | End: 2022-09-15 | Stop reason: HOSPADM

## 2022-09-15 RX ORDER — OXYCODONE HYDROCHLORIDE 5 MG/1
5 TABLET ORAL EVERY 4 HOURS PRN
Status: DISCONTINUED | OUTPATIENT
Start: 2022-09-15 | End: 2022-09-15 | Stop reason: HOSPADM

## 2022-09-15 RX ORDER — SODIUM CHLORIDE, SODIUM LACTATE, POTASSIUM CHLORIDE, CALCIUM CHLORIDE 600; 310; 30; 20 MG/100ML; MG/100ML; MG/100ML; MG/100ML
INJECTION, SOLUTION INTRAVENOUS CONTINUOUS
Status: DISCONTINUED | OUTPATIENT
Start: 2022-09-15 | End: 2022-09-15 | Stop reason: HOSPADM

## 2022-09-15 RX ORDER — LIDOCAINE 40 MG/G
CREAM TOPICAL
Status: DISCONTINUED | OUTPATIENT
Start: 2022-09-15 | End: 2022-09-17 | Stop reason: HOSPADM

## 2022-09-15 RX ORDER — ERGOCALCIFEROL (VITAMIN D2) 10 MCG
400 TABLET ORAL DAILY
Status: DISCONTINUED | OUTPATIENT
Start: 2022-09-15 | End: 2022-09-15 | Stop reason: ALTCHOICE

## 2022-09-15 RX ORDER — BISACODYL 10 MG
10 SUPPOSITORY, RECTAL RECTAL DAILY PRN
Status: DISCONTINUED | OUTPATIENT
Start: 2022-09-15 | End: 2022-09-17 | Stop reason: HOSPADM

## 2022-09-15 RX ORDER — ACETAMINOPHEN 325 MG/1
650 TABLET ORAL EVERY 4 HOURS PRN
Qty: 100 TABLET | Refills: 0 | Status: SHIPPED | OUTPATIENT
Start: 2022-09-15

## 2022-09-15 RX ORDER — CEFAZOLIN SODIUM/WATER 2 G/20 ML
2 SYRINGE (ML) INTRAVENOUS
Status: COMPLETED | OUTPATIENT
Start: 2022-09-15 | End: 2022-09-15

## 2022-09-15 RX ORDER — FENTANYL CITRATE-0.9 % NACL/PF 10 MCG/ML
PLASTIC BAG, INJECTION (ML) INTRAVENOUS PRN
Status: DISCONTINUED | OUTPATIENT
Start: 2022-09-15 | End: 2022-09-15

## 2022-09-15 RX ORDER — FINASTERIDE 5 MG/1
5 TABLET, FILM COATED ORAL DAILY
Status: DISCONTINUED | OUTPATIENT
Start: 2022-09-16 | End: 2022-09-17 | Stop reason: HOSPADM

## 2022-09-15 RX ORDER — EPHEDRINE SULFATE 50 MG/ML
INJECTION, SOLUTION INTRAMUSCULAR; INTRAVENOUS; SUBCUTANEOUS PRN
Status: DISCONTINUED | OUTPATIENT
Start: 2022-09-15 | End: 2022-09-15

## 2022-09-15 RX ORDER — LIDOCAINE 40 MG/G
CREAM TOPICAL
Status: DISCONTINUED | OUTPATIENT
Start: 2022-09-15 | End: 2022-09-15 | Stop reason: HOSPADM

## 2022-09-15 RX ORDER — CEFAZOLIN SODIUM/WATER 2 G/20 ML
2 SYRINGE (ML) INTRAVENOUS SEE ADMIN INSTRUCTIONS
Status: DISCONTINUED | OUTPATIENT
Start: 2022-09-15 | End: 2022-09-15 | Stop reason: HOSPADM

## 2022-09-15 RX ORDER — HYDRALAZINE HYDROCHLORIDE 20 MG/ML
10 INJECTION INTRAMUSCULAR; INTRAVENOUS EVERY 6 HOURS PRN
Status: DISCONTINUED | OUTPATIENT
Start: 2022-09-15 | End: 2022-09-17 | Stop reason: HOSPADM

## 2022-09-15 RX ORDER — NALOXONE HYDROCHLORIDE 0.4 MG/ML
0.4 INJECTION, SOLUTION INTRAMUSCULAR; INTRAVENOUS; SUBCUTANEOUS
Status: DISCONTINUED | OUTPATIENT
Start: 2022-09-15 | End: 2022-09-17 | Stop reason: HOSPADM

## 2022-09-15 RX ORDER — CHOLECALCIFEROL (VITAMIN D3) 1250 MCG
50000 CAPSULE ORAL
Status: DISCONTINUED | OUTPATIENT
Start: 2022-09-16 | End: 2022-09-17 | Stop reason: HOSPADM

## 2022-09-15 RX ORDER — ONDANSETRON 4 MG/1
4 TABLET, ORALLY DISINTEGRATING ORAL EVERY 6 HOURS PRN
Status: DISCONTINUED | OUTPATIENT
Start: 2022-09-15 | End: 2022-09-17 | Stop reason: HOSPADM

## 2022-09-15 RX ORDER — BUPIVACAINE HYDROCHLORIDE 7.5 MG/ML
INJECTION, SOLUTION INTRASPINAL
Status: COMPLETED | OUTPATIENT
Start: 2022-09-15 | End: 2022-09-15

## 2022-09-15 RX ORDER — ONDANSETRON 2 MG/ML
4 INJECTION INTRAMUSCULAR; INTRAVENOUS EVERY 6 HOURS PRN
Status: DISCONTINUED | OUTPATIENT
Start: 2022-09-15 | End: 2022-09-17 | Stop reason: HOSPADM

## 2022-09-15 RX ORDER — HYDROMORPHONE HCL IN WATER/PF 6 MG/30 ML
0.4 PATIENT CONTROLLED ANALGESIA SYRINGE INTRAVENOUS
Status: DISCONTINUED | OUTPATIENT
Start: 2022-09-15 | End: 2022-09-17 | Stop reason: HOSPADM

## 2022-09-15 RX ORDER — ACETAMINOPHEN 325 MG/1
975 TABLET ORAL EVERY 8 HOURS
Status: DISCONTINUED | OUTPATIENT
Start: 2022-09-15 | End: 2022-09-17 | Stop reason: HOSPADM

## 2022-09-15 RX ORDER — PROPOFOL 10 MG/ML
INJECTION, EMULSION INTRAVENOUS PRN
Status: DISCONTINUED | OUTPATIENT
Start: 2022-09-15 | End: 2022-09-15

## 2022-09-15 RX ORDER — FENTANYL CITRATE 50 UG/ML
INJECTION, SOLUTION INTRAMUSCULAR; INTRAVENOUS PRN
Status: DISCONTINUED | OUTPATIENT
Start: 2022-09-15 | End: 2022-09-15

## 2022-09-15 RX ORDER — SODIUM CHLORIDE, SODIUM LACTATE, POTASSIUM CHLORIDE, CALCIUM CHLORIDE 600; 310; 30; 20 MG/100ML; MG/100ML; MG/100ML; MG/100ML
INJECTION, SOLUTION INTRAVENOUS CONTINUOUS
Status: DISCONTINUED | OUTPATIENT
Start: 2022-09-15 | End: 2022-09-17 | Stop reason: HOSPADM

## 2022-09-15 RX ORDER — DEXAMETHASONE SODIUM PHOSPHATE 10 MG/ML
INJECTION, SOLUTION INTRAMUSCULAR; INTRAVENOUS
Status: COMPLETED | OUTPATIENT
Start: 2022-09-15 | End: 2022-09-15

## 2022-09-15 RX ORDER — ONDANSETRON 2 MG/ML
INJECTION INTRAMUSCULAR; INTRAVENOUS PRN
Status: DISCONTINUED | OUTPATIENT
Start: 2022-09-15 | End: 2022-09-15

## 2022-09-15 RX ORDER — DULOXETIN HYDROCHLORIDE 30 MG/1
30 CAPSULE, DELAYED RELEASE ORAL DAILY
Status: DISCONTINUED | OUTPATIENT
Start: 2022-09-16 | End: 2022-09-17 | Stop reason: HOSPADM

## 2022-09-15 RX ORDER — ASPIRIN 81 MG/1
81 TABLET ORAL 2 TIMES DAILY
Status: DISCONTINUED | OUTPATIENT
Start: 2022-09-16 | End: 2022-09-17 | Stop reason: HOSPADM

## 2022-09-15 RX ORDER — BUPIVACAINE HYDROCHLORIDE AND EPINEPHRINE 2.5; 5 MG/ML; UG/ML
INJECTION, SOLUTION INFILTRATION; PERINEURAL
Status: COMPLETED | OUTPATIENT
Start: 2022-09-15 | End: 2022-09-15

## 2022-09-15 RX ORDER — AMOXICILLIN 250 MG
1 CAPSULE ORAL 2 TIMES DAILY
Status: DISCONTINUED | OUTPATIENT
Start: 2022-09-15 | End: 2022-09-17 | Stop reason: HOSPADM

## 2022-09-15 RX ORDER — BUPIVACAINE HYDROCHLORIDE 2.5 MG/ML
INJECTION, SOLUTION INFILTRATION; PERINEURAL PRN
Status: DISCONTINUED | OUTPATIENT
Start: 2022-09-15 | End: 2022-09-15 | Stop reason: HOSPADM

## 2022-09-15 RX ORDER — TRANEXAMIC ACID 650 MG/1
1950 TABLET ORAL ONCE
Status: COMPLETED | OUTPATIENT
Start: 2022-09-15 | End: 2022-09-15

## 2022-09-15 RX ORDER — VALSARTAN 80 MG/1
80 TABLET ORAL DAILY
Status: DISCONTINUED | OUTPATIENT
Start: 2022-09-16 | End: 2022-09-17 | Stop reason: HOSPADM

## 2022-09-15 RX ORDER — HYDRALAZINE HYDROCHLORIDE 20 MG/ML
10 INJECTION INTRAMUSCULAR; INTRAVENOUS EVERY 6 HOURS PRN
Status: DISCONTINUED | OUTPATIENT
Start: 2022-09-15 | End: 2022-09-15

## 2022-09-15 RX ORDER — ONDANSETRON 2 MG/ML
4 INJECTION INTRAMUSCULAR; INTRAVENOUS EVERY 30 MIN PRN
Status: DISCONTINUED | OUTPATIENT
Start: 2022-09-15 | End: 2022-09-15 | Stop reason: HOSPADM

## 2022-09-15 RX ORDER — OXYCODONE HYDROCHLORIDE 10 MG/1
10 TABLET ORAL EVERY 4 HOURS PRN
Status: DISCONTINUED | OUTPATIENT
Start: 2022-09-15 | End: 2022-09-17 | Stop reason: HOSPADM

## 2022-09-15 RX ORDER — OXYCODONE HYDROCHLORIDE 5 MG/1
5-10 TABLET ORAL EVERY 4 HOURS PRN
Qty: 30 TABLET | Refills: 0 | Status: SHIPPED | OUTPATIENT
Start: 2022-09-15 | End: 2022-09-17

## 2022-09-15 RX ORDER — PANTOPRAZOLE SODIUM 40 MG/1
40 TABLET, DELAYED RELEASE ORAL
Refills: 3 | Status: DISCONTINUED | OUTPATIENT
Start: 2022-09-16 | End: 2022-09-17 | Stop reason: HOSPADM

## 2022-09-15 RX ORDER — HYDROMORPHONE HCL IN WATER/PF 6 MG/30 ML
0.2 PATIENT CONTROLLED ANALGESIA SYRINGE INTRAVENOUS EVERY 5 MIN PRN
Status: DISCONTINUED | OUTPATIENT
Start: 2022-09-15 | End: 2022-09-15 | Stop reason: HOSPADM

## 2022-09-15 RX ORDER — CEFAZOLIN SODIUM 2 G/100ML
2 INJECTION, SOLUTION INTRAVENOUS EVERY 8 HOURS
Status: COMPLETED | OUTPATIENT
Start: 2022-09-16 | End: 2022-09-16

## 2022-09-15 RX ORDER — FLUMAZENIL 0.1 MG/ML
0.2 INJECTION, SOLUTION INTRAVENOUS
Status: DISCONTINUED | OUTPATIENT
Start: 2022-09-15 | End: 2022-09-15 | Stop reason: HOSPADM

## 2022-09-15 RX ADMIN — Medication 5 MG: at 15:16

## 2022-09-15 RX ADMIN — DEXAMETHASONE SODIUM PHOSPHATE 2 MG: 10 INJECTION, SOLUTION INTRAMUSCULAR; INTRAVENOUS at 12:03

## 2022-09-15 RX ADMIN — Medication 5 MG: at 15:09

## 2022-09-15 RX ADMIN — SODIUM CHLORIDE, POTASSIUM CHLORIDE, SODIUM LACTATE AND CALCIUM CHLORIDE: 600; 310; 30; 20 INJECTION, SOLUTION INTRAVENOUS at 13:36

## 2022-09-15 RX ADMIN — SENNOSIDES AND DOCUSATE SODIUM 1 TABLET: 8.6; 5 TABLET ORAL at 19:42

## 2022-09-15 RX ADMIN — OXYCODONE HYDROCHLORIDE 5 MG: 5 TABLET ORAL at 18:21

## 2022-09-15 RX ADMIN — Medication 20 MCG: at 12:03

## 2022-09-15 RX ADMIN — MIDAZOLAM HYDROCHLORIDE 1 MG: 1 INJECTION, SOLUTION INTRAMUSCULAR; INTRAVENOUS at 12:09

## 2022-09-15 RX ADMIN — Medication 2 G: at 13:29

## 2022-09-15 RX ADMIN — PROPOFOL 75 MCG/KG/MIN: 10 INJECTION, EMULSION INTRAVENOUS at 13:36

## 2022-09-15 RX ADMIN — TRANEXAMIC ACID 1950 MG: 650 TABLET ORAL at 10:28

## 2022-09-15 RX ADMIN — BUPIVACAINE HYDROCHLORIDE AND EPINEPHRINE BITARTRATE 20 ML: 2.5; .005 INJECTION, SOLUTION INFILTRATION; PERINEURAL at 12:03

## 2022-09-15 RX ADMIN — OXYCODONE HYDROCHLORIDE 5 MG: 5 TABLET ORAL at 16:21

## 2022-09-15 RX ADMIN — Medication 2 G: at 15:32

## 2022-09-15 RX ADMIN — FENTANYL CITRATE 25 MCG: 50 INJECTION, SOLUTION INTRAMUSCULAR; INTRAVENOUS at 14:56

## 2022-09-15 RX ADMIN — SODIUM CHLORIDE, POTASSIUM CHLORIDE, SODIUM LACTATE AND CALCIUM CHLORIDE: 600; 310; 30; 20 INJECTION, SOLUTION INTRAVENOUS at 12:07

## 2022-09-15 RX ADMIN — SODIUM CHLORIDE, POTASSIUM CHLORIDE, SODIUM LACTATE AND CALCIUM CHLORIDE: 600; 310; 30; 20 INJECTION, SOLUTION INTRAVENOUS at 18:20

## 2022-09-15 RX ADMIN — FENTANYL CITRATE 25 MCG: 50 INJECTION, SOLUTION INTRAMUSCULAR; INTRAVENOUS at 13:19

## 2022-09-15 RX ADMIN — BUPIVACAINE HYDROCHLORIDE IN DEXTROSE 1.6 ML: 7.5 INJECTION, SOLUTION SUBARACHNOID at 13:30

## 2022-09-15 RX ADMIN — PROPOFOL 60 MG: 10 INJECTION, EMULSION INTRAVENOUS at 13:36

## 2022-09-15 RX ADMIN — HYDROMORPHONE HYDROCHLORIDE 0.4 MG: 0.2 INJECTION, SOLUTION INTRAMUSCULAR; INTRAVENOUS; SUBCUTANEOUS at 19:41

## 2022-09-15 RX ADMIN — Medication 100 MCG: at 15:19

## 2022-09-15 RX ADMIN — ONDANSETRON 4 MG: 2 INJECTION INTRAMUSCULAR; INTRAVENOUS at 13:36

## 2022-09-15 RX ADMIN — FENTANYL CITRATE 25 MCG: 50 INJECTION, SOLUTION INTRAMUSCULAR; INTRAVENOUS at 13:28

## 2022-09-15 RX ADMIN — FENTANYL CITRATE 50 MCG: 50 INJECTION, SOLUTION INTRAMUSCULAR; INTRAVENOUS at 12:09

## 2022-09-15 RX ADMIN — OXYCODONE HYDROCHLORIDE 10 MG: 10 TABLET ORAL at 22:26

## 2022-09-15 RX ADMIN — ROSUVASTATIN CALCIUM 10 MG: 10 TABLET, FILM COATED ORAL at 20:16

## 2022-09-15 RX ADMIN — HYDRALAZINE HYDROCHLORIDE 10 MG: 20 INJECTION INTRAMUSCULAR; INTRAVENOUS at 20:16

## 2022-09-15 RX ADMIN — ACETAMINOPHEN 975 MG: 325 TABLET, FILM COATED ORAL at 16:24

## 2022-09-15 ASSESSMENT — ACTIVITIES OF DAILY LIVING (ADL)
ADLS_ACUITY_SCORE: 20

## 2022-09-15 NOTE — ANESTHESIA PROCEDURE NOTES
Intrathecal injection Procedure Note    Pre-Procedure   Staff -        Anesthesiologist:  Mihir León DO       Performed By: anesthesiologist       Location: OR       Procedure Start/Stop Times: 9/15/2022 1:30 PM and 9/15/2022 1:30 PM       Pre-Anesthestic Checklist: patient identified, IV checked, risks and benefits discussed, informed consent, monitors and equipment checked, pre-op evaluation, at physician/surgeon's request and post-op pain management  Procedure Documentation  Procedure: intrathecal injection       Insertion Site: L3-4. (midline approach).       Needle Gauge: 25.        Needle Length (Inches): 3.5        Spinal Needle Type: Pencan       Introducer used       # of attempts: 1 and  # of redirects:     Assessment/Narrative         Paresthesias: No.       CSF fluid: clear.    Medication(s) Administered   0.75% Hyperbaric Bupivacaine (Intrathecal) - Intrathecal   1.6 mL - 9/15/2022 1:30:00 PM  Medication Administration Time: 9/15/2022 1:30 PM

## 2022-09-15 NOTE — OR NURSING
PACU to Inpatient Nursing Handoff    Patient Jerry Miguel is a 77 year old male who speaks English.   Procedure Procedure(s):  ARTHROPLASTY, KNEE, TOTAL LEFT   Surgeon(s) Primary: Jesus Rodriguez MD  Assisting: Martha Quinn PA-C  Resident - Assisting: Mayco Carmona MD     Allergies   Allergen Reactions     Codeine Sulfate Nausea and Vomiting       Isolation  [unfilled]     Past Medical History   has a past medical history of Arthritis, BPH (benign prostatic hyperplasia), Chronic prostatitis, Gastro-oesophageal reflux disease, Hypertension, Kidney stone, Shingles, and Thyroid disease (2001-about).    Anesthesia Spinal   Dermatome Level Dermatomes Left: L2  Dermatomes Right: L2   Preop Meds Not applicable   Nerve block Spinal .  Location:spinal. hyperbaric bupivicaine. Time given: 1330  Adductor canal block  At 1203   Intraop Meds     fentaNYL (SUBLIMAZE) injection (mcg)  Total dose:  75 mcg    Date/Time Rate/Dose/Volume Action Route Admin User Audit    09/15/22 1319 25 mcg Given Intravenous Becky Daniels APRN CRNA     1328 25 mcg Given Intravenous Becky Daniels APRN CRNA     1456 25 mcg Given Intravenous Becky Daniels APRN CRNA       propofol (DIPRIVAN) injection 10 mg/mL vial (mg)  Total dose:  60 mg    Date/Time Rate/Dose/Volume Action Route Admin User Audit    09/15/22 1336 60 mg Given Intravenous Becky Daniels APRN CRNA       propofol (DIPRIVAN) 10 mg/mL (mcg/kg/min)  Total dose:  883.43 mg Dosing weight:  89.1    Date/Time Rate/Dose/Volume Action Route Admin User Audit    09/15/22 1336 75 mcg/kg/min - 40.095 mL/hr New Bag Intravenous Becky Daniels APRN CRNA     1447 85 mcg/kg/min - 45.441 mL/hr Rate Change Intravenous Becky Daniels APRN CRNA     1541  Stopped Intravenous Becky Daniels APRN CRNA       ondansetron 2mg/mL (mg)  Total dose:  4 mg    Date/Time Rate/Dose/Volume Action Route Admin User Audit    09/15/22 1336 4 mg Given Intravenous Frances  ZAK Ba CRNA       ePHEDrine 5 mg/mL (mg)  Total dose:  10 mg    Date/Time Rate/Dose/Volume Action Route Admin User Audit    09/15/22 1509 5 mg Given Intravenous Becky Daniels APRN CRNA     1516 5 mg Given Intravenous Becky Daniels APRN CRNA       phenylephrine 100mcg/ml (mcg)  Total dose:  100 mcg    Date/Time Rate/Dose/Volume Action Route Admin User Audit    09/15/22 1519 100 mcg Given Intravenous Becky Daniels APRN CRNA       ceFAZolin Sodium (ANCEF) injection 2 g (g)  Total dose:  4 g Dosing weight:  90.3    Date/Time Rate/Dose/Volume Action Route Admin User Audit    09/15/22 1329 2 g (over 5 min) Given Intravenous Becky Daniels APRN CRNA     1532 2 g Given Intravenous Becky Daniels APRN CRNA     Comment: Given per surgeon, he requested a second dose       Dexmedetomidine 4 mcg/mL (Perineural) (mcg)  Total dose:  20 mcg    Date/Time Rate/Dose/Volume Action Route Admin User Audit    09/15/22 1203 20 mcg Given Perineural Marcellus Power MD       Bupivacaine 0.25% w/ 1:200K Epi (Injection) (mL)  Total volume:  20 mL    Date/Time Rate/Dose/Volume Action Route Admin User Audit    09/15/22 1203 20 mL Given Injection Marcellus Power MD       Dexamethasone 10 mg/mL PF (Perineural) (mg)  Total dose:  2 mg    Date/Time Rate/Dose/Volume Action Route Admin User Audit    09/15/22 1203 2 mg Given Perineural Marcellus Power MD       0.75% Hyperbaric Bupivacaine (Intrathecal) (mL)  Total volume:  1.6 mL    Date/Time Rate/Dose/Volume Action Route Admin User Audit    09/15/22 1330 1.6 mL Given Intrathecal Mihir León DO       LR (mL)  Total volume:  900 mL    Date/Time Rate/Dose/Volume Action Route Admin User Audit    09/15/22 1336  New Bag Intravenous Becky Daniels APRN CRNA     1555 900 mL Anesthesia Volume Adjustment Intravenous Becky Daniels APRN CRNA          Local Meds Yes   Antibiotics cefazolin (Ancef) - last given at 1329     Pain Patient  Currently in Pain: yes   PACU meds  acetaminophen (Tylenol): 975 mg (total dose) last given at 1625   oxycodone (Roxicodone): 5 mg (total dose) last given at 1421    PCA / epidural No   Capnography     Telemetry ECG Rhythm: Normal sinus rhythm   Inpatient Telemetry Monitor Ordered? No        Labs Glucose Lab Results   Component Value Date    GLC 88 09/15/2022    GLC 91 08/30/2022    GLC 98 09/28/2020       Hgb Lab Results   Component Value Date    HGB 13.6 09/15/2022    HGB 16.4 01/30/2018       INR No results found for: INR   PACU Imaging Completed     Wound/Incision Incision/Surgical Site 01/04/16 Right Knee (Active)   Number of days: 2446       Incision/Surgical Site 09/15/22 Left Knee (Active)   Incision Assessment [facility] 09/15/22 1545   Herm[place]-Incision Assessment [place] 09/15/22 1538   Closure Approximated;Sutures;Liquid bandage 09/15/22 1545   Incision Drainage Amount None 09/15/22 1538   Dressing Intervention New dressing applied;Clean, dry, intact 09/15/22 1545   Number of days: 0      CMS        Equipment ice pack   Other LDA       IV Access Peripheral IV 09/15/22 Right Hand (Active)   Site Assessment [facility] 09/15/22 1538   Line Status Infusing 09/15/22 1538   Dressing Intervention New dressing  09/15/22 1044   Phlebitis Scale 0-->no symptoms 09/15/22 1538   Infiltration Scale 0 09/15/22 1538   Number of days: 0       Peripheral IV 01/04/16 Left Hand (Active)   Number of days: 2446      Blood Products Not applicable EBL 17 mL   Intake/Output Date 09/15/22 0700 - 09/16/22 0659   Shift 9981-3784 0955-2507 1208-1003 24 Hour Total   INTAKE   P.O.  200  200   I.V.  900  900   Shift Total(mL/kg)  1100(12.35)  1100(12.35)   OUTPUT   Shift Total(mL/kg)       Weight (kg) 89.1 89.1 89.1 89.1      Drains / Walsh Closed/Suction Drain 1 Left;Lateral Knee Accordion 10 [place] (Active)   Site Description UTV 09/15/22 1538   Dressing Status Normal: Clean, Dry & Intact 09/15/22 1538   Drainage Appearance Bloody/Bright Red 09/15/22 1534    Status To bulb suction 09/15/22 0788   Number of days: 0      Time of void PreOp Void Prior to Procedure: 1202 (09/15/22 1202)    PostOp Urine Occurrence: 1 (09/15/22 1202)    Diapered? No   Bladder Scan Bladder Scan Volume (mL): 275 ml (09/15/22 1600)    mL (09/15/22 1615)  crackers and water     Vitals    B/P: 109/73  T: 99  F (37.2  C)    Temp src: Axillary  P:  Pulse: 66 (09/15/22 1615)          R: 11  O2:  SpO2: 96 %    O2 Device: None (Room air) (09/15/22 1215)    Oxygen Delivery: 2 LPM (09/15/22 1600)         Family/support present granddaughter at home, will come in when patient in his room   Patient belongings     Patient transported on cart   DC meds/scripts (obs/outpt) Not applicable   Inpatient Pain Meds Released? Yes       Special needs/considerations None   Tasks needing completion None       Jeny Casanova RN  [facility] [id number]

## 2022-09-15 NOTE — BRIEF OP NOTE
Orthopaedic Surgery Brief Op-Note     Patient: Jerry Miguel  : 1945  Date of Service: 9/15/2022 4:01 PM    Pre-operative Diagnosis: Arthritis of knee [M17.10]  Post-operative Diagnosis: same    Procedure(s) Performed: Procedure(s):  ARTHROPLASTY, KNEE, TOTAL LEFT    Staff: Dr. Rodriguez  Assistants:   Martha Quinn PA-C    Anesthesia: General, spinal  EBL: 17 cc  Tourniquet Time: 107 minutes at 295 mmHg    Drains: hemovac  Intra-op Labs/Cxs/Specimens: none  Complications: none apparent  Findings: plese see dictated operative note     Dispo: Stable to PACU, then to Med/surg floor.    POST OPERATIVE PLAN:    Assessment/Plan: Jerry Miguel is a 77 year old male s/p Procedure(s):  ARTHROPLASTY, KNEE, TOTAL LEFT on 9/15/2022 with Dr. Rodriguez.    Activity:  Up with assist.    Weight bearing status: WBAT     Antibiotics: Cefazolin  x 24 hours.    Diet: Begin with clear fluids and progress diet as tolerated.     DVT prophylaxis: mechanical while in the hospital with ASA 162mg daily x 4 weeks.      Wound Care: Dressing change at bedside by Ortho on POD #1 to remove Arellano dressing. Leave Aquacel in place.     Drains: Document output per shift, discontinue when <30 cc/shift or at Ortho discretion.     Pain management: transition from IV to orals as tolerated.      X-rays: in PACU.    Physical Therapy:  ROM, ADL's.     Occupational Therapy: ADLs    Labs: Trend Hgb on POD #1, 2.    Cultures: none.     Consults: PT, OT, Medicine, appreciate assistance in caring for this patient.     Future Appointments   Date Time Provider Department Center   2022  9:00 AM Amirah Gordon Pt, PT [facility] [place]   2022  1:00 PM Nova Royal OT [facility] [place]   2022  3:15 PM Leighann Dykes, FABIOLA [facility] [place]   2022  2:00 PM Geraldo Appiah, PT [facility]   2022 11:30 AM Geraldo Appiah, PT [facility]   2022 10:50 AM Geraldo Appiah, PT [facility] [place]   10/4/2022 10:50 AM Td,  Geraldo PT [facility]   10/7/2022 10:50 AM Geraldo Appiah, PT [facility]       Disposition: Pending progress with therapies, pain control on orals, and medical stability, anticipate discharge to home on POD #1-2.    Martha Quinn PA-C  9/15/2022 4:01 PM  Orthopaedic Surgery     Thank you for allowing me to participate in this patient's care. Please page me directly any questions/concerns.   Securely message with the Vocera Web Console (learn more here)  Text page via [employer] Paging/Directory    If there is no response, if it is a weekend, or if it is during evening hours, please page the orthopaedic surgery resident on call via [employer] Paging/Directory

## 2022-09-15 NOTE — ANESTHESIA PROCEDURE NOTES
Adductor canal Procedure Note    Pre-Procedure   Staff -        Anesthesiologist:  Marcellus Power MD       Performed By: anesthesiologist       Location: pre-op       Procedure Start/Stop Times: 9/15/2022 12:03 PM and 9/15/2022 12:09 PM       Pre-Anesthestic Checklist: patient identified, IV checked, site marked, risks and benefits discussed, informed consent, monitors and equipment checked, pre-op evaluation, at physician/surgeon's request and post-op pain management  Timeout:       Correct Patient: Yes        Correct Procedure: Yes        Correct Site: Yes        Correct Position: Yes        Correct Laterality: Yes        Site Marked: Yes  Procedure Documentation  Procedure: Adductor canal       Laterality: left       Patient Position: supine       Patient Prep/Sterile Barriers: sterile gloves, mask       Skin prep: Chloraprep       Needle Type: short bevel       Needle Gauge: 21.        Needle Length (millimeters): 100        Ultrasound guided       1. Ultrasound was used to identify targeted nerve, plexus, vascular marker, or fascial plane and place a needle adjacent to it in real-time.       2. Ultrasound was used to visualize the spread of anesthetic in close proximity to the above referenced structure.       3. A permanent image is entered into the patient's record.       4. The visualized anatomic structures appeared normal.       5. There were no apparent abnormal pathologic findings.    Assessment/Narrative         The placement was negative for: blood aspirated, painful injection and site bleeding       Paresthesias: No.       Bolus given via needle..        Secured via.        Insertion/Infusion Method: Single Shot       Complications: none       Injection made incrementally with aspirations every 5 mL.    Medication(s) Administered   Bupivacaine 0.25% w/ 1:200K Epi (Injection) - Injection   20 mL - 9/15/2022 12:03:00 PM  Dexamethasone 10 mg/mL PF (Perineural) - Perineural   2 mg - 9/15/2022  12:03:00 PM  Dexmedetomidine 4 mcg/mL (Perineural) - Perineural   20 mcg - 9/15/2022 12:03:00 PM  Medication Administration Time: 9/15/2022 12:03 PM

## 2022-09-15 NOTE — PLAN OF CARE
"  VS: BP (!) 149/89   Pulse 63   Temp 97.9  F (36.6  C) (Axillary)   Resp 12   Ht 1.784 m (5' 10.24\")   Wt 89.1 kg (196 lb 6.9 oz)   SpO2 99%   BMI 28.00 kg/m       O2: Stable on room air. CAPNO   Output: Straight cathed 1000 mL; tried to void prior to straight cath- 50 mL out   Last BM: 9/14. Active bowel sounds in all 4 quadrants   Activity: Ax1 with gait belt and walker. Stood at bedside for about 3 minutes. Pt started to feel nauseous and clammy and stated that this happened with his last knee surgery.    Skin: L knee surgical incision.    Pain: Surgical incision pain managed with prn medications and cold packs. 10mg Oxycodone given at 2230.    CMS: Baseline numbness in R foot. Spinal- sensation is back   Dressing: CDI   Diet: Regular diet, thin liquids, takes pills whole   LDA: R PIV infusing. Hemovac- 70 mL out   Equipment: IV pole, CAPNO, walker, gait belt, personal belongings   Plan: Potential discharge to home tomorrow   Additional Info: - Pt arrived to unit around 1720.   - A&Ox4. Denies chest pain, headache, SOB, new numbness or tingling. Uses call light appropriately and makes needs known.      - Patient vital signs are at baseline: unmet; Hydralazine given x1 this shift for /99. Will restart Valsartan in AM.   ~ Patient able to ambulate as they were prior to admission or with assist devices provided by therapies during their stay: unmet; pt only able to stand at bedside for about 3 minutes. C/o feeling nauseous and clammy- stated this happened after his last surgery.   ~ Patient MUST void prior to discharge: unmet  ~ Patient able to tolerate oral intake to maintain hydration status: met  ~ Patient has adequate pain control using Oral analgesics: met  ~ Hypercapnia, hypoventilation or hypoxia resolved for at least 2 hours without supplemental oxygen: met  ~ Deficits in sensation, mobility or coordination have resolved if spinal or regional anesthesia was used: met  ~ Patient has returned to " baseline mental status: met

## 2022-09-15 NOTE — ANESTHESIA PREPROCEDURE EVALUATION
Anesthesia Pre-Procedure Evaluation    Patient: Jerry Miguel   MRN: [id number] : 1945        Procedure : Procedure(s):  ARTHROPLASTY, KNEE, TOTAL LEFT          Past Medical History:   Diagnosis Date     Arthritis     knee     BPH (benign prostatic hyperplasia)      Chronic prostatitis      Gastro-oesophageal reflux disease      Hypertension      Kidney stone      Shingles      Thyroid disease -    h/o hyperthyroid,treated, now normal      Past Surgical History:   Procedure Laterality Date     APPENDECTOMY       ARTHROPLASTY KNEE Right 2016    Procedure: ARTHROPLASTY KNEE;  Surgeon: Manuel Ponce MD;  Location: [facility] OR     COLONOSCOPY       COLONOSCOPY WITH CO2 INSUFFLATION N/A 3/2/2018    Procedure: COLONOSCOPY WITH CO2 INSUFFLATION;  Colonoscopy, Constipation, unspecified constipation type Recent change in frequency of bowel movements, [place], BMI 31.52, CVS [place];  Surgeon: Edith Craig MD;  Location: [facility] OR     ENT SURGERY      tonsillectomy     LASER HOLMIUM LITHOTRIPSY URETER(S), INSERT STENT, COMBINED  10/11/2012    Procedure: COMBINED CYSTOSCOPY, URETEROSCOPY, LASER HOLMIUM LITHOTRIPSY URETER(S), INSERT STENT;  CYSTOSCOPY, ATTEMPTED RIGHT URETEROSCOPY, INSERT RIGHT URETERAL STENT, Laser Standby;  Surgeon: Petar Ulrich MD;  Location: [facility] OR      Allergies   Allergen Reactions     Codeine Sulfate Nausea and Vomiting      Social History     Tobacco Use     Smoking status: Former Smoker     Packs/day: 1.00     Years: 10.00     Pack years: 10.00     Types: Cigarettes     Quit date: 1973     Years since quittin.6     Smokeless tobacco: Never Used   Substance Use Topics     Alcohol use: Not Currently     Comment: 3x week      Wt Readings from Last 1 Encounters:   09/15/22 89.1 kg (196 lb 6.9 oz)        Anesthesia Evaluation            ROS/MED HX  ENT/Pulmonary:       Neurologic:       Cardiovascular:     (+) hypertension-----    METS/Exercise Tolerance:      Hematologic:       Musculoskeletal:       GI/Hepatic:       Renal/Genitourinary:     (+) renal disease,     Endo:     (+) thyroid problem,     Psychiatric/Substance Use:       Infectious Disease:       Malignancy:       Other:            Physical Exam    Airway        Mallampati: II   TM distance: > 3 FB   Neck ROM: full   Mouth opening: > 3 cm    Respiratory Devices and Support         Dental       (+) caps    B=Bridge, C=Chipped, L=Loose, M=Missing    Cardiovascular   cardiovascular exam normal          Pulmonary   pulmonary exam normal                OUTSIDE LABS:  CBC:   Lab Results   Component Value Date    WBC 8.2 01/30/2018    WBC 7.3 06/08/2012    HGB 14.5 08/30/2022    HGB 16.4 01/30/2018    HCT 46.8 01/30/2018    HCT 43.9 06/08/2012     01/30/2018     (L) 01/07/2016     BMP:   Lab Results   Component Value Date     08/30/2022     02/03/2022    POTASSIUM 4.1 08/30/2022    POTASSIUM 3.6 02/03/2022    CHLORIDE 105 08/30/2022    CHLORIDE 107 02/03/2022    CO2 29 08/30/2022    CO2 27 02/03/2022    BUN 20 08/30/2022    BUN 20 02/03/2022    CR 1.12 08/30/2022    CR 1.06 02/03/2022    GLC 88 09/15/2022    GLC 91 08/30/2022     COAGS: No results found for: PTT, INR, FIBR  POC: No results found for: BGM, HCG, HCGS  HEPATIC:   Lab Results   Component Value Date    ALBUMIN 4.1 11/21/2019    PROTTOTAL 7.8 09/18/2018    ALT 36 09/18/2018    AST 20 09/18/2018    ALKPHOS 59 09/18/2018    BILITOTAL 0.9 09/18/2018     OTHER:   Lab Results   Component Value Date    A1C 5.4 08/05/2022    AUGUSTINE 9.0 08/30/2022    PHOS 3.8 11/21/2019    TSH 1.64 02/03/2022    CRP <2.9 08/05/2022    SED 7 08/05/2022       Anesthesia Plan    ASA Status:  2      Anesthesia Type: Spinal.              Consents    Anesthesia Plan(s) and associated risks, benefits, and realistic alternatives discussed. Questions answered and patient/representative(s) expressed understanding.    - Discussed:     - Discussed with:  Patient          Postoperative Care       PONV prophylaxis: Ondansetron (or other 5HT-3), Dexamethasone or Solumedrol     Comments:                Mihir León DO

## 2022-09-15 NOTE — ANESTHESIA CARE TRANSFER NOTE
Patient: Jerry Miguel    Procedure: Procedure(s):  ARTHROPLASTY, KNEE, TOTAL LEFT       Diagnosis: Arthritis of knee [M17.10]  Diagnosis Additional Information: No value filed.    Anesthesia Type:   Spinal     Note:      Level of Consciousness: awake  Oxygen Supplementation: face mask  Level of Supplemental Oxygen (L/min / FiO2): 8  Independent Airway: airway patency satisfactory and stable  Dentition: dentition unchanged  Vital Signs Stable: post-procedure vital signs reviewed and stable  Report to RN Given: handoff report given  Patient transferred to: PACU    Handoff Report: Identifed the Patient, Identified the Reponsible Provider, Reviewed the pertinent medical history, Discussed the surgical course, Reviewed Intra-OP anesthesia mangement and issues during anesthesia, Set expectations for post-procedure period and Allowed opportunity for questions and acknowledgement of understanding      Vitals:  Vitals Value Taken Time   /56 09/15/22 1548   Temp     Pulse 77 09/15/22 1553   Resp 19 09/15/22 1553   SpO2 99 % 09/15/22 1553   Vitals shown include unvalidated device data.    Electronically Signed By: ZAK Baltazar CRNA  September 15, 2022  3:54 PM

## 2022-09-15 NOTE — ANESTHESIA POSTPROCEDURE EVALUATION
Patient: Jerry Miguel    Procedure: Procedure(s):  ARTHROPLASTY, KNEE, TOTAL LEFT       Anesthesia Type:  Spinal    Note:     Postop Pain Control: Uneventful            Sign Out: Well controlled pain   PONV: No   Neuro/Psych: Uneventful            Sign Out: Acceptable/Baseline neuro status   Airway/Respiratory: Uneventful            Sign Out: Acceptable/Baseline resp. status   CV/Hemodynamics: Uneventful            Sign Out: Acceptable CV status; No obvious hypovolemia; No obvious fluid overload   Other NRE: NONE   DID A NON-ROUTINE EVENT OCCUR? No           Last vitals:  Vitals Value Taken Time   /73 09/15/22 1605   Temp     Pulse 67 09/15/22 1610   Resp 11 09/15/22 1610   SpO2 96 % 09/15/22 1610   Vitals shown include unvalidated device data.    Electronically Signed By: Mihir León DO  September 15, 2022  4:11 PM

## 2022-09-16 ENCOUNTER — APPOINTMENT (OUTPATIENT)
Dept: PHYSICAL THERAPY | Facility: CLINIC | Age: 77
End: 2022-09-16
Attending: ORTHOPAEDIC SURGERY
Payer: COMMERCIAL

## 2022-09-16 LAB — HGB BLD-MCNC: 12.3 G/DL (ref 13.3–17.7)

## 2022-09-16 PROCEDURE — 85018 HEMOGLOBIN: CPT | Performed by: STUDENT IN AN ORGANIZED HEALTH CARE EDUCATION/TRAINING PROGRAM

## 2022-09-16 PROCEDURE — 250N000011 HC RX IP 250 OP 636: Performed by: STUDENT IN AN ORGANIZED HEALTH CARE EDUCATION/TRAINING PROGRAM

## 2022-09-16 PROCEDURE — 999N000111 HC STATISTIC OT IP EVAL DEFER: Performed by: OCCUPATIONAL THERAPIST

## 2022-09-16 PROCEDURE — 36416 COLLJ CAPILLARY BLOOD SPEC: CPT | Performed by: STUDENT IN AN ORGANIZED HEALTH CARE EDUCATION/TRAINING PROGRAM

## 2022-09-16 PROCEDURE — 250N000013 HC RX MED GY IP 250 OP 250 PS 637: Performed by: STUDENT IN AN ORGANIZED HEALTH CARE EDUCATION/TRAINING PROGRAM

## 2022-09-16 PROCEDURE — 97530 THERAPEUTIC ACTIVITIES: CPT | Mod: GP | Performed by: PHYSICAL THERAPIST

## 2022-09-16 PROCEDURE — 250N000009 HC RX 250: Performed by: PHYSICIAN ASSISTANT

## 2022-09-16 PROCEDURE — 97161 PT EVAL LOW COMPLEX 20 MIN: CPT | Mod: GP | Performed by: PHYSICAL THERAPIST

## 2022-09-16 PROCEDURE — 97116 GAIT TRAINING THERAPY: CPT | Mod: GP

## 2022-09-16 PROCEDURE — 250N000013 HC RX MED GY IP 250 OP 250 PS 637: Performed by: INTERNAL MEDICINE

## 2022-09-16 PROCEDURE — 97110 THERAPEUTIC EXERCISES: CPT | Mod: GP | Performed by: PHYSICAL THERAPIST

## 2022-09-16 PROCEDURE — 97530 THERAPEUTIC ACTIVITIES: CPT | Mod: GP

## 2022-09-16 PROCEDURE — 250N000009 HC RX 250: Performed by: INTERNAL MEDICINE

## 2022-09-16 PROCEDURE — 99214 OFFICE O/P EST MOD 30 MIN: CPT | Performed by: INTERNAL MEDICINE

## 2022-09-16 RX ORDER — LIDOCAINE HYDROCHLORIDE 20 MG/ML
JELLY TOPICAL ONCE
Status: DISCONTINUED | OUTPATIENT
Start: 2022-09-16 | End: 2022-09-16

## 2022-09-16 RX ORDER — LIDOCAINE HYDROCHLORIDE 20 MG/ML
JELLY TOPICAL ONCE
Status: COMPLETED | OUTPATIENT
Start: 2022-09-16 | End: 2022-09-16

## 2022-09-16 RX ORDER — SENNOSIDES 8.6 MG
1300 CAPSULE ORAL
COMMUNITY

## 2022-09-16 RX ORDER — TAMSULOSIN HYDROCHLORIDE 0.4 MG/1
0.4 CAPSULE ORAL DAILY
Status: DISCONTINUED | OUTPATIENT
Start: 2022-09-16 | End: 2022-09-17 | Stop reason: HOSPADM

## 2022-09-16 RX ADMIN — ASPIRIN 81 MG: 81 TABLET, COATED ORAL at 09:03

## 2022-09-16 RX ADMIN — TAMSULOSIN HYDROCHLORIDE 0.4 MG: 0.4 CAPSULE ORAL at 15:03

## 2022-09-16 RX ADMIN — SENNOSIDES AND DOCUSATE SODIUM 1 TABLET: 8.6; 5 TABLET ORAL at 09:03

## 2022-09-16 RX ADMIN — SENNOSIDES AND DOCUSATE SODIUM 1 TABLET: 8.6; 5 TABLET ORAL at 20:46

## 2022-09-16 RX ADMIN — PANTOPRAZOLE SODIUM 40 MG: 40 TABLET, DELAYED RELEASE ORAL at 07:12

## 2022-09-16 RX ADMIN — CEFAZOLIN SODIUM 2 G: 2 INJECTION, SOLUTION INTRAVENOUS at 09:53

## 2022-09-16 RX ADMIN — CEFAZOLIN SODIUM 2 G: 2 INJECTION, SOLUTION INTRAVENOUS at 00:25

## 2022-09-16 RX ADMIN — OXYCODONE HYDROCHLORIDE 10 MG: 10 TABLET ORAL at 18:36

## 2022-09-16 RX ADMIN — ACETAMINOPHEN 975 MG: 325 TABLET, FILM COATED ORAL at 09:03

## 2022-09-16 RX ADMIN — OXYCODONE HYDROCHLORIDE 10 MG: 10 TABLET ORAL at 07:12

## 2022-09-16 RX ADMIN — HYDROMORPHONE HYDROCHLORIDE 0.2 MG: 0.2 INJECTION, SOLUTION INTRAMUSCULAR; INTRAVENOUS; SUBCUTANEOUS at 17:09

## 2022-09-16 RX ADMIN — OXYCODONE HYDROCHLORIDE 10 MG: 10 TABLET ORAL at 13:28

## 2022-09-16 RX ADMIN — VALSARTAN 80 MG: 80 TABLET, FILM COATED ORAL at 09:04

## 2022-09-16 RX ADMIN — POLYETHYLENE GLYCOL 3350 17 G: 17 POWDER, FOR SOLUTION ORAL at 09:03

## 2022-09-16 RX ADMIN — LIDOCAINE HYDROCHLORIDE: 20 JELLY TOPICAL at 21:03

## 2022-09-16 RX ADMIN — ACETAMINOPHEN 975 MG: 325 TABLET, FILM COATED ORAL at 00:25

## 2022-09-16 RX ADMIN — LIDOCAINE HYDROCHLORIDE: 20 JELLY TOPICAL at 15:03

## 2022-09-16 RX ADMIN — ASPIRIN 81 MG: 81 TABLET, COATED ORAL at 20:46

## 2022-09-16 RX ADMIN — DULOXETINE HYDROCHLORIDE 30 MG: 30 CAPSULE, DELAYED RELEASE ORAL at 09:03

## 2022-09-16 RX ADMIN — OXYCODONE HYDROCHLORIDE 10 MG: 10 TABLET ORAL at 02:58

## 2022-09-16 RX ADMIN — OXYCODONE HYDROCHLORIDE 10 MG: 10 TABLET ORAL at 22:14

## 2022-09-16 RX ADMIN — ACETAMINOPHEN 975 MG: 325 TABLET, FILM COATED ORAL at 15:47

## 2022-09-16 RX ADMIN — ONDANSETRON 4 MG: 4 TABLET, ORALLY DISINTEGRATING ORAL at 04:03

## 2022-09-16 RX ADMIN — FINASTERIDE 5 MG: 5 TABLET, FILM COATED ORAL at 09:03

## 2022-09-16 RX ADMIN — Medication 50000 UNITS: at 09:53

## 2022-09-16 RX ADMIN — ROSUVASTATIN CALCIUM 10 MG: 10 TABLET, FILM COATED ORAL at 09:04

## 2022-09-16 ASSESSMENT — ACTIVITIES OF DAILY LIVING (ADL)
ADLS_ACUITY_SCORE: 28
ADLS_ACUITY_SCORE: 20
ADLS_ACUITY_SCORE: 24
ADLS_ACUITY_SCORE: 28
ADLS_ACUITY_SCORE: 20
ADLS_ACUITY_SCORE: 24
ADLS_ACUITY_SCORE: 20
ADLS_ACUITY_SCORE: 28
ADLS_ACUITY_SCORE: 28
ADLS_ACUITY_SCORE: 20

## 2022-09-16 NOTE — PROGRESS NOTES
09/16/22 0849   Quick Adds   Type of Visit Initial PT Evaluation   Living Environment   People in Home child(evin), adult;spouse   Current Living Arrangements house   Home Accessibility stairs to enter home;stairs within home   Number of Stairs, Main Entrance 2   Stair Railings, Main Entrance railing on right side (ascending)   Number of Stairs, Within Home, Primary seven   Stair Railings, Within Home, Primary railings on both sides of stairs   Transportation Anticipated family or friend will provide   Living Environment Comments Pt reports having main level living set up with walk in shower, shower chair, and raised toilet seat to perform self cares. Pt also reports having hospital bed and leg  at home to assist with bed mobility.   Self-Care   Usual Activity Tolerance moderate   Current Activity Tolerance fair   Regular Exercise Yes   Activity/Exercise Type walking  (walking dogs 2x per day, golf 2-3x per week)   Exercise Amount/Frequency daily   Equipment Currently Used at Home raised toilet seat;shower chair   Fall history within last six months no   Activity/Exercise/Self-Care Comment Pt reports daily walking and playing golf several times per week prior to surgery. No use of AD for mobility but has FWW and crutches at home if needed.   General Information   Onset of Illness/Injury or Date of Surgery 09/15/22   Referring Physician Jesus Rodriguez MD   Patient/Family Therapy Goals Statement (PT) Pt reports wanting to improve activity tolerance.   Pertinent History of Current Problem (include personal factors and/or comorbidities that impact the POC) ARTHROPLASTY, KNEE, TOTAL LEFT on 9/15/2022 with Dr. Rodriguez.   Existing Precautions/Restrictions fall   Weight-Bearing Status - LUE full weight-bearing   Weight-Bearing Status - RUE full weight-bearing   Weight-Bearing Status - LLE weight-bearing as tolerated   Weight-Bearing Status - RLE full weight-bearing   Heart Disease Risk Factors High blood pressure    General Observations Pt found supine in bed, agreeable to PT.   Cognition   Affect/Mental Status (Cognition) WNL   Orientation Status (Cognition) oriented x 4   Follows Commands (Cognition) WNL   Cognitive Status Comments Pt appeared alert and oriented throughout session. Pt able to follow multi-step commands without difficulty.   Pain Assessment   Patient Currently in Pain Yes, see Vital Sign flowsheet   Integumentary/Edema   Integumentary/Edema no deficits were identifed   Posture    Posture Protracted shoulders;Forward head position   Range of Motion (ROM)   Range of Motion ROM deficits secondary to surgical procedure;ROM deficits secondary to pain;ROM deficits secondary to swelling   Strength (Manual Muscle Testing)   Strength (Manual Muscle Testing) Deficits observed during functional mobility   Bed Mobility   Comment, (Bed Mobility) Pt performed supine<>sit with HOB elevated 45* Natanael.   Transfers   Comment, (Transfers) Pt performed sit<>stand to FWW with CGAx1.   Gait/Stairs (Locomotion)   Comment, (Gait/Stairs) Gait/stairs not yet formally assessed secondary to nausea.   Balance   Balance Comments Pt demonstrated fair sitting balance at EOB with brief onset of nausea that resolved after several minutes. Pt demonstrated fair standing balance with FWW with onset of nausea that required pt to sit back onto bed.   Sensory Examination   Sensory Perception Comments No new numbnesss or tingling reported since surgery.   Clinical Impression   Criteria for Skilled Therapeutic Intervention Yes, treatment indicated   PT Diagnosis (PT) Impaired functional mobility, balance, ROM, and strength.   Influenced by the following impairments ARTHROPLASTY, KNEE, TOTAL LEFT on 9/15/2022 with Dr. Rodriguez.   Functional limitations due to impairments Pt demonstrates impaired STS transfers, gait tolerance, stair navigation, balance, and LE strength.   Clinical Presentation (PT Evaluation Complexity) Stable/Uncomplicated   Clinical  Presentation Rationale Per clincial judgement   Clinical Decision Making (Complexity) low complexity   Planned Therapy Interventions (PT) balance training;gait training;home exercise program;ROM (range of motion);stair training;strengthening   Risk & Benefits of therapy have been explained evaluation/treatment results reviewed;care plan/treatment goals reviewed;current/potential barriers reviewed;risks/benefits reviewed;patient   PT Discharge Planning   PT Discharge Recommendation (DC Rec) home with outpatient physical therapy;home with assist   PT Rationale for DC Rec Primary discharge reccommendation is OP PT. Pt is currently below baseline and would benefit from OP PT to improve gait tolerance, stair navigation, STS transfers, balance, and LE strength.   PT Brief overview of current status Pt is currently Natanael with supine<>sit, CGAx1 with STS from bed, and CGAx1 during ambulation with FWW.   Plan of Care Review   Plan of Care Reviewed With patient   Therapy Certification   Start of care date 09/16/22   Certification date from 09/16/22   Certification date to 09/18/22   Medical Diagnosis s/p left TKA   Total Evaluation Time   Total Evaluation Time (Minutes) 11   Physical Therapy Goals   PT Frequency Daily   PT Predicted Duration/Target Date for Goal Attainment 09/17/22   PT Goals Bed Mobility;Transfers;Gait;Stairs;PT Goal 1   PT: Bed Mobility Modified independent;Supine to/from sit;Goal Met   PT: Transfers Supervision/stand-by assist;Sit to/from stand;Bed to/from chair;Assistive device   PT: Gait Supervision/stand-by assist;Rolling walker;150 feet   PT: Stairs Supervision/stand-by assist;7 stairs;Rail on both sides   PT: Goal 1 Pt will demonstrate IND with HEP per s/p TKA handout.

## 2022-09-16 NOTE — PLAN OF CARE
"VS: /63   Pulse 85   Temp (!) 96.5  F (35.8  C)   Resp 16   Ht 1.784 m (5' 10.24\")   Wt 89.1 kg (196 lb 6.9 oz)   SpO2 96%   BMI 28.00 kg/m      O2: Sating >90% on RA. Lung sounds clear. Denies chest pain and SOB.   Output: Pt was unable to void. Bladder scan was 550, pt was straight cathed for 550. Discussed with MD: if pt needs to be straight cathed one more time for a total of 4x, a iverson is to be inserted.    Last BM: 9/14/22 per pt report. Bowel sounds active x4. Passing flatus.    Activity: Up with 1 assist, FWW, and gait belt.    Up for meals? Yes    Skin: L knee incision.    Pain: Pain was managed with PRN Oxy and ice.    CMS: A&Ox4. Baseline neuropathy to BLE.    Dressing: Dressing to L knee C/D/I. PIV dressing to R hand C/D/I.   Diet: Regular. Appetite was good. Pt has intermittent nausea with activity. Declined antiemesis since rest relieved symptoms.    LDA: PIV to R hand is S/L.    Equipment: IV pole, FWW, gait belt, and personal belongings.    Plan: Continue to monitor. Call light within reach and utilized appropriately.    Additional Info:       "

## 2022-09-16 NOTE — H&P
[facility]  Internal Medicine Consult     Jerry Miguel MRN# [id number]   Age: 77 year old YOB: 1945     Date of Admission: 9/15/2022  Date of Consult: 9/15/2022    Primary Care Provider: Amaury Lyon    Requesting Service: Behavioral Health - Jesus Rodriguez MD  Reason for Consult: General Medical Evaluation      SUBJECTIVE   CC:   S/P L Total Knee Arthroplasty    Assessment and Plan/Recommendations:     Jerry Miguel is a 77 year old male with PMHx significant for HLD, HTN, BPH, CKD 3a, GERD, depression, and osteoarthritis. He presented to [facility] on 9/15 for L TKA that was performed without reported complications. IM was consulted for post-operative medical co-management.     # Status post L TKA   No reported complications. EBL 17 ml.  - Postoperative cares including pain control, incisional care, IV fluids, diet, and VTE prophylaxis per surgical team team  - PT/OT to evaluate and treat  - Check CBC and CMP in the a.m.  - Med rec completed       # HTN   - Restart Valsartan 80 mg daily tomorrow   - Hydralazine 10 mg IV PRN for SBP > 180 or DBP > 110    # CKD stage 3  Most recent Cr ranging 1.0-1.1.  - Restart Valsartan tomorrow   - Recheck CMP in the AM     # HLD   - Continue Crestor     # BPH  - Continue PTA Finasteride     # GERD   - Continue PTA Nexium     # Anxiety and Depression   - Continue PTA Duloxetin 30 mg q daily      Yeison Bonilla PA-C  Internal Medicine [facility]  Pager [phone]   September 15, 2022     HPI:   Jerry Miguel is a 77 year old male with PMHx significant for HLD, HTN, BPH, CKD 3a, GERD, depression, and osteoarthritis. He presented to [facility] on 9/15 for L TKA that was performed without reported complications. IM was consulted for post-operative medical co-management.       On my exam pt reports overall doing well. Pain is currently well controlled. Confirmed medication list. Has no acute  concerns. Pt otherwise denies fevers, chills or night sweats. Denies chest pain, SOB, or palpitations. Denies abdominal pain, nausea, vomiting, diarrhea, or change to bowel habits. No dysuria or flank pain.       Past Medical History:     Past Medical History:   Diagnosis Date     Arthritis     knee     BPH (benign prostatic hyperplasia)      Chronic prostatitis      Gastro-oesophageal reflux disease      Hypertension      Kidney stone      Shingles      Thyroid disease -about    h/o hyperthyroid,treated, now normal        Reviewed and updated in [facility].     Past Surgical History:      Past Surgical History:   Procedure Laterality Date     APPENDECTOMY       ARTHROPLASTY KNEE Right 2016    Procedure: ARTHROPLASTY KNEE;  Surgeon: Manuel Ponce MD;  Location: [facility] OR     COLONOSCOPY       COLONOSCOPY WITH CO2 INSUFFLATION N/A 3/2/2018    Procedure: COLONOSCOPY WITH CO2 INSUFFLATION;  Colonoscopy, Constipation, unspecified constipation type Recent change in frequency of bowel movements, [place], BMI 31.52, CVS [place];  Surgeon: Edith Craig MD;  Location: [facility] OR     ENT SURGERY      tonsillectomy     LASER HOLMIUM LITHOTRIPSY URETER(S), INSERT STENT, COMBINED  10/11/2012    Procedure: COMBINED CYSTOSCOPY, URETEROSCOPY, LASER HOLMIUM LITHOTRIPSY URETER(S), INSERT STENT;  CYSTOSCOPY, ATTEMPTED RIGHT URETEROSCOPY, INSERT RIGHT URETERAL STENT, Laser [place];  Surgeon: Petar Ulrich MD;  Location: [facility] OR         Social History:     Social History     Tobacco Use     Smoking status: Former Smoker     Packs/day: 1.00     Years: 10.00     Pack years: 10.00     Types: Cigarettes     Quit date: 1973     Years since quittin.6     Smokeless tobacco: Never Used   Substance Use Topics     Alcohol use: Not Currently     Comment: 3x week     Drug use: No        Family History:     Family History   Problem Relation Age of Onset     Cancer Mother         ovarian     Cancer Father         lung  "    Heart Disease Father         possibly     Cancer Paternal Grandfather         esophageal cancer     Cancer Brother 68        prostate cancer     Diabetes Maternal Grandmother      Breast Cancer No family hx of      Cancer - colorectal No family hx of      Coronary Artery Disease No family hx of      Hypertension No family hx of      Hyperlipidemia No family hx of      Cerebrovascular Disease No family hx of      Colon Cancer No family hx of      Prostate Cancer No family hx of      Other Cancer No family hx of      Depression No family hx of      Anxiety Disorder No family hx of      Mental Illness No family hx of      Substance Abuse No family hx of      Anesthesia Reaction No family hx of      Asthma No family hx of      Osteoporosis No family hx of      Genetic Disorder No family hx of      Thyroid Disease No family hx of      Obesity No family hx of      Unknown/Adopted No family hx of      Macular Degeneration No family hx of      Glaucoma No family hx of          Allergies:     Allergies   Allergen Reactions     Codeine Sulfate Nausea and Vomiting         Medications:   Reviewed. Please see MAR     Review of Systems:   10 point ROS of systems including Constitutional, Eyes, Respiratory, Cardiovascular, Gastroenterology, Genitourinary, Integumentary, Muscularskeletal, Psychiatric were all negative except for pertinent positives noted in my HPI.    OBJECTIVE   Physical Exam:   Vitals were reviewed  Blood pressure (!) 149/89, pulse 63, temperature 97.9  F (36.6  C), temperature source Axillary, resp. rate 12, height 1.784 m (5' 10.24\"), weight 89.1 kg (196 lb 6.9 oz), SpO2 99 %.  Constitutional: awake, alert, cooperative, in no acute distress. A&Ox3    Eyes: EOM, PERRLA. Sclera clear, conjunctiva normal. Anicteric   ENT: Normocephalic, without obvious abnormality, atraumatic.   Respiratory: No increased work of breathing. Clear to auscultation bilaterally, no crackles or wheezing  Cardiovascular: RRR. No " murmur or friction rub. No edema. No chest wall tenderness.  GI: Soft, non-tender without rebound or guarding. Bowel sounds are active.   Skin: no bruising or bleeding. Normal skin color, No jaundice  Musculoskeletal:  Full range of motion noted. Left knee bandages dry, ice packs in place.  Neurologic: Cranial nerves II-XII are grossly intact.   Neuropsychiatric: General: normal, calm and normal eye contact. Normal affect          Data:        Lab Results   Component Value Date     08/30/2022     09/28/2020    Lab Results   Component Value Date    CHLORIDE 105 08/30/2022    CHLORIDE 103 09/28/2020    Lab Results   Component Value Date    BUN 20 08/30/2022    BUN 21 09/28/2020      Lab Results   Component Value Date    POTASSIUM 4.1 08/30/2022    POTASSIUM 3.2 09/28/2020    Lab Results   Component Value Date    CO2 29 08/30/2022    CO2 25 09/28/2020    Lab Results   Component Value Date    CR 1.12 08/30/2022    CR 1.16 09/28/2020        Lab Results   Component Value Date    WBC 5.7 09/15/2022    HGB 13.6 09/15/2022    HCT 39.5 (L) 09/15/2022    MCV 85 09/15/2022     09/15/2022     Lab Results   Component Value Date    WBC 5.7 09/15/2022

## 2022-09-16 NOTE — PLAN OF CARE
[facility]      OUTPATIENT PHYSICAL THERAPY EVALUATION  PLAN OF TREATMENT FOR OUTPATIENT REHABILITATION  (COMPLETE FOR INITIAL CLAIMS ONLY)  Patient's Last Name, First Name, M.I.  YOB: 1945  Jerry Miguel                        Provider's Name  [facility] Medical Record No.  [id number]                               Onset Date:  09/15/22   Start of Care Date:  09/16/22      Type:     _X_PT   ___OT   ___SLP Medical Diagnosis:  s/p left TKA                        PT Diagnosis:  Impaired functional mobility, balance, ROM, and strength.   Visits from SOC:  1   _________________________________________________________________________________  Plan of Treatment/Functional Goals    Planned Interventions: balance training, gait training, home exercise program, ROM (range of motion), stair training, strengthening     Goals: See Physical Therapy Goals on Care Plan in [employer] electronic health record.    Therapy Frequency: Daily  Predicted Duration of Therapy Intervention: 09/17/22  _________________________________________________________________________________    I CERTIFY THE NEED FOR THESE SERVICES FURNISHED UNDER        THIS PLAN OF TREATMENT AND WHILE UNDER MY CARE     (Physician co-signature of this document indicates review and certification of the therapy plan).              Certification date from: 09/16/22, Certification date to: 09/18/22    Referring Physician: Jesus Rodriguez MD            Initial Assessment        See Physical Therapy evaluation dated 09/16/22 in Epic electronic health record.

## 2022-09-16 NOTE — PROGRESS NOTES
Orthopaedic Surgery Progress Note 09/16/2022    S: Difficulty urinating, straight cath x1. Otherwise NAEON.  Pain well controlled. Denies numbness or tingling. Denies chest pain, SOB, nausea/vomiting. Tolerating some diet. Voiding spontaneously.  Has been OOB, no formal PT yet.    O:  Temp: 97.6  F (36.4  C) Temp src: Oral BP: 136/67 Pulse: 86   Resp: 15 SpO2: 93 % O2 Device: None (Room air) Oxygen Delivery: 1 LPM    Exam:  Gen: No acute distress, resting comfortably in bed.  Resp: Non-labored breathing  MSK:    LLE:  - Dressings c/d/i; drain in place  - SILT femoral/tibial/sural/saphenous/DP/SP nerves  - Fires Quad, TA, EHL, FHL, GaSC  - PT/DP pulses 2+, foot wwp    Output by Drain (mL) 09/14/22 0700 - 09/14/22 1459 09/14/22 1500 - 09/14/22 2259 09/14/22 2300 - 09/15/22 0659 09/15/22 0700 - 09/15/22 1459 09/15/22 1500 - 09/15/22 2259 09/15/22 2300 - 09/16/22 0659 09/16/22 0700 - 09/16/22 0715   Closed/Suction Drain 1 Left;Lateral Knee Accordion 10 Yi     105         Recent Labs   Lab 09/16/22  0513 09/15/22  1144   WBC  --  5.7   HGB 12.3* 13.6   PLT  --  160     Assessment/Plan: Jerry Miguel is a 77 year old male s/p Procedure(s):  ARTHROPLASTY, KNEE, TOTAL LEFT on 9/15/2022 with Dr. Rodriguez.    09/16/22  PT/OT  Pain control  Dressing down, drain removal  Likely home today      Activity:  Up with assist.    Weight bearing status: WBAT     Antibiotics: Cefazolin  x 24 hours.    Diet: Begin with clear fluids and progress diet as tolerated.     DVT prophylaxis: mechanical while in the hospital with ASA 162mg daily x 4 weeks.      Wound Care: Dressing change at bedside by Ortho on POD #1 to remove Arellano dressing. Leave Aquacel in place.     Drains: Document output per shift, discontinue when <30 cc/shift or at Ortho discretion.     Pain management: transition from IV to orals as tolerated.      X-rays: in PACU - complete     Physical Therapy:  ROM, ADL's.     Occupational Therapy: ADLs    Labs: Trend Hgb on POD  #1, 2.    Cultures: none.     Consults: PT, OT, Medicine, appreciate assistance in caring for this patient.             Future Appointments   Date Time Provider Department Center   9/16/2022  9:00 AM Amirah Gordon Pt, PT [facility] [place]   9/16/2022  1:00 PM Nova Royal, OT [facility] [place]   9/16/2022  3:15 PM Leighann Dykes, PT [facility] [place]   9/23/2022  2:00 PM Geraldo Appiah, PT [facility] [place]   9/27/2022 11:30 AM Geraldo Appiah, PT [facility] [place]   9/30/2022 10:50 AM Geraldo Appiah, PT [facility] [place]   10/4/2022 10:50 AM Geraldo Appiah, PT [facility] [place]   10/7/2022 10:50 AM Geraldo Appiah, PT [facility] [place]         Disposition: Pending progress with therapies, pain control on orals, and medical stability, anticipate discharge to home on POD #1-2.

## 2022-09-16 NOTE — PROGRESS NOTES
SPIRITUAL HEALTH SERVICES  SPIRITUAL ASSESSMENT Progress Note  [facility] ([facility]) 5 A ORTHO R 0515 9/16/22      REFERRAL SOURCE: Self Referral    Unit  introduced self and [facility] to Pt. Pt stated he was doing well and did not have any spiritual needs at the moment. He thanked  for the visit.     PLAN: Encouraged Pt to reach out to [facility] when he changes his mind.    Yunior Reyes MA, MPA  Associate   Pager: [phone]

## 2022-09-16 NOTE — PLAN OF CARE
VS: VSS overnight, pt denies chest pain.    O2: 1L via nasal cannula overnight   Output: Pt straight cathed at 0440 for 550.   Last BM: 9/14/22 per pt report   Activity: Ax1-2 with walker and GB.    Up for meals? N/A   Skin: L knee incision   Pain: Managed with scheduled Tylenol, PRN Oxycodone, and Ice packs.    CMS: Baseline numbness to bilateral feet per pt report. A&O x4   Dressing: LLE - CDI    Diet: Regular   LDA: PIV R hand - infusing   Equipment: IV pole/pump, walker, GB, and pt belongings.    Plan: TBD   Additional Info:

## 2022-09-16 NOTE — PROGRESS NOTES
[facility]    Medicine Progress Note - Hospitalist Service, GOLD TEAM 18    Date of Admission:  9/15/2022    Assessment & Plan          77 year old male with PMHx significant for HLD, HTN, BPH, CKD 3a, GERD, depression, and osteoarthritis. He presented to [facility] on 9/15 for L TKA that was performed without reported complications. IM was consulted for post-operative medical co-management.     # BPH  # Urinary retention   - Continue PTA Finasteride   - Persistent urinary retention, he had to be straight cath x 3.   - Start Flomax  - I recommended to place a iverson cath if patient is retaining later during the day. Pt should stay in the hospital overnight and make sure he is able to void before discharge or iverson cath was placed.      # Status post L TKA   No reported complications. EBL 17 ml.  - Postoperative cares including pain control, incisional care, IV fluids, diet, and VTE prophylaxis per surgical team team  - PT/OT to evaluate and treat  - Check CBC and CMP in the a.m.  - Med rec completed        # HTN   - Restart Valsartan 80 mg daily tomorrow   - Hydralazine 10 mg IV PRN for SBP > 180 or DBP > 110     # CKD stage 3  Most recent Cr ranging 1.0-1.1.  - Restart Valsartan tomorrow   - Recheck CMP in the AM      # HLD   - Continue Crestor      # BPH  - Continue PTA Finasteride      # GERD   - Continue PTA Nexium      # Anxiety and Depression   - Continue PTA Duloxetin 30 mg q daily       Diet: Advance Diet as Tolerated: Regular Diet Adult  Discharge Instruction - Regular Diet Adult    DVT Prophylaxis: Pneumatic Compression Devices and Anti-embolisim stockings (TEDs)  Iverson Catheter: Not present  Central Lines: None  Cardiac Monitoring: None  Code Status: Full Code      Disposition Plan      Expected Discharge Date: 09/16/2022                The patient's care was discussed with the Patient.    Harvey Coe MD  Hospitalist Service, GOLD TEAM 18  [facility]  [facility]  Securely message with the [employer] Web Console (learn more here)  Text page via [facility] Paging/Directory   Please see signed in provider for up to date coverage information    ______________________________________________________________________    Interval History     Patient developed acute urinary retention.   He was pleasant.   Denies fever, chills, nausea, vomit, abdominal pain, chest pain, palpitations or shortness of breath.     Data reviewed today: I reviewed all medications, new labs and imaging results over the last 24 hours. I personally reviewed no images or EKG's today.    Physical Exam   Vital Signs: Temp: (!) 96.5  F (35.8  C) Temp src: Oral BP: 123/63 Pulse: 85   Resp: 16 SpO2: 96 % O2 Device: None (Room air) Oxygen Delivery: 1 LPM  Weight: 196 lbs 6.88 oz  Constitutional: awake, alert, cooperative, in no acute distress. A&Ox3    ENT: Normocephalic, without obvious abnormality, atraumatic.   Respiratory: No increased work of breathing. Clear to auscultation bilaterally, no crackles or wheezing  Cardiovascular: RRR. No murmur or friction rub. No edema. No chest wall tenderness.  GI: Soft, non-tender without rebound or guarding. Bowel sounds are active.   Neurologic: Cranial nerves II-XII are grossly intact.   Neuropsychiatric: General: normal, calm and normal eye contact. Normal affect        Data   Recent Labs   Lab 09/16/22  0513 09/15/22  1144 09/15/22  0955   WBC  --  5.7  --    HGB 12.3* 13.6  --    MCV  --  85  --    PLT  --  160  --    GLC  --   --  88     Recent Results (from the past 24 hour(s))   XR Knee Port Left 1/2 Views    Narrative    EXAM: XR KNEE PORT LEFT 1/2 VIEWS  9/15/2022 4:23 PM      HISTORY: Post-Op Total Knee    COMPARISON: 8/5/2022    FINDINGS: Portable postoperative AP and crosstable lateral views of  the left knee.     New total left knee arthroplasty. Components appear well seated.  Surgical drain in place. Postsurgical  subcutaneous gas.      Impression    IMPRESSION: New total left knee arthroplasty.         ERIC ZARATE MD (Joe)         SYSTEM ID:  [id number]     Medications     lactated ringers 75 mL/hr at 09/15/22 1820       acetaminophen  975 mg Oral Q8H     aspirin  81 mg Oral BID     cholecalciferol  50,000 Units Oral Q7 Days     DULoxetine  30 mg Oral Daily     finasteride  5 mg Oral Daily     pantoprazole  40 mg Oral QAM AC     polyethylene glycol  17 g Oral Daily     rosuvastatin  10 mg Oral Daily     senna-docusate  1 tablet Oral BID     sodium chloride (PF)  3 mL Intracatheter Q8H     valsartan  80 mg Oral Daily

## 2022-09-16 NOTE — PROGRESS NOTES
Occupational Therapy: Orders received and acknowleged. Chart reviewed and discussed with care team.?Occupational Therapy not indicated at this time.?Per discussion with PT, patient has adequate support following procedure to promote ADL IND and functional mobility needs in home setting as his daughter that he lives with is reported to be an OT. Pt is appropriate for single rehab discipline to follow. Defer discharge recommendations to PT and medical team.?Will complete orders. Please re-order OT if further needs arise.

## 2022-09-16 NOTE — PLAN OF CARE
VS: VSS   O2: >90% RA   Output: Walsh cath inserted due to multiple straight caths over past 24 hours and PVR >500. Walsh patent   Last BM: 9/14/2022   Activity: 1 assist GB & walker   Skin: Surgical incision L knee   Pain: Using orals and IV dilaudid   CMS: Baseline N/T BLE   Dressing: L knee CDI   Diet: Regular   LDA: PIV RUE SL   Equipment: GB, Walker, personal belongings   Plan: Continue to monitor.   Additional Info:

## 2022-09-16 NOTE — PROGRESS NOTES
Care Management Discharge Note    Discharge Date: 09/16/2022       Discharge Disposition:  Home with outpatient physical therapy    Discharge Services:      Sep 23, 2022  2:00 PM   (Arrive by 1:45 PM)   Extremity Evaluation with Geraldo Appiah PT   [facility] ([facility] ) [address] [id number] [phone]     Discharge Transportation: family or friend will provide    Additional Information:  Outpatient physical therapy referral sent to [facility]. Patient has appointment scheduled. RNCC available as needed.    Monik Zaragoza RN, BSN  Care Coordinator, 5 Ortho  Phone [phone]  Pager [phone]

## 2022-09-17 ENCOUNTER — APPOINTMENT (OUTPATIENT)
Dept: PHYSICAL THERAPY | Facility: CLINIC | Age: 77
End: 2022-09-17
Attending: ORTHOPAEDIC SURGERY
Payer: COMMERCIAL

## 2022-09-17 VITALS
DIASTOLIC BLOOD PRESSURE: 70 MMHG | HEIGHT: 70 IN | WEIGHT: 196.43 LBS | OXYGEN SATURATION: 96 % | SYSTOLIC BLOOD PRESSURE: 139 MMHG | RESPIRATION RATE: 16 BRPM | TEMPERATURE: 97.1 F | BODY MASS INDEX: 28.12 KG/M2 | HEART RATE: 89 BPM

## 2022-09-17 LAB
HGB BLD-MCNC: 12.2 G/DL (ref 13.3–17.7)
HOLD SPECIMEN: NORMAL

## 2022-09-17 PROCEDURE — 85018 HEMOGLOBIN: CPT | Performed by: STUDENT IN AN ORGANIZED HEALTH CARE EDUCATION/TRAINING PROGRAM

## 2022-09-17 PROCEDURE — 36415 COLL VENOUS BLD VENIPUNCTURE: CPT | Performed by: STUDENT IN AN ORGANIZED HEALTH CARE EDUCATION/TRAINING PROGRAM

## 2022-09-17 PROCEDURE — 97116 GAIT TRAINING THERAPY: CPT | Mod: GP

## 2022-09-17 PROCEDURE — 99214 OFFICE O/P EST MOD 30 MIN: CPT | Performed by: INTERNAL MEDICINE

## 2022-09-17 PROCEDURE — 250N000013 HC RX MED GY IP 250 OP 250 PS 637: Performed by: STUDENT IN AN ORGANIZED HEALTH CARE EDUCATION/TRAINING PROGRAM

## 2022-09-17 PROCEDURE — 250N000013 HC RX MED GY IP 250 OP 250 PS 637: Performed by: INTERNAL MEDICINE

## 2022-09-17 PROCEDURE — 97110 THERAPEUTIC EXERCISES: CPT | Mod: GP

## 2022-09-17 PROCEDURE — 97530 THERAPEUTIC ACTIVITIES: CPT | Mod: GP

## 2022-09-17 RX ORDER — OXYCODONE HYDROCHLORIDE 5 MG/1
5 TABLET ORAL EVERY 6 HOURS PRN
Qty: 30 TABLET | Refills: 0 | Status: SHIPPED | OUTPATIENT
Start: 2022-09-17 | End: 2022-09-26

## 2022-09-17 RX ORDER — OXYCODONE HYDROCHLORIDE 5 MG/1
5-10 TABLET ORAL EVERY 4 HOURS PRN
Qty: 20 TABLET | Refills: 0 | Status: SHIPPED | OUTPATIENT
Start: 2022-09-17 | End: 2022-10-20

## 2022-09-17 RX ORDER — TAMSULOSIN HYDROCHLORIDE 0.4 MG/1
0.4 CAPSULE ORAL DAILY
Qty: 30 CAPSULE | Refills: 0 | Status: SHIPPED | OUTPATIENT
Start: 2022-09-18 | End: 2022-09-23

## 2022-09-17 RX ADMIN — OXYCODONE HYDROCHLORIDE 5 MG: 5 TABLET ORAL at 11:20

## 2022-09-17 RX ADMIN — VALSARTAN 80 MG: 80 TABLET, FILM COATED ORAL at 08:06

## 2022-09-17 RX ADMIN — ASPIRIN 81 MG: 81 TABLET, COATED ORAL at 08:06

## 2022-09-17 RX ADMIN — SENNOSIDES AND DOCUSATE SODIUM 1 TABLET: 8.6; 5 TABLET ORAL at 08:06

## 2022-09-17 RX ADMIN — POLYETHYLENE GLYCOL 3350 17 G: 17 POWDER, FOR SOLUTION ORAL at 08:07

## 2022-09-17 RX ADMIN — DULOXETINE HYDROCHLORIDE 30 MG: 30 CAPSULE, DELAYED RELEASE ORAL at 08:06

## 2022-09-17 RX ADMIN — ACETAMINOPHEN 975 MG: 325 TABLET, FILM COATED ORAL at 00:07

## 2022-09-17 RX ADMIN — PANTOPRAZOLE SODIUM 40 MG: 40 TABLET, DELAYED RELEASE ORAL at 08:04

## 2022-09-17 RX ADMIN — OXYCODONE HYDROCHLORIDE 5 MG: 5 TABLET ORAL at 08:19

## 2022-09-17 RX ADMIN — ROSUVASTATIN CALCIUM 10 MG: 10 TABLET, FILM COATED ORAL at 11:32

## 2022-09-17 RX ADMIN — ACETAMINOPHEN 975 MG: 325 TABLET, FILM COATED ORAL at 08:04

## 2022-09-17 RX ADMIN — FINASTERIDE 5 MG: 5 TABLET, FILM COATED ORAL at 08:04

## 2022-09-17 RX ADMIN — TAMSULOSIN HYDROCHLORIDE 0.4 MG: 0.4 CAPSULE ORAL at 08:04

## 2022-09-17 ASSESSMENT — ACTIVITIES OF DAILY LIVING (ADL)
ADLS_ACUITY_SCORE: 29
ADLS_ACUITY_SCORE: 28
ADLS_ACUITY_SCORE: 29
ADLS_ACUITY_SCORE: 28
ADLS_ACUITY_SCORE: 28
ADLS_ACUITY_SCORE: 29

## 2022-09-17 NOTE — PLAN OF CARE
Physical Therapy Discharge Summary    Reason for therapy discharge:    Discharged to home with outpatient therapy.    Progress towards therapy goal(s). See goals on Care Plan in [facility] electronic health record for goal details.  Goals met    Therapy recommendation(s):    Continued therapy is recommended.  Rationale/Recommendations:  progression of s/p TKA per protocol.

## 2022-09-17 NOTE — OP NOTE
Preop Dx: right knee tricompartmental OA   Postop DX: Same  Procedure:  Left TKA   Depuy Attune 9 cemented femur, 9/5 poly, 9 cemented tibia CR, 41 all poly patella L knee     Attending: Jennifer  Assist:  Wisam Carmona MD G4               Martha LUQUE    Indications for procedure: Patient with significant pain and disability left knee.  Images show tricompartmental arthrosis.     Findings at procedure: Depuy attune used left knee, CR.  41 all poly patella/ 9 femur/9 tibia CR/ 9-5 tibial poly L.   Arellano Dressing over drain.   mins.  No complications identified.    Procedure: After verbal and written consent, patient taken to room 05.  Sterile prep and drape to left LE and tourniquet after exsanguination. Midline incision followed by median parapatellar arthrotomy.  We did not rake/retract lateral epithelium.  Patella everted and severe arthrosis identified.  We elevated MCL complex off of medial tibial metaphysis carefully and include some bone with calle so that we had a full thickness closure at the end.  We dissected back to posteromedial corner.  We then removed some infrapatellar fat pad until we could see the anterolateral tibial margin well.  We were very careful to protect soft tissues/MCL/patellar tendon  Throughout the case.  We then excised ACL and release soft tissues from anterior femur/elevated with calle so we could visualize anterior distal femur and use this as reference. We then performed notch plasty and introduced reamer/intramedullary guide at 1 oclock.  We used simpulse lavage.  Guide placed, distal femoral cut 9 mm/5 degrees valgus performed.  % degrees external rotation  Distal guide placed then anterior/posterior/chamfer cuts placed and femur plugged with remnant. 9 cutting guide used with sulcus guide. Simpulse again used.  Tibial guide placed 9, bonny postero lateral corner/z retractor medial and tibia cut with extramedullary guide in place, 7 degrees posterior slope perpendicular to  longitudinal axis and emphasis to have neutral rotation.  9 tibial guide placed and pinned, cruciate tower and punch placed.  Tibial guide left in place, femoral trial placed and distal holes drilled.  This followed by placement 9/5 R poly trial.  Placed without difficulty.  This all after removing menisci / removing posterior femoral  Condylar osteophytes and fractional lengthening of tendon to popliteus.  With all trials in place, good ROM and no evidence of instability or lift off identified.  Knee placed in extension and patella everted.  We freshened the border of patella circumferentially with 15 blade and identified quad and patellar tendon insertions/origins.  There were protected.  We then used a kocher to stabilize patella and transected patella using recip saw carefully.  The remnant was uniformly 15 mm remnant.  We drilled three holes with guide and over-drilled without.  All components were removed after we placed patellar component and assessed adequate tracking.  We injected .25% marcaine into posterior capsule and some about the periphery of the wound..  We dried all surfaces and cemented tibial component  Micromuscleuy 9 cruciate stem CR in place and removed free cement.  We then placed the 9/5 poly in place followed by femoral component 9 cemented and removed free cement after impacting in place. Knee placed in full extension and 41 patellar component placed. All free cement removed.  We Then irrigated thoroughly.  Bovie used for hemostasis and marking rotation tibial component.  All  Free cement removed.  Knee taken through a full range of motion/stable and no issues identified.  Over a medium hemovac drain we closed synovium with 2-0 vicryl running.  Retinaculum closed with #1 vicryl in running fashion followed by 1 stratofix for water tight seal.  Deep subcutaneous fat closed with 2-0 vicryl in interrupted simple fashion.  Intermediate and superficial layers closed with 2-0 and 3-0 vicryl in simple  fashion and skin closed with 3-0 monocryl in a running subcuticular fashion.  Knee flexed and exofin applied, xeroform, alginate and tegaderm, sterile cast padding and bailey dressing.  No complications identified.  Taken to [facility] without issues. I spoke with family postop.      Martha LUQUE and Wisam FORREST helped with position/retraction and procedure.             This was Left knee tricompartmental arthrosis  Jesus Rodriguez MD

## 2022-09-17 NOTE — PROGRESS NOTES
[facility]    Medicine Progress Note - Hospitalist Service, GOLD TEAM 18    Date of Admission:  9/15/2022    Assessment & Plan          77 year old male with PMHx significant for HLD, HTN, BPH, CKD 3a, GERD, depression, and osteoarthritis. He presented to [facility] on 9/15 for L TKA that was performed without reported complications. IM was consulted for post-operative medical co-management.     # BPH  # Urinary retention   - Continue PTA Finasteride   - Continue on Flomax  - Iverson cath was placed last night. He will go home with iversno and have follow-up with Urology in the clinic.      # Status post L TKA   No reported complications. EBL 17 ml.  - Postoperative cares including pain control, incisional care, IV fluids, diet, and VTE prophylaxis per surgical team team  - HGB stable      # HTN   - Continue on Valsartan 80 mg daily  - Hydralazine 10 mg IV PRN for SBP > 180 or DBP > 110     # CKD stage 3  Most recent Cr ranging 1.0-1.1.  - Continue monitoring as outpatient     # HLD   - Continue Crestor      # GERD   - Continue PTA Nexium      # Anxiety and Depression   - Continue PTA Duloxetin 30 mg q daily       Diet: Advance Diet as Tolerated: Regular Diet Adult  Discharge Instruction - Regular Diet Adult    DVT Prophylaxis: Pneumatic Compression Devices and Anti-embolisim stockings (TEDs)  Iverson Catheter: PRESENT, indication:    Central Lines: None  Cardiac Monitoring: None  Code Status: Full Code      Disposition Plan      Expected Discharge Date: 09/17/2022        Discharge Comments: with Iverson        The patient's care was discussed with the Patient.    Harvey Coe MD  Hospitalist Service, GOLD TEAM 18  [facility]  Securely message with the Vocera Web Console (learn more here)  Text page via [employer] Paging/Directory   Please see signed in provider for up to date coverage  information    ______________________________________________________________________    Interval History     Walsh placed last night  Denies fever, chills, nausea, vomit, abdominal pain, chest pain, palpitations or shortness of breath.     Data reviewed today: I reviewed all medications, new labs and imaging results over the last 24 hours. I personally reviewed no images or EKG's today.    Physical Exam   Vital Signs: Temp: 97.1  F (36.2  C) Temp src: Oral BP: 139/70 Pulse: 89   Resp: 16 SpO2: 96 % O2 Device: None (Room air)    Weight: 196 lbs 6.88 oz  Constitutional: awake, alert, cooperative, in no acute distress. A&Ox3    ENT: Normocephalic, without obvious abnormality, atraumatic.   Respiratory: No increased work of breathing. Clear to auscultation bilaterally, no crackles or wheezing  Cardiovascular: RRR. No murmur or friction rub. No edema. No chest wall tenderness.  GI: Soft, non-tender without rebound or guarding. Bowel sounds are active.   Neurologic: Cranial nerves II-XII are grossly intact.   Neuropsychiatric: General: normal, calm and normal eye contact. Normal affect        Data   Recent Labs   Lab 09/17/22  0618 09/16/22  0513 09/15/22  1144 09/15/22  0955   WBC  --   --  5.7  --    HGB 12.2* 12.3* 13.6  --    MCV  --   --  85  --    PLT  --   --  160  --    GLC  --   --   --  88     No results found for this or any previous visit (from the past 24 hour(s)).  Medications     lactated ringers 75 mL/hr at 09/15/22 1820       acetaminophen  975 mg Oral Q8H     aspirin  81 mg Oral BID     cholecalciferol  50,000 Units Oral Q7 Days     DULoxetine  30 mg Oral Daily     finasteride  5 mg Oral Daily     pantoprazole  40 mg Oral QAM AC     polyethylene glycol  17 g Oral Daily     rosuvastatin  10 mg Oral Daily     senna-docusate  1 tablet Oral BID     sodium chloride (PF)  3 mL Intracatheter Q8H     tamsulosin  0.4 mg Oral Daily     valsartan  80 mg Oral Daily

## 2022-09-17 NOTE — PROGRESS NOTES
Orthopaedic Surgery Progress Note 09/17/2022    S: No events overnight. Pain well controlled. Denies numbness or tingling. Denies chest pain, SOB, nausea/vomiting. Tolerating some diet. Worked well with PT  O:  Temp: 97.1  F (36.2  C) Temp src: Oral BP: 139/70 Pulse: 89   Resp: 16 SpO2: 96 % O2 Device: None (Room air)      Exam:  Gen: No acute distress, resting comfortably in bed.  Resp: Non-labored breathing  MSK:    LLE:  - Dressings c/d/i  - SILT femoral/tibial/sural/saphenous/DP/SP nerves  - Fires Quad, TA, EHL, FHL, GaSC  - PT/DP pulses 2+, foot wwp    Output by Drain (mL) 09/15/22 0700 - 09/15/22 1459 09/15/22 1500 - 09/15/22 2259 09/15/22 2300 - 09/16/22 0659 09/16/22 0700 - 09/16/22 1459 09/16/22 1500 - 09/16/22 2259 09/16/22 2300 - 09/17/22 0659 09/17/22 0700 - 09/17/22 0914   Requested LDAs do not have output data documented.       Recent Labs   Lab 09/17/22  0618 09/16/22  0513 09/15/22  1144   WBC  --   --  5.7   HGB 12.2* 12.3* 13.6   PLT  --   --  160     Assessment/Plan: Jerry Miguel is a 77 year old male s/p Procedure(s):  ARTHROPLASTY, KNEE, TOTAL LEFT on 9/15/2022 with Dr. Rodriguez.      Activity:  Up with assist.  Weight bearing status: WBAT   Antibiotics: Cefazolin  x 24 hours.  Diet: Begin with clear fluids and progress diet as tolerated.   DVT prophylaxis: mechanical while in the hospital with ASA 162mg daily x 4 weeks.    Wound Care: Dressing change at bedside by Ortho on POD #1 to remove Arellano dressing. Leave Aquacel in place.   Drains: Document output per shift, discontinue when <30 cc/shift or at Ortho discretion.   Pain management: transition from IV to orals as tolerated.    X-rays: in PACU - complete   Physical Therapy:  ROM, ADL's.   Occupational Therapy: ADLs  Labs: Trend Hgb on POD #1, 2.  Cultures: none.   Consults: PT, OT, Medicine, appreciate assistance in caring for this patient.             Future Appointments   Date Time Provider Department Center   9/16/2022  9:00 AM Heidi  Amirah BLEVINS Pt, PT [facility]   9/16/2022  1:00 PM Nova Royal, OT [facility]   9/16/2022  3:15 PM Leighann Dykes, PT [facility]   9/23/2022  2:00 PM Geraldo Appiah, PT [facility]   9/27/2022 11:30 AM Geraldo Appiah, PT [facility]   9/30/2022 10:50 AM Geraldo Appiah, PT [facility]   10/4/2022 10:50 AM Geraldo Appiah, PT [facility]   10/7/2022 10:50 AM Geraldo Appiah, PT [facility]         Disposition: Home today    Nadia Irving MD  Adult Reconstructive Surgery Fellow  Department of Orthopaedic Surgery, [facility]

## 2022-09-17 NOTE — PHARMACY-ADMISSION MEDICATION HISTORY
Admission Medication History Completed by Pharmacy    See [facility] Admission Navigator for allergy information, preferred outpatient pharmacy, prior to admission medications and immunization status.     Medication History Sources:     Patient    Changes made to PTA medication list (reason):    Added: Tylenol PRN (per pt)    Deleted: None    Changed: all his creams to PRN, fluticasone nasal spray to PRN    Additional Information:    Takes the weekly vitamin D on Saturdays.    Prior to Admission medications    Medication Sig Last Dose Taking? Auth Provider Long Term End Date   acetaminophen (TYLENOL) 650 MG CR tablet Take 1,300 mg by mouth nightly as needed for mild pain Past Week at Unknown time Yes Unknown, Entered By History     DULoxetine (CYMBALTA) 30 MG capsule TAKE 1 CAPSULE BY MOUTH EVERY DAY 9/15/2022 at 0700 Yes Amaury Lyon MD Yes    esomeprazole (NEXIUM) 40 MG DR capsule Take 30-60 minutes before eating. 9/15/2022 at 0700 Yes Amaury Lyon MD     finasteride (PROSCAR) 5 MG tablet Take 1 tablet (5 mg) by mouth daily 9/15/2022 at 0700 Yes Amaury Lyon MD     fluticasone (VERAMYST) 27.5 MCG/SPRAY spray Spray 1 spray into both nostrils daily as needed Past Month at Unknown time Yes Reported, Patient     ketoconazole (NIZORAL) 2 % external cream Apply daily to face  Patient taking differently: Apply daily to face daily as needed for face rash Past Week at Unknown time Yes Jeny Hobbs PA-C     metroNIDAZOLE (METROCREAM) 0.75 % external cream Apply topically 2 times daily  Patient taking differently: Apply topically 2 times daily as needed (face rash) Past Week at Unknown time Yes Mounika Dhillon MD     rosuvastatin (CRESTOR) 10 MG tablet Take 1 tablet (10 mg) by mouth daily 9/14/2022 at 2000 Yes Amaury Lyon MD Yes    triamcinolone (KENALOG) 0.1 % external cream Apply sparingly daily for one week.  Patient taking differently: Apply sparingly daily for one  week as needed for rash Past Week at Unknown time Yes Jeny Hobbs PA-C     UNABLE TO FIND MEDICATION NAME: Herbal Digestive for IBS; takes 1 capsule daily as needed Past Week at Unknown time Yes Reported, Patient     valsartan (DIOVAN) 80 MG tablet Take 1 tablet (80 mg) by mouth daily 9/14/2022 at 0700 Yes Amaury Lyon MD Yes    VITAMIN D, CHOLECALCIFEROL, PO Take 400 Units by mouth daily Past Week at Unknown time Yes Reported, Patient     vitamin D3 (CHOLECALCIFEROL) 1.25 MG (11214 UT) capsule Take 1 capsule (50,000 Units) by mouth every 7 days Past Week at Unknown time Yes Jesus Rodriguez MD       Date completed: 09/16/22    Medication history completed by: Jeny Coyle, PharmD, BCPS

## 2022-09-17 NOTE — PLAN OF CARE
VS: Temp: (!) 96.4  F (35.8  C) Temp src: Oral BP: 126/76 Pulse: 89   Resp: 16 SpO2: 98 % O2 Device: None (Room air)       O2: 1005 on room air. No cough, denies SOB   Output: Via iverson catheter placed on 9/16 d/t urine retention   Last BM: 9/16   Activity: Up with 1 assist. Walker, gait belt   Skin: Intact. Knee incision to left knee.    Pain: Denies, states its a 1-2/10, dull, declining pharm intervention   CMS: Alert and oriented X4, quiet and pleasant   Dressing: Aquacel dressing to left knee incision   Diet: REG   LDA: PIV to right forearm, SL   Equipment: Walker, personal belongings   Plan: Was to discharge 9/16, retaining urine, iverson placed, to discharge with iverson possibly 9/17   Additional Info:

## 2022-09-17 NOTE — PLAN OF CARE
Goal Outcome Evaluation:    VS: stable   O2: Room air   Output: , indwelling cath   Last BM: 9/14/2022   Activity: Able to Walk with a gait belt as tolerated   Skin: WDL except Left knee arthoplasty   Pain: 5/10, pain med administered    CMS: intact   Dressing: CDI   Diet: Regular diet   LDA: Walsh cath-going home with pt   Plan: Pt discharge today 9/17/2022          DISCHARGE SUMMARY    Pt discharging to: home  Transportation: daughter  AVS given and discussed: yes  Stoplight Tool given and discussed: yes  Medications given: yes  Belongings returned: yes  Comments: Pt discharged at 1415. Has no further questions at this time.

## 2022-09-17 NOTE — PROGRESS NOTES
Brief cross cover note    Patient again found to be retaining 833cc.      Urinary retention, acute  BPH  Given need for multiple large volume straight caths, patient indicated for indwelling iverson.    - lidojet ordered for iverson insertion  - unless protracted hospital stay, leave iverson in on discharge  - continue flomax and finasteride  - will need urology follow up on discharge (saved in AVS)    Katie Isaac PA-C  [facility]  Contact information available via [facility] Paging/Directory

## 2022-09-18 NOTE — DISCHARGE SUMMARY
ORTHOPAEDIC SURGERY DISCHARGE SUMMARY     Date of Admission: 9/15/2022  Date of Discharge: 9/17/2022  2:13 PM  Disposition: Home  Staff Physician: No att. providers found  Primary Care Provider: Amaury Lyon      ADMISSION DIAGNOSIS:  Arthritis of knee [M17.10]    DISCHARGE DIAGNOSIS:  Arthritis of knee [M17.10]    PROCEDURES: Procedure(s):  ARTHROPLASTY, KNEE, TOTAL LEFT on 9/15/2022    BRIEF HISTORY:  Patient with significant pain and disability left knee.  Images show tricompartmental arthrosis. After a discussion of the risks, benefits, and alternatives of the above proposed procedure, they wished to proceed.     HOSPITAL COURSE:    The patient was admitted following the above listed procedures for pain control and rehabilitation. Jerry Miguel did well post-operatively. Medicine was consulted post operatively to aid in management of medical co-morbidities. The patient received routine nursing cares and at the time of discharge was medically stable. Vital signs were stable throughout admission. The patient is tolerating a regular diet and is voiding spontaneously. All PT/OT goals have been met for safe mobility. Pain is now controlled on oral medications which will be available on discharge. Stool softeners have been used while taking pain medications to help prevent constipation.Drain was removed prior to discharge Jerry Miguel is deemed medically safe to discharge on POD2.     Antibiotics:  Ancef given periop and 24 hours postop.   DVT prophylaxis:  ASA 162mg qD initiated after surgery and will be continued for 4 weeks.   PT Progress:  Has met PT/OT goals for safe mobility. PT recommending home   Pain Meds:  Weaned off all IV pain meds by discharge.  Inpatient Events: No significant events or complications.     PHYSICAL EXAM:    Gen: No acute distress, resting comfortably in bed.  Resp: Non-labored breathing  MSK:     LLE:  - Dressings c/d/i  - SILT femoral/tibial/sural/saphenous/DP/SP  nerves  - Fires Quad, TA, EHL, FHL, GaSC  - PT/DP pulses 2+, foot wwp    FOLLOWUP:    Future Appointments   Date Time Provider Department Center   9/23/2022  2:00 PM [facility]   9/27/2022 11:30 AM [facility]   9/30/2022 10:50 AM BloGeraldo chavarria, PT [facility] [place]   10/4/2022 10:50 AM BloemGeraldo ho, PT [facility] [place]   10/7/2022 10:50 AM [facility]       Orthopaedic Surgery appointments are at the [facility] [place] ([address] [place] [address]). Call [phone] to schedule a follow-up appointment at this location with your provider.     PLANNED DISCHARGE ORDERS:     DVT Prophylaxis: ASA 162mg every day x 4 weeks     Activity: AAT, WBAT     Wound Care: see Below      Discharge Medication List as of 9/17/2022  1:27 PM      START taking these medications    Details   acetaminophen (TYLENOL) 325 MG tablet Take 2 tablets (650 mg) by mouth every 4 hours as needed for other (mild pain), Disp-100 tablet, R-0, E-Prescribe      aspirin 81 MG EC tablet Take 1 tablet (81 mg) by mouth 2 times daily, Disp-60 tablet, R-0, E-Prescribe      senna-docusate (SENOKOT-S/PERICOLACE) 8.6-50 MG tablet Take 1-2 tablets by mouth 2 times daily Take while on oral narcotics to prevent or treat constipation., Disp-30 tablet, R-0, E-PrescribeWhile taking narcotics      tamsulosin (FLOMAX) 0.4 MG capsule Take 1 capsule (0.4 mg) by mouth daily, Disp-30 capsule, R-0, E-Prescribe         CONTINUE these medications which have CHANGED    Details   !! oxyCODONE (ROXICODONE) 5 MG tablet Take 1-2 tablets (5-10 mg) by mouth every 4 hours as needed for moderate to severe pain, Disp-20 tablet, R-0, Local Print      !! oxyCODONE (ROXICODONE) 5 MG tablet Take 1 tablet (5 mg) by mouth every 6 hours as needed for pain, Disp-30 tablet, R-0, E-Prescribe       !! - Potential duplicate medications found. Please discuss with provider.       CONTINUE these medications which have NOT CHANGED    Details   acetaminophen (TYLENOL) 650 MG CR tablet Take 1,300 mg by mouth nightly as needed for mild pain, Historical      DULoxetine (CYMBALTA) 30 MG capsule TAKE 1 CAPSULE BY MOUTH EVERY DAY, Disp-90 capsule, R-3, E-Prescribe      esomeprazole (NEXIUM) 40 MG DR capsule Take 30-60 minutes before eating., Disp-90 capsule, R-3, E-Prescribe      finasteride (PROSCAR) 5 MG tablet Take 1 tablet (5 mg) by mouth daily, Disp-90 tablet, R-3, E-Prescribe      fluticasone (VERAMYST) 27.5 MCG/SPRAY spray Spray 1 spray into both nostrils daily as needed, Historical      ketoconazole (NIZORAL) 2 % external cream Apply daily to faceDisp-60 g, [id number]      metroNIDAZOLE (METROCREAM) 0.75 % external cream Apply topically 2 times dailyDisp-45 g, [id number]      rosuvastatin (CRESTOR) 10 MG tablet Take 1 tablet (10 mg) by mouth daily, Disp-90 tablet, R-3, E-Prescribe      triamcinolone (KENALOG) 0.1 % external cream Apply sparingly daily for one week.Disp-15 g, [id number]      UNABLE TO FIND MEDICATION NAME: Herbal Digestive for IBS; takes 1 capsule daily as needed, Historical      valsartan (DIOVAN) 80 MG tablet Take 1 tablet (80 mg) by mouth daily, Disp-90 tablet, R-3, E-Prescribe      !! VITAMIN D, CHOLECALCIFEROL, PO Take 400 Units by mouth daily, Historical      !! vitamin D3 (CHOLECALCIFEROL) 1.25 MG (33724 UT) capsule Take 1 capsule (50,000 Units) by mouth every 7 days, Disp-12 capsule, R-0, E-Prescribe       !! - Potential duplicate medications found. Please discuss with provider.            Discharge Procedure Orders   Physical Therapy Referral   Referral Priority: Routine Referral Type: Rehab Therapy Physical Therapy   Number of Visits Requested: 1     Adult Urology  Referral   Standing Status: Future   Referral Priority: Routine Referral Type: Consultation   Requested Specialty: Urology   Number of Visits Requested: 1     Reason for your  "hospital stay   Order Comments: Left knee replacement surgery.     When to call - Contact Surgeon Team   Order Comments: You may experience symptoms that require follow-up before your scheduled appointment. Refer to the \"Stoplight Tool\" for instructions on when to contact your Surgeon Team if you are concerned about pain control, blood clots, constipation, or if you are unable to urinate.     When to call - Reach out to Urgent Care   Order Comments: If you are not able to reach your Surgeon Team and you need immediate care, go to the Orthopedic Walk-in Clinic or Urgent Care at your Surgeon's office.  Do NOT go to the Emergency Room unless you have shortness of breath, chest pain, or other signs of a medical emergency.     When to call - Reasons to Call 911   Order Comments: Call 911 immediately if you experience sudden-onset chest pain, arm weakness/numbness, slurred speech, or shortness of breath     Discharge Instruction - Breathing exercises   Order Comments: Perform breathing exercises using your Incentive Spirometer 10 times per hour while awake for 2 weeks.     Symptoms - Fever Management   Order Comments: A low grade fever can be expected after surgery.  Use acetaminophen (TYLENOL) as needed for fever management.  Contact your Surgeon Team if you have a fever greater than 101.5 F, chills, and/or night sweats.     Symptoms - Constipation management   Order Comments: Constipation (hard, dry bowel movements) is expected after surgery due to the combination of being less active, the anesthetic, and the opioid pain medication.  You can do the following to help reduce constipation:  ~  FLUIDS:  Drink clear liquids (water or Gatorade), or juice (apple/prune).  ~  DIET:  Eat a fiber rich diet.    ~  ACTIVITY:  Get up and move around several times a day.  Increase your activity as you are able.  MEDICATIONS:  Reduce the risk of constipation by starting medications before you are constipated.  You can take Miralax   (1 " packet as directed) and/or a stool softener (Senokot 1-2 tablets 1-2 times a day).  If you already have constipation and these medications are not working, you can get magnesium citrate and use as directed.  If you continue to have constipation you can try an over the counter suppository or enema.  Call your Surgeon Team if it has been greater than 3 days since your last bowel movement.     Symptoms - Reduced Urine Output   Order Comments: Changes in the amount of fluids you drank before and after surgery may result in problems urinating.  It is important to stay well-hydrated after surgery and drink plenty of water. If it has been greater than 8 hours since you have urinated despite drinking plenty of water, call your Surgeon Team.     Activity - Exercises to prevent blood clots   Order Comments: Unless otherwise directed by your Surgeon team, perform the following exercises at least three times per day for the first four weeks after surgery to prevent blood clots in your legs: 1) Point and flex your feet (Ankle Pumps), 2) Move your ankle around in big circles, 3) Wiggle your toes, 4) Walk, even for short distances, several times a day, will help decrease the risk of blood clots.     Order Specific Question Answer Comments   Is discharge order? Yes      Comfort and Pain Management - Pain after Surgery   Order Comments: Pain after surgery is normal and expected.  You will have some amount of pain for several weeks after surgery.  Your pain will improve with time.  There are several things you can do to help reduce your pain including: rest, ice, elevation, and using pain medications as needed. Contact your Surgeon Team if you have pain that persists or worsens after surgery despite rest, ice, elevation, and taking your medication(s) as prescribed. Contact your Surgeon Team if you have new numbness, tingling, or weakness in your operative extremity.     Comfort and Pain Management - Swelling after Surgery   Order  Comments: Swelling and/or bruising of the surgical extremity is common and may persist for several months after surgery. In addition to frequent icing and elevation, gentle compressive support with an ACE wrap or tubigrip may help with swelling. Apply compression regularly, removing at least twice daily to perform skin checks. Contact your Surgeon Team if your swelling increases and is NOT associated with an increase in your activity level, or if your swelling increases and is associated with redness and pain.     Comfort and Pain Management - Cold therapy   Order Comments: Ice can be used to control swelling and discomfort after surgery. Place a thin towel over your operative site and apply the ice pack overtop. Leave ice pack in place for 20 minutes, then remove for 20 minutes. Repeat this 20 minutes on/20 minutes off routine as often as tolerated.     Medication Instructions - Acetaminophen (TYLENOL) Instructions   Order Comments: You were discharged with acetaminophen (TYLENOL) for pain management after surgery. Acetaminophen most effectively manages pain symptoms when it is taken on a schedule without missing doses (every four, six, or eight hours). Your Provider will prescribe a safe daily dose between 3000 - 4000 mg.  Do NOT exceed this daily dose. Most patients use acetaminophen for pain control for the first four weeks after surgery.  You can wean from this medication as your pain decreases.     Medication Instructions - NSAID Instructions   Order Comments: You were discharged with an anti-inflammatory medication for pain management to use in combination with acetaminophen (TYLENOL) and the narcotic pain medication.  Take this medication exactly as directed.  You should only take one anti-inflammatory at a time.  Some common anti-inflammatories include: ibuprofen (ADVIL, MOTRIN), naproxen (ALEVE, NAPROSYN), celecoxib (CELEBREX), meloxicam (MOBIC), ketorolac (TORADOL).  Take this medication with food and  "water.     Medication Instructions - Opioids - Tapering Instructions   Order Comments: In the first three days following surgery, your symptoms may warrant use of the narcotic pain medication every four to six hours as prescribed. This is normal. As your pain symptoms improve, focus your efforts on decreasing (tapering) use of narcotic medications. The most successful tapering strategy is to first, decrease the number of tablets you take every 4-6 hours to the minimum prescribed. Then, increase the amount of time between doses.  For example:  First, taper to   or 1 tablet every 4-6 hours.  Then, taper to   or 1 tablet every 6-8 hours.  Then, taper to   or 1 tablet every 8-10 hours.  Then, taper to   or 1 tablet every 10-12 hours.  Then, taper to   or 1 tablet at bedtime.  The bedtime dose can help with comfort during sleep and is typically the last dose to be discontinued after surgery.     Follow Up Care   Order Comments: Follow-up with your Surgeon Team in 2 weeks for wound check.     Medication instructions -  Anticoagulation - aspirin   Order Comments: Take the aspirin as prescribed for a total of four weeks after surgery.  This is given to help minimize your risk of blood clot.     Comfort and Pain Management - LOWER Extremity Elevation   Order Comments: Swelling is expected for several months after surgery. This type of swelling is usually associated with gravity and activity, and can be improved with elevation.   The best way to do this is to get your \"toes above your nose\" by laying down and placing several pillows lengthwise under your calf and heel. When elevating your leg keep your knee completely straight. Perform this elevation as often as possible especially for the first two weeks after surgery.     Medication Instructions - Opioid Instructions (Greater than or equal to 65 years)   Order Comments: You were discharged with an opioid medication (hydromorphone, oxycodone, hydrocodone, or tramadol). This " medication should only be taken for breakthrough pain that is not controlled with acetaminophen (TYLENOL). If you rate your pain less than 3 you do not need this medication.  Pain rating 0-3:  You do not need this medication  Pain rating 4-6:  Take 1/2 tablet every 4-6 hours as needed  Pain rating 7-10:  Take 1 tablet every 4-6 hours as needed  Do not exceed 4 tablets per day     Activity - Total Knee Arthroplasty     Order Specific Question Answer Comments   Is discharge order? Yes      Return to Driving   Order Comments: Return to driving - Driving is NOT permitted until directed by your provider. Under no circumstance are you permitted to drive while using narcotic pain medications.     Order Specific Question Answer Comments   Is discharge order? Yes      Dressing / Wound Care - Wound   Order Comments: You have a clean dressing on your surgical wound. Dressing change instructions as follows: dressing will be removed at your follow-up appointment. Contact your Surgeon Team if you have increased redness, warmth around the surgical wound, and/or drainage from the surgical wound.     Dressing / Wound Care - NO Tub Bathing   Order Comments: Tub bathing, swimming, or any other activities that will cause your incision to be submerged in water should be avoided until allowed by your Surgeon.     Weight bearing as tolerated   Order Comments: Weight bearing as tolerated on your operative extremity.     Order Specific Question Answer Comments   Is discharge order? Yes      Dressing Wound Care - Shower with wound/dressing NOT covered   Order Comments: You do not need to cover your dressing or incision in the shower, you may allow water and soap to run over top of the surgical dressing. You may shower 2 days after surgery.  You are strictly prohibited from soaking or submerging the surgical wound underwater.     Crutches DME   Order Comments: DME Documentation: Describe the reason for need to support medical necessity:  Impaired gait status post knee surgery. I, the undersigned, certify that the above prescribed supplies are medically necessary for this patient and is both reasonable and necessary in reference to accepted standards of medical practice in the treatment of this patient's condition and is not prescribed as a convenience.     Order Specific Question Answer Comments   DME Provider: [employer]    Crutch Type: Standard    Crutches Add On: NA    Length of Need: Lifetime      Cane DME   Order Comments: DME Documentation: Describe the reason for need to support medical necessity: Impaired gait status post knee surgery. I, the undersigned, certify that the above prescribed supplies are medically necessary for this patient and is both reasonable and necessary in reference to accepted standards of medical practice in the treatment of this patient's condition and is not prescribed as a convenience.     Order Specific Question Answer Comments   DME Provider: [employer]    Cane Type: Single Tip    Reminder: Patient can typically get 1 every 5 years      Walker DME   Order Comments: DME Documentation: Describe the reason for need to support medical necessity: Impaired gait status post knee surgery. I, the undersigned, certify that the above prescribed supplies are medically necessary for this patient and is both reasonable and necessary in reference to accepted standards of medical practice in the treatment of this patient's condition and is not prescribed as a convenience.     Order Specific Question Answer Comments   DME Provider: [employer]    Walker Type: Standard (2 Wheel)    Accessories: N/A      Discharge Instruction - Regular Diet Adult   Order Comments: Return to your pre-surgery diet unless instructed otherwise     Order Specific Question Answer Comments   Is discharge order? Yes        Mayco Carmona MD  Orthopaedic Surgery PGY-4    I evaluated patient and record and have communicated with Resident.  I  concur with evaluation and treatment plan.  Jesus Rodriguez MD

## 2022-09-19 ENCOUNTER — TELEPHONE (OUTPATIENT)
Dept: UROLOGY | Facility: CLINIC | Age: 77
End: 2022-09-19

## 2022-09-19 NOTE — TELEPHONE ENCOUNTER
Per chart review:  9/15: Surgery date  9/16: PVR= 830, iverson placed  9/17: Pt discharged from hospital.    Needs OV and TOV with Dr. Echavarria.    Jadyn BRYAN RN Urology 9/19/2022 1:49 PM

## 2022-09-19 NOTE — TELEPHONE ENCOUNTER
[facility] Call Center    Phone Message    May a detailed message be left on voicemail: yes     Reason for Call: Patient calling schedule an appt for cath removal/retention post knee replacement. Please reach out. Thank you!    Action Taken: Message routed to:  [facility] ([facility] Uro    Travel Screening: Not Applicable

## 2022-09-23 ENCOUNTER — THERAPY VISIT (OUTPATIENT)
Dept: PHYSICAL THERAPY | Facility: CLINIC | Age: 77
End: 2022-09-23
Attending: ORTHOPAEDIC SURGERY
Payer: COMMERCIAL

## 2022-09-23 ENCOUNTER — OFFICE VISIT (OUTPATIENT)
Dept: UROLOGY | Facility: CLINIC | Age: 77
End: 2022-09-23
Payer: COMMERCIAL

## 2022-09-23 VITALS
HEART RATE: 110 BPM | OXYGEN SATURATION: 96 % | SYSTOLIC BLOOD PRESSURE: 131 MMHG | DIASTOLIC BLOOD PRESSURE: 78 MMHG | TEMPERATURE: 97.8 F

## 2022-09-23 DIAGNOSIS — N40.1 BENIGN PROSTATIC HYPERPLASIA WITH URINARY RETENTION: Primary | ICD-10-CM

## 2022-09-23 DIAGNOSIS — G89.29 CHRONIC PAIN OF LEFT KNEE: ICD-10-CM

## 2022-09-23 DIAGNOSIS — R33.9 URINARY RETENTION: ICD-10-CM

## 2022-09-23 DIAGNOSIS — M25.562 CHRONIC PAIN OF LEFT KNEE: ICD-10-CM

## 2022-09-23 DIAGNOSIS — R33.8 BENIGN PROSTATIC HYPERPLASIA WITH URINARY RETENTION: Primary | ICD-10-CM

## 2022-09-23 DIAGNOSIS — Z47.89 ORTHOPEDIC AFTERCARE: ICD-10-CM

## 2022-09-23 PROCEDURE — 51700 IRRIGATION OF BLADDER: CPT | Performed by: UROLOGY

## 2022-09-23 PROCEDURE — 99203 OFFICE O/P NEW LOW 30 MIN: CPT | Mod: 25 | Performed by: UROLOGY

## 2022-09-23 PROCEDURE — 97110 THERAPEUTIC EXERCISES: CPT | Mod: GP | Performed by: PHYSICAL THERAPIST

## 2022-09-23 PROCEDURE — 97161 PT EVAL LOW COMPLEX 20 MIN: CPT | Mod: GP | Performed by: PHYSICAL THERAPIST

## 2022-09-23 RX ORDER — TAMSULOSIN HYDROCHLORIDE 0.4 MG/1
0.4 CAPSULE ORAL DAILY
Qty: 90 CAPSULE | Refills: 3 | Status: SHIPPED | OUTPATIENT
Start: 2022-09-23 | End: 2022-10-20

## 2022-09-23 ASSESSMENT — ACTIVITIES OF DAILY LIVING (ADL)
GIVING WAY, BUCKLING OR SHIFTING OF KNEE: I DO NOT HAVE THE SYMPTOM
SIT WITH YOUR KNEE BENT: ACTIVITY IS VERY DIFFICULT
RISE FROM A CHAIR: ACTIVITY IS FAIRLY DIFFICULT
KNEEL ON THE FRONT OF YOUR KNEE: I AM UNABLE TO DO THE ACTIVITY
WALK: ACTIVITY IS VERY DIFFICULT
LIMPING: THE SYMPTOM AFFECTS MY ACTIVITY SEVERELY
WEAKNESS: THE SYMPTOM AFFECTS MY ACTIVITY SLIGHTLY
SWELLING: THE SYMPTOM AFFECTS MY ACTIVITY MODERATELY
GO UP STAIRS: ACTIVITY IS FAIRLY DIFFICULT
KNEE_ACTIVITY_OF_DAILY_LIVING_SCORE: 32.86
PAIN: THE SYMPTOM AFFECTS MY ACTIVITY SEVERELY
GO DOWN STAIRS: ACTIVITY IS FAIRLY DIFFICULT
STAND: ACTIVITY IS FAIRLY DIFFICULT
STIFFNESS: THE SYMPTOM AFFECTS MY ACTIVITY SEVERELY
AS_A_RESULT_OF_YOUR_KNEE_INJURY,_HOW_WOULD_YOU_RATE_YOUR_CURRENT_LEVEL_OF_DAILY_ACTIVITY?: ABNORMAL
HOW_WOULD_YOU_RATE_THE_CURRENT_FUNCTION_OF_YOUR_KNEE_DURING_YOUR_USUAL_DAILY_ACTIVITIES_ON_A_SCALE_FROM_0_TO_100_WITH_100_BEING_YOUR_LEVEL_OF_KNEE_FUNCTION_PRIOR_TO_YOUR_INJURY_AND_0_BEING_THE_INABILITY_TO_PERFORM_ANY_OF_YOUR_USUAL_DAILY_ACTIVITIES?: 50
KNEE_ACTIVITY_OF_DAILY_LIVING_SUM: 23
SQUAT: I AM UNABLE TO DO THE ACTIVITY
HOW_WOULD_YOU_RATE_THE_OVERALL_FUNCTION_OF_YOUR_KNEE_DURING_YOUR_USUAL_DAILY_ACTIVITIES?: ABNORMAL
RAW_SCORE: 23

## 2022-09-23 NOTE — PROGRESS NOTES
"Physical Therapy Initial Evaluation  Subjective:  The history is provided by the patient.   Therapist Generated HPI Evaluation  Problem details: Had surgery on 9/15/2022. Catheter removed this morning.  Taking oxycodone every 6 hours. Takes Tylenol in between oxycodone doses. Has an \"ice machine\" to keep knee iced. Has been elevating with pillows under leg in bed. HEP going well at home..         Type of problem:  Left knee.    This is a chronic condition.  Occurance: Surgery.      and is constant.      Associated symptoms:  Loss of motion/stiffness, loss of strength, numbness and edema. Symptoms are exacerbated by bending/squatting, descending stairs, ascending stairs, standing and walking  and relieved by NSAID's and rest.      Barriers include:  None as reported by patient.    Patient Health History  Jerry Miguel being seen for PT following surgery.          Pain is reported as 5/10 on pain scale.  General health as reported by patient is good.  Pertinent medical history includes: high blood pressure and osteoarthritis.   Red flags:  None as reported by patient.     Surgeries include:  Orthopedic surgery. Other surgery history details: 2 knees.    Current medications:  Anti-depressants, high blood pressure medication and pain medication.    Current occupation is Retired.   Primary job tasks include:  Other.   Other job/home tasks details: Cooking.                                  Objective:    Gait:  Ambulates with FWW with step through gait pattern. Slow and deliberate gait.                                                          Knee Evaluation:  ROM:    AROM      Extension:  Left: -9    Right:  0    PROM        Flexion: Left: 75   Right:       Strength:     Extension:  Left: 2/5   Pain:      Right: 5/5   Pain:  Flexion:  Left: 2/5   Pain:      Right: 5/5   Pain:    Quad Set Left: Fair    Pain:           Edema:  Edema of the knee: Mild to moderate edema of knee joint. No noticeable swelling of lower leg or " foot. No signs of infection noted around surgical site.            General     ROS    Assessment/Plan:    Patient is a 77 year old male with left side knee complaints.    Patient has the following significant findings with corresponding treatment plan.                Diagnosis 1:  Left knee pain (TKA)  Pain -  hot/cold therapy, manual therapy, self management, education and home program  Decreased ROM/flexibility - manual therapy, therapeutic exercise, therapeutic activity and home program  Decreased joint mobility - manual therapy, therapeutic exercise, therapeutic activity and home program  Decreased strength - therapeutic exercise, therapeutic activities and home program  Impaired balance - neuro re-education, gait training, therapeutic activities, adaptive equipment/assistive device and home program  Impaired gait - gait training, assistive devices and home program  Decreased function - therapeutic activities and home program    Therapy Evaluation Codes:     Cumulative Therapy Evaluation is: Low complexity.    Previous and current functional limitations:  (See Goal Flow Sheet for this information)    Short term and Long term goals: (See Goal Flow Sheet for this information)     Communication ability:  Patient appears to be able to clearly communicate and understand verbal and written communication and follow directions correctly.  Treatment Explanation - The following has been discussed with the patient:   RX ordered/plan of care  Anticipated outcomes  Possible risks and side effects  This patient would benefit from PT intervention to resume normal activities.   Rehab potential is good.    Frequency:  2 X week for 3 weeks then 1 time per week for 5 weeks, once daily  Duration:  for 8 weeks  Discharge Plan:  Achieve all LTG.  Independent in home treatment program.  Reach maximal therapeutic benefit.    Please refer to the daily flowsheet for treatment today, total treatment time and time spent performing 1:1  timed codes.

## 2022-09-23 NOTE — PROGRESS NOTES
TOV performed. 320ml sterile water instilled into bladder through existing iverson catheter. Balloon deflated and catheter removed without problem. Patient urinates 320ml ml clear urine into uripan. Patient to return to clinic or seek medical attention if not able to urinate.

## 2022-09-23 NOTE — PROGRESS NOTES
S: Jerry Miguel is a pleasant  77 year old male who was requested to be seen by Dr. Coe for a consult with regard to patient's urinary complaints.  Patient complains of urinary retention after recent knee surgery.  He has no history of elevated PSA. .  His recent PSA was found to be   PSA   Date Value Ref Range Status   11/21/2019 1.23 0 - 4 ug/L Final     Comment:     Assay Method:  Chemiluminescence using Siemens Vista analyzer     He has mild LUTs prior to surgery and was on proscar.  He was placed on flomax recently.  Current Outpatient Medications   Medication Sig Dispense Refill     acetaminophen (TYLENOL) 325 MG tablet Take 2 tablets (650 mg) by mouth every 4 hours as needed for other (mild pain) 100 tablet 0     acetaminophen (TYLENOL) 650 MG CR tablet Take 1,300 mg by mouth nightly as needed for mild pain       aspirin 81 MG EC tablet Take 1 tablet (81 mg) by mouth 2 times daily 60 tablet 0     DULoxetine (CYMBALTA) 30 MG capsule TAKE 1 CAPSULE BY MOUTH EVERY DAY 90 capsule 3     esomeprazole (NEXIUM) 40 MG DR capsule Take 30-60 minutes before eating. 90 capsule 3     finasteride (PROSCAR) 5 MG tablet Take 1 tablet (5 mg) by mouth daily 90 tablet 3     fluticasone (VERAMYST) 27.5 MCG/SPRAY spray Spray 1 spray into both nostrils daily as needed       ketoconazole (NIZORAL) 2 % external cream Apply daily to face (Patient taking differently: Apply daily to face daily as needed for face rash) 60 g 4     metroNIDAZOLE (METROCREAM) 0.75 % external cream Apply topically 2 times daily (Patient taking differently: Apply topically 2 times daily as needed (face rash)) 45 g 1     oxyCODONE (ROXICODONE) 5 MG tablet Take 1-2 tablets (5-10 mg) by mouth every 4 hours as needed for moderate to severe pain 20 tablet 0     oxyCODONE (ROXICODONE) 5 MG tablet Take 1 tablet (5 mg) by mouth every 6 hours as needed for pain 30 tablet 0     rosuvastatin (CRESTOR) 10 MG tablet Take 1 tablet (10 mg) by mouth daily 90  tablet 3     senna-docusate (SENOKOT-S/PERICOLACE) 8.6-50 MG tablet Take 1-2 tablets by mouth 2 times daily Take while on oral narcotics to prevent or treat constipation. 30 tablet 0     tamsulosin (FLOMAX) 0.4 MG capsule Take 1 capsule (0.4 mg) by mouth daily 90 capsule 3     triamcinolone (KENALOG) 0.1 % external cream Apply sparingly daily for one week. (Patient taking differently: Apply sparingly daily for one week as needed for rash) 15 g 4     UNABLE TO FIND MEDICATION NAME: Herbal Digestive for IBS; takes 1 capsule daily as needed       valsartan (DIOVAN) 80 MG tablet Take 1 tablet (80 mg) by mouth daily 90 tablet 3     VITAMIN D, CHOLECALCIFEROL, PO Take 400 Units by mouth daily       vitamin D3 (CHOLECALCIFEROL) 1.25 MG (33496 UT) capsule Take 1 capsule (50,000 Units) by mouth every 7 days 12 capsule 0     Allergies   Allergen Reactions     Codeine Sulfate Nausea and Vomiting     Past Medical History:   Diagnosis Date     Arthritis     knee     BPH (benign prostatic hyperplasia)      Chronic prostatitis      Gastro-oesophageal reflux disease      Hypertension      Kidney stone      Shingles      Thyroid disease 2001-about    h/o hyperthyroid,treated, now normal     Past Surgical History:   Procedure Laterality Date     APPENDECTOMY       ARTHROPLASTY KNEE Right 1/4/2016    Procedure: ARTHROPLASTY KNEE;  Surgeon: Manuel Ponce MD;  Location: [facility]     ARTHROPLASTY KNEE Left 9/15/2022    Procedure: ARTHROPLASTY, KNEE, TOTAL LEFT;  Surgeon: Jesus Rodriguez MD;  Location: [facility] OR     COLONOSCOPY       COLONOSCOPY WITH CO2 INSUFFLATION N/A 3/2/2018    Procedure: COLONOSCOPY WITH CO2 INSUFFLATION;  Colonoscopy, Constipation, unspecified constipation type Recent change in frequency of bowel movements, [place], BMI 31.52, [facility];  Surgeon: Edith Craig MD;  Location: [facility] OR     ENT SURGERY      tonsillectomy     LASER HOLMIUM LITHOTRIPSY URETER(S), INSERT STENT, COMBINED  10/11/2012     Procedure: COMBINED CYSTOSCOPY, URETEROSCOPY, LASER HOLMIUM LITHOTRIPSY URETER(S), INSERT STENT;  CYSTOSCOPY, ATTEMPTED RIGHT URETEROSCOPY, INSERT RIGHT URETERAL STENT, [facility] [place];  Surgeon: Petar Ulrich MD;  Location: [facility]      Family History   Problem Relation Age of Onset     Cancer Mother         ovarian     Cancer Father         lung     Heart Disease Father         possibly     Cancer Paternal Grandfather         esophageal cancer     Cancer Brother 68        prostate cancer     Diabetes Maternal Grandmother      Breast Cancer No family hx of      Cancer - colorectal No family hx of      Coronary Artery Disease No family hx of      Hypertension No family hx of      Hyperlipidemia No family hx of      Cerebrovascular Disease No family hx of      Colon Cancer No family hx of      Prostate Cancer No family hx of      Other Cancer No family hx of      Depression No family hx of      Anxiety Disorder No family hx of      Mental Illness No family hx of      Substance Abuse No family hx of      Anesthesia Reaction No family hx of      Asthma No family hx of      Osteoporosis No family hx of      Genetic Disorder No family hx of      Thyroid Disease No family hx of      Obesity No family hx of      Unknown/Adopted No family hx of      Macular Degeneration No family hx of      Glaucoma No family hx of      He does not have a family history of prostate cancer.  Social History     Socioeconomic History     Marital status:      Spouse name: None     Number of children: None     Years of education: None     Highest education level: None   Tobacco Use     Smoking status: Former Smoker     Packs/day: 1.00     Years: 10.00     Pack years: 10.00     Types: Cigarettes     Quit date: 1973     Years since quittin.7     Smokeless tobacco: Never Used   Substance and Sexual Activity     Alcohol use: Not Currently     Comment: 3x week     Drug use: No     Sexual activity: Yes     Partners: Female   Other  Topics Concern     Parent/sibling w/ CABG, MI or angioplasty before 65F 55M? No        REVIEW OF SYSTEMS  =================  C: NEGATIVE for fever, chills, change in weight  I: NEGATIVE for worrisome rashes, moles or lesions  E/M: NEGATIVE for ear, mouth and throat problems  R: NEGATIVE for significant cough or SHORTNESS OF BREATH  CV:  NEGATIVE for chest pain, palpitations or peripheral edema  GI: NEGATIVE for nausea, abdominal pain, heartburn, or change in bowel habits  NEURO: NEGATIVE numbness/weakness  : see HPI  PSYCH: NEGATIVE depression/anxiety  LYmph: no new enlarged lymph nodes  Ortho: no new trauma/movements           O: Exam:/78 (BP Location: Right arm, Patient Position: Chair, Cuff Size: Adult Regular)   Pulse 110   Temp 97.8  F (36.6  C)   SpO2 96%    Constitutional: healthy, alert and no distress  Cardiovascular: negative, PMI normal.   Respiratory: negative, no evidence of respiratory distress  : Walsh in placed.  Clear urine.  Musculoskeletal: extremities normal- no gross deformities noted, gait normal and normal muscle tone  Skin: no suspicious lesions or rashes  Neurologic: Alert and oriented  Psychiatric: mentation appears normal. and affect normal/bright  Hematologic/Lymphatic/Immunologic: normal ant/post cervical, axillary, supraclavicular and inguinal nodes    Assessment/Plan:   (N40.1,  R33.8) Benign prostatic hyperplasia with urinary retention  (primary encounter diagnosis)  Comment:    Plan: cont with flomax and proscar    (R33.9) Urinary retention  Comment:  350 ml placed and he voided out completely  Plan: tamsulosin (FLOMAX) 0.4 MG capsule        Pass trial of void        RTC prn.

## 2022-09-23 NOTE — PROGRESS NOTES
[facility]    OUTPATIENT Physical Therapy ORTHOPEDIC EVALUATION  PLAN OF TREATMENT FOR OUTPATIENT REHABILITATION  (COMPLETE FOR INITIAL CLAIMS ONLY)  Patient's Last Name, First Name, M.I.  YOB: 1945  Jerry Miguel    Provider s Name:  [facility]   Medical Record No.  [id number]   Start of Care Date:  09/23/22   Onset Date:  09/15/22    Type:     _X__PT   ___OT Medical Diagnosis:    Encounter Diagnosis   Name Primary?    Orthopedic aftercare         Treatment Diagnosis:  Left knee pain        Goals:     09/23/22 0500   Body Part   Goals listed below are for Knee   Goal #1   Goal #1 ambulation   Previous Functional Level No restrictions   Current Functional Level Minutes patient can walk   Performance Level 5 with FWW   STG Target Performance Minutes patient will be able to walk;with cane   Performance Level 15   Rationale for safe household ambulation;for safe outdoor household ambulation;for safe community ambulation   Due Date 10/14/22    LTG Target Performance Minutes patient will be able to  walk;without assistive device   Performance Level 20 with 1-2/10 pain   Rationale for safe household ambulation;for safe outdoor household ambulation;for safe community ambulation   Goal #2   Goal #2 stairs   Previous Functional Level No restrictions   Current Functional Level Ascend/descend stairs;one step at a time;with a railing   Performance Level 5/10 pain   STG Target Performance Ascend/descend stairs;one step at a time;with a railing   Performance Level 3/10 pain   Rationale to enter/leave the house safely   Due Date 10/14/22   LTG Target Performance Ascend/descend stairs;in a normal reciprocal pattern;with railing   Performance Level 1-2/10 pain   Rationale to enter/leave the house safely   Due Date 11/18/22       Therapy Frequency:  2 times per week for 3 weeks  then 1 time per week for 5 weeks  Predicted Duration of Therapy Intervention:  8 weeks    Geraldo Appiah, PT                 I CERTIFY THE NEED FOR THESE SERVICES FURNISHED UNDER        THIS PLAN OF TREATMENT AND WHILE UNDER MY CARE     (Physician attestation of this document indicates review and certification of the therapy plan).                     Certification Date From:  09/23/22   Certification Date To:  11/18/22    Referring Provider:  Jesus Rodriguez    Initial Assessment        See Epic Evaluation SOC Date: 09/23/22

## 2022-09-27 ENCOUNTER — THERAPY VISIT (OUTPATIENT)
Dept: PHYSICAL THERAPY | Facility: CLINIC | Age: 77
End: 2022-09-27
Payer: COMMERCIAL

## 2022-09-27 DIAGNOSIS — M25.562 CHRONIC PAIN OF LEFT KNEE: Primary | ICD-10-CM

## 2022-09-27 DIAGNOSIS — G89.29 CHRONIC PAIN OF LEFT KNEE: Primary | ICD-10-CM

## 2022-09-27 PROCEDURE — 97110 THERAPEUTIC EXERCISES: CPT | Mod: GP | Performed by: PHYSICAL THERAPIST

## 2022-09-30 ENCOUNTER — OFFICE VISIT (OUTPATIENT)
Dept: ORTHOPEDICS | Facility: CLINIC | Age: 77
End: 2022-09-30
Payer: COMMERCIAL

## 2022-09-30 ENCOUNTER — THERAPY VISIT (OUTPATIENT)
Dept: PHYSICAL THERAPY | Facility: CLINIC | Age: 77
End: 2022-09-30
Payer: COMMERCIAL

## 2022-09-30 DIAGNOSIS — Z96.652 STATUS POST TOTAL LEFT KNEE REPLACEMENT: Primary | ICD-10-CM

## 2022-09-30 DIAGNOSIS — M25.562 LEFT KNEE PAIN: Primary | ICD-10-CM

## 2022-09-30 PROCEDURE — 99024 POSTOP FOLLOW-UP VISIT: CPT | Performed by: PHYSICIAN ASSISTANT

## 2022-09-30 PROCEDURE — 97110 THERAPEUTIC EXERCISES: CPT | Mod: GP | Performed by: PHYSICAL THERAPIST

## 2022-09-30 NOTE — PROGRESS NOTES
ASSESSMENT/PLAN:  Jerry Miguel is a 77 year old who is status post left TKA with Dr. Rodriguez on 9/15/22.    Basic incision care was performed and discussed. We reviewed recommendations for bathing and hygiene including refraining from soaking in a tub or a pool. He will start daily cleansing with cotton ball soaked in betadine which he already has purchased. He was instructed to notify clinic of any redness or drainage associated with the incision.     Continue PT, currently going in [place].     The patient will see Dr. Abreu at six to eight weeks post op. Jerry has our clinic number and will call with any questions or concerns.    Martha Quinn PA-C  Orthopaedic Surgery    _________________________________    HISTORY OF PRESENT ILLNESS:  Jerry Miguel is a 77 year old male who is approximately 2 weeks status post the above procedure. Doing well.     Weight bearing status/devices: FWB with cane prn.   Pain level and management: pain is 3/10, currently using APAP and oxycodone prn.   Physical therapy & ROM: [place], [facility]     The patient endorses swelling around the surgical incision but denies surrounding redness. The incision has been dry, without discharge or drainage. Jerry denies recent fevers and chills, as well as any other symptoms concerning for infection.     Reports no issues with urinary retention upon iverson catheter removal.     DVT prophylaxis: 162mg ASA daily x 4 weeks.   Patient denies calf pain or tenderness.      MEDICATIONS:   Current Outpatient Rx   Medication Sig Dispense Refill     acetaminophen (TYLENOL) 325 MG tablet Take 2 tablets (650 mg) by mouth every 4 hours as needed for other (mild pain) 100 tablet 0     acetaminophen (TYLENOL) 650 MG CR tablet Take 1,300 mg by mouth nightly as needed for mild pain       aspirin 81 MG EC tablet Take 1 tablet (81 mg) by mouth 2 times daily 60 tablet 0     DULoxetine (CYMBALTA) 30 MG capsule TAKE 1 CAPSULE BY MOUTH EVERY DAY 90 capsule 3      esomeprazole (NEXIUM) 40 MG DR capsule Take 30-60 minutes before eating. 90 capsule 3     finasteride (PROSCAR) 5 MG tablet Take 1 tablet (5 mg) by mouth daily 90 tablet 3     fluticasone (VERAMYST) 27.5 MCG/SPRAY spray Spray 1 spray into both nostrils daily as needed       ketoconazole (NIZORAL) 2 % external cream Apply daily to face (Patient taking differently: Apply daily to face daily as needed for face rash) 60 g 4     metroNIDAZOLE (METROCREAM) 0.75 % external cream Apply topically 2 times daily (Patient taking differently: Apply topically 2 times daily as needed (face rash)) 45 g 1     oxyCODONE (ROXICODONE) 5 MG tablet Take 1 tablet (5 mg) by mouth every 6 hours as needed for pain 25 tablet 0     oxyCODONE (ROXICODONE) 5 MG tablet Take 1-2 tablets (5-10 mg) by mouth every 4 hours as needed for moderate to severe pain 20 tablet 0     rosuvastatin (CRESTOR) 10 MG tablet Take 1 tablet (10 mg) by mouth daily 90 tablet 3     senna-docusate (SENOKOT-S/PERICOLACE) 8.6-50 MG tablet Take 1-2 tablets by mouth 2 times daily Take while on oral narcotics to prevent or treat constipation. 30 tablet 0     tamsulosin (FLOMAX) 0.4 MG capsule Take 1 capsule (0.4 mg) by mouth daily 90 capsule 3     triamcinolone (KENALOG) 0.1 % external cream Apply sparingly daily for one week. (Patient taking differently: Apply sparingly daily for one week as needed for rash) 15 g 4     UNABLE TO FIND MEDICATION NAME: Herbal Digestive for IBS; takes 1 capsule daily as needed       valsartan (DIOVAN) 80 MG tablet Take 1 tablet (80 mg) by mouth daily 90 tablet 3     VITAMIN D, CHOLECALCIFEROL, PO Take 400 Units by mouth daily       vitamin D3 (CHOLECALCIFEROL) 1.25 MG (49072 UT) capsule Take 1 capsule (50,000 Units) by mouth every 7 days 12 capsule 0         ALLERGIES: Codeine sulfate       PHYSICAL EXAMINATION:   On physical examination the patient is comfortable and is in no acute distress. The affect is appropriate and breathing is  non-labored.    Surgical wound: The surgical wound was exposed and found to be clean and dry, without drainage or discharge. There is mild edema around the incision. There is no erythema. The skin was appropriately warm to touch.     ROM: 0-90  Isometric activation of the quadriceps: in tact  SLR: in tact  Gait: ambulating independently.     Motor intact distally throughout the tibial and peroneal nerve distributions, 5/5 strength with tibialis anterior, gastrocnemius and soleus, EHL, FHL firing  Sensation intact to light touch throughout superficial peroneal, deep peroneal, tibial, saphenous, and sural nerves  Dorsalis pedis and posterior tibial pulses palpable, toes warm and well-perfused

## 2022-09-30 NOTE — LETTER
9/30/2022         RE: Jerry Miguel  [address] [facility] [address]        Dear Colleague,    Thank you for referring your patient, Jerry Miguel, to the [facility] [place]. Please see a copy of my visit note below.    ASSESSMENT/PLAN:  Jerry Miguel is a 77 year old who is status post left TKA with Dr. Rodriguez on 9/15/22.    Basic incision care was performed and discussed. We reviewed recommendations for bathing and hygiene including refraining from soaking in a tub or a pool. He will start daily cleansing with cotton ball soaked in betadine which he already has purchased. He was instructed to notify clinic of any redness or drainage associated with the incision.     Continue PT, currently going in [place].     The patient will see Dr. Abreu at six to eight weeks post op. Jerry has our clinic number and will call with any questions or concerns.    Martha Quinn PA-C  Orthopaedic Surgery    _________________________________    HISTORY OF PRESENT ILLNESS:  Jerry Miguel is a 77 year old male who is approximately 2 weeks status post the above procedure. Doing well.     Weight bearing status/devices: FWB with cane prn.   Pain level and management: pain is 3/10, currently using APAP and oxycodone prn.   Physical therapy & ROM: [place], [facility]     The patient endorses swelling around the surgical incision but denies surrounding redness. The incision has been dry, without discharge or drainage. Jerry denies recent fevers and chills, as well as any other symptoms concerning for infection.     Reports no issues with urinary retention upon iverson catheter removal.     DVT prophylaxis: 162mg ASA daily x 4 weeks.   Patient denies calf pain or tenderness.      MEDICATIONS:   Current Outpatient Rx   Medication Sig Dispense Refill     acetaminophen (TYLENOL) 325 MG tablet Take 2 tablets (650 mg) by mouth every 4 hours as needed for other (mild pain) 100 tablet 0     acetaminophen  (TYLENOL) 650 MG CR tablet Take 1,300 mg by mouth nightly as needed for mild pain       aspirin 81 MG EC tablet Take 1 tablet (81 mg) by mouth 2 times daily 60 tablet 0     DULoxetine (CYMBALTA) 30 MG capsule TAKE 1 CAPSULE BY MOUTH EVERY DAY 90 capsule 3     esomeprazole (NEXIUM) 40 MG DR capsule Take 30-60 minutes before eating. 90 capsule 3     finasteride (PROSCAR) 5 MG tablet Take 1 tablet (5 mg) by mouth daily 90 tablet 3     fluticasone (VERAMYST) 27.5 MCG/SPRAY spray Spray 1 spray into both nostrils daily as needed       ketoconazole (NIZORAL) 2 % external cream Apply daily to face (Patient taking differently: Apply daily to face daily as needed for face rash) 60 g 4     metroNIDAZOLE (METROCREAM) 0.75 % external cream Apply topically 2 times daily (Patient taking differently: Apply topically 2 times daily as needed (face rash)) 45 g 1     oxyCODONE (ROXICODONE) 5 MG tablet Take 1 tablet (5 mg) by mouth every 6 hours as needed for pain 25 tablet 0     oxyCODONE (ROXICODONE) 5 MG tablet Take 1-2 tablets (5-10 mg) by mouth every 4 hours as needed for moderate to severe pain 20 tablet 0     rosuvastatin (CRESTOR) 10 MG tablet Take 1 tablet (10 mg) by mouth daily 90 tablet 3     senna-docusate (SENOKOT-S/PERICOLACE) 8.6-50 MG tablet Take 1-2 tablets by mouth 2 times daily Take while on oral narcotics to prevent or treat constipation. 30 tablet 0     tamsulosin (FLOMAX) 0.4 MG capsule Take 1 capsule (0.4 mg) by mouth daily 90 capsule 3     triamcinolone (KENALOG) 0.1 % external cream Apply sparingly daily for one week. (Patient taking differently: Apply sparingly daily for one week as needed for rash) 15 g 4     UNABLE TO FIND MEDICATION NAME: Herbal Digestive for IBS; takes 1 capsule daily as needed       valsartan (DIOVAN) 80 MG tablet Take 1 tablet (80 mg) by mouth daily 90 tablet 3     VITAMIN D, CHOLECALCIFEROL, PO Take 400 Units by mouth daily       vitamin D3 (CHOLECALCIFEROL) 1.25 MG (77849 UT) capsule  Take 1 capsule (50,000 Units) by mouth every 7 days 12 capsule 0         ALLERGIES: Codeine sulfate       PHYSICAL EXAMINATION:   On physical examination the patient is comfortable and is in no acute distress. The affect is appropriate and breathing is non-labored.    Surgical wound: The surgical wound was exposed and found to be clean and dry, without drainage or discharge. There is mild edema around the incision. There is no erythema. The skin was appropriately warm to touch.     ROM: 0-90  Isometric activation of the quadriceps: in tact  SLR: in tact  Gait: ambulating independently.     Motor intact distally throughout the tibial and peroneal nerve distributions, 5/5 strength with tibialis anterior, gastrocnemius and soleus, EHL, FHL firing  Sensation intact to light touch throughout superficial peroneal, deep peroneal, tibial, saphenous, and sural nerves  Dorsalis pedis and posterior tibial pulses palpable, toes warm and well-perfused

## 2022-09-30 NOTE — NURSING NOTE
Reason For Visit:   Chief Complaint   Patient presents with     Surgical Followup     2 week pop DOS: 9/15/22 Left TKA with Dr. Rodriguez        There were no vitals taken for this visit.    Pain Assessment  Patient Currently in Pain: Yes  0-10 Pain Scale: 3  Primary Pain Location: Knee (Left)  Alleviating Factors: Rest, Pain medication  Aggravating Factors: Exercise    Oksana Perez ATC

## 2022-10-04 ENCOUNTER — THERAPY VISIT (OUTPATIENT)
Dept: PHYSICAL THERAPY | Facility: CLINIC | Age: 77
End: 2022-10-04
Payer: COMMERCIAL

## 2022-10-04 DIAGNOSIS — M25.562 LEFT KNEE PAIN: Primary | ICD-10-CM

## 2022-10-04 PROCEDURE — 97110 THERAPEUTIC EXERCISES: CPT | Mod: GP | Performed by: PHYSICAL THERAPIST

## 2022-10-07 ENCOUNTER — THERAPY VISIT (OUTPATIENT)
Dept: PHYSICAL THERAPY | Facility: CLINIC | Age: 77
End: 2022-10-07
Payer: COMMERCIAL

## 2022-10-07 DIAGNOSIS — M25.562 LEFT KNEE PAIN: Primary | ICD-10-CM

## 2022-10-07 PROCEDURE — 97110 THERAPEUTIC EXERCISES: CPT | Mod: GP | Performed by: PHYSICAL THERAPIST

## 2022-10-11 ENCOUNTER — THERAPY VISIT (OUTPATIENT)
Dept: PHYSICAL THERAPY | Facility: CLINIC | Age: 77
End: 2022-10-11
Payer: COMMERCIAL

## 2022-10-11 DIAGNOSIS — M25.562 CHRONIC PAIN OF LEFT KNEE: Primary | ICD-10-CM

## 2022-10-11 DIAGNOSIS — G89.29 CHRONIC PAIN OF LEFT KNEE: Primary | ICD-10-CM

## 2022-10-11 PROCEDURE — 97530 THERAPEUTIC ACTIVITIES: CPT | Mod: GP | Performed by: PHYSICAL THERAPIST

## 2022-10-11 PROCEDURE — 97110 THERAPEUTIC EXERCISES: CPT | Mod: GP | Performed by: PHYSICAL THERAPIST

## 2022-10-12 DIAGNOSIS — N40.1 BENIGN NON-NODULAR PROSTATIC HYPERPLASIA WITH LOWER URINARY TRACT SYMPTOMS: ICD-10-CM

## 2022-10-12 DIAGNOSIS — I10 ESSENTIAL HYPERTENSION WITH GOAL BLOOD PRESSURE LESS THAN 140/90: ICD-10-CM

## 2022-10-12 DIAGNOSIS — E78.5 HYPERLIPIDEMIA LDL GOAL <130: ICD-10-CM

## 2022-10-12 DIAGNOSIS — N18.31 STAGE 3A CHRONIC KIDNEY DISEASE (H): ICD-10-CM

## 2022-10-12 DIAGNOSIS — K21.00 GASTROESOPHAGEAL REFLUX DISEASE WITH ESOPHAGITIS WITHOUT HEMORRHAGE: ICD-10-CM

## 2022-10-12 DIAGNOSIS — F33.8 SEASONAL DEPRESSION (H): ICD-10-CM

## 2022-10-13 RX ORDER — ESOMEPRAZOLE MAGNESIUM 40 MG/1
CAPSULE, DELAYED RELEASE ORAL
Qty: 90 CAPSULE | Refills: 3 | Status: SHIPPED | OUTPATIENT
Start: 2022-10-13 | End: 2023-02-02

## 2022-10-13 RX ORDER — VALSARTAN 80 MG/1
TABLET ORAL
Qty: 90 TABLET | Refills: 3 | OUTPATIENT
Start: 2022-10-13

## 2022-10-13 RX ORDER — DULOXETIN HYDROCHLORIDE 30 MG/1
CAPSULE, DELAYED RELEASE ORAL
Qty: 90 CAPSULE | Refills: 3 | Status: SHIPPED | OUTPATIENT
Start: 2022-10-13 | End: 2023-02-02 | Stop reason: DRUGHIGH

## 2022-10-13 RX ORDER — ROSUVASTATIN CALCIUM 10 MG/1
TABLET, COATED ORAL
Qty: 90 TABLET | Refills: 3 | OUTPATIENT
Start: 2022-10-13

## 2022-10-13 RX ORDER — FINASTERIDE 5 MG/1
TABLET, FILM COATED ORAL
Qty: 90 TABLET | Refills: 3 | OUTPATIENT
Start: 2022-10-13

## 2022-10-19 ENCOUNTER — MYC MEDICAL ADVICE (OUTPATIENT)
Dept: FAMILY MEDICINE | Facility: CLINIC | Age: 77
End: 2022-10-19

## 2022-10-19 ENCOUNTER — THERAPY VISIT (OUTPATIENT)
Dept: PHYSICAL THERAPY | Facility: CLINIC | Age: 77
End: 2022-10-19
Payer: COMMERCIAL

## 2022-10-19 DIAGNOSIS — M25.562 CHRONIC PAIN OF LEFT KNEE: Primary | ICD-10-CM

## 2022-10-19 DIAGNOSIS — G89.29 CHRONIC PAIN OF LEFT KNEE: Primary | ICD-10-CM

## 2022-10-19 PROCEDURE — 97110 THERAPEUTIC EXERCISES: CPT | Mod: GP | Performed by: PHYSICAL THERAPIST

## 2022-10-20 ENCOUNTER — OFFICE VISIT (OUTPATIENT)
Dept: FAMILY MEDICINE | Facility: CLINIC | Age: 77
End: 2022-10-20
Payer: COMMERCIAL

## 2022-10-20 VITALS
BODY MASS INDEX: 27.14 KG/M2 | RESPIRATION RATE: 24 BRPM | OXYGEN SATURATION: 98 % | SYSTOLIC BLOOD PRESSURE: 128 MMHG | DIASTOLIC BLOOD PRESSURE: 76 MMHG | HEIGHT: 70 IN | TEMPERATURE: 97.5 F | HEART RATE: 107 BPM | WEIGHT: 189.6 LBS

## 2022-10-20 DIAGNOSIS — G47.9 SLEEP DISTURBANCE: Primary | ICD-10-CM

## 2022-10-20 DIAGNOSIS — Z12.11 COLON CANCER SCREENING: ICD-10-CM

## 2022-10-20 PROCEDURE — 99213 OFFICE O/P EST LOW 20 MIN: CPT | Performed by: FAMILY MEDICINE

## 2022-10-20 RX ORDER — HYDROXYZINE PAMOATE 25 MG/1
25-50 CAPSULE ORAL
Qty: 60 CAPSULE | Refills: 1 | Status: SHIPPED | OUTPATIENT
Start: 2022-10-20 | End: 2022-11-14

## 2022-10-20 ASSESSMENT — ANXIETY QUESTIONNAIRES
4. TROUBLE RELAXING: SEVERAL DAYS
7. FEELING AFRAID AS IF SOMETHING AWFUL MIGHT HAPPEN: NOT AT ALL
GAD7 TOTAL SCORE: 4
3. WORRYING TOO MUCH ABOUT DIFFERENT THINGS: NOT AT ALL
6. BECOMING EASILY ANNOYED OR IRRITABLE: SEVERAL DAYS
8. IF YOU CHECKED OFF ANY PROBLEMS, HOW DIFFICULT HAVE THESE MADE IT FOR YOU TO DO YOUR WORK, TAKE CARE OF THINGS AT HOME, OR GET ALONG WITH OTHER PEOPLE?: NOT DIFFICULT AT ALL
7. FEELING AFRAID AS IF SOMETHING AWFUL MIGHT HAPPEN: NOT AT ALL
GAD7 TOTAL SCORE: 4
GAD7 TOTAL SCORE: 4
IF YOU CHECKED OFF ANY PROBLEMS ON THIS QUESTIONNAIRE, HOW DIFFICULT HAVE THESE PROBLEMS MADE IT FOR YOU TO DO YOUR WORK, TAKE CARE OF THINGS AT HOME, OR GET ALONG WITH OTHER PEOPLE: NOT DIFFICULT AT ALL
1. FEELING NERVOUS, ANXIOUS, OR ON EDGE: SEVERAL DAYS
5. BEING SO RESTLESS THAT IT IS HARD TO SIT STILL: SEVERAL DAYS
2. NOT BEING ABLE TO STOP OR CONTROL WORRYING: NOT AT ALL

## 2022-10-20 ASSESSMENT — PATIENT HEALTH QUESTIONNAIRE - PHQ9
SUM OF ALL RESPONSES TO PHQ QUESTIONS 1-9: 6
10. IF YOU CHECKED OFF ANY PROBLEMS, HOW DIFFICULT HAVE THESE PROBLEMS MADE IT FOR YOU TO DO YOUR WORK, TAKE CARE OF THINGS AT HOME, OR GET ALONG WITH OTHER PEOPLE: NOT DIFFICULT AT ALL
SUM OF ALL RESPONSES TO PHQ QUESTIONS 1-9: 6

## 2022-10-20 ASSESSMENT — PAIN SCALES - GENERAL: PAINLEVEL: MODERATE PAIN (4)

## 2022-10-21 ENCOUNTER — THERAPY VISIT (OUTPATIENT)
Dept: PHYSICAL THERAPY | Facility: CLINIC | Age: 77
End: 2022-10-21
Payer: COMMERCIAL

## 2022-10-21 DIAGNOSIS — M25.562 CHRONIC PAIN OF LEFT KNEE: Primary | ICD-10-CM

## 2022-10-21 DIAGNOSIS — G89.29 CHRONIC PAIN OF LEFT KNEE: Primary | ICD-10-CM

## 2022-10-21 PROCEDURE — 97110 THERAPEUTIC EXERCISES: CPT | Mod: GP | Performed by: PHYSICAL THERAPIST

## 2022-10-24 DIAGNOSIS — Z96.652 STATUS POST TOTAL LEFT KNEE REPLACEMENT: Primary | ICD-10-CM

## 2022-10-25 ENCOUNTER — THERAPY VISIT (OUTPATIENT)
Dept: PHYSICAL THERAPY | Facility: CLINIC | Age: 77
End: 2022-10-25
Payer: COMMERCIAL

## 2022-10-25 DIAGNOSIS — G89.29 CHRONIC PAIN OF LEFT KNEE: Primary | ICD-10-CM

## 2022-10-25 DIAGNOSIS — M25.562 CHRONIC PAIN OF LEFT KNEE: Primary | ICD-10-CM

## 2022-10-25 PROCEDURE — 97140 MANUAL THERAPY 1/> REGIONS: CPT | Mod: GP | Performed by: PHYSICAL THERAPIST

## 2022-10-25 PROCEDURE — 97110 THERAPEUTIC EXERCISES: CPT | Mod: GP | Performed by: PHYSICAL THERAPIST

## 2022-10-28 ENCOUNTER — ANCILLARY PROCEDURE (OUTPATIENT)
Dept: GENERAL RADIOLOGY | Facility: CLINIC | Age: 77
End: 2022-10-28
Attending: ORTHOPAEDIC SURGERY
Payer: COMMERCIAL

## 2022-10-28 ENCOUNTER — OFFICE VISIT (OUTPATIENT)
Dept: ORTHOPEDICS | Facility: CLINIC | Age: 77
End: 2022-10-28
Payer: COMMERCIAL

## 2022-10-28 ENCOUNTER — THERAPY VISIT (OUTPATIENT)
Dept: PHYSICAL THERAPY | Facility: CLINIC | Age: 77
End: 2022-10-28
Payer: COMMERCIAL

## 2022-10-28 DIAGNOSIS — G89.29 CHRONIC PAIN OF LEFT KNEE: Primary | ICD-10-CM

## 2022-10-28 DIAGNOSIS — Z96.652 STATUS POST TOTAL LEFT KNEE REPLACEMENT: ICD-10-CM

## 2022-10-28 DIAGNOSIS — Z96.652 STATUS POST TOTAL LEFT KNEE REPLACEMENT: Primary | ICD-10-CM

## 2022-10-28 DIAGNOSIS — M25.562 CHRONIC PAIN OF LEFT KNEE: Primary | ICD-10-CM

## 2022-10-28 PROCEDURE — 99024 POSTOP FOLLOW-UP VISIT: CPT | Performed by: ORTHOPAEDIC SURGERY

## 2022-10-28 PROCEDURE — 97110 THERAPEUTIC EXERCISES: CPT | Mod: GP | Performed by: PHYSICAL THERAPIST

## 2022-10-28 PROCEDURE — 73562 X-RAY EXAM OF KNEE 3: CPT | Mod: LT | Performed by: RADIOLOGY

## 2022-10-28 PROCEDURE — 97140 MANUAL THERAPY 1/> REGIONS: CPT | Mod: GP | Performed by: PHYSICAL THERAPIST

## 2022-10-28 NOTE — LETTER
10/28/2022         RE: Jerry Miguel  [address] [place] [address]        Dear Colleague,    Thank you for referring your patient, Jerry Miguel, to the [facility] [place]. Please see a copy of my visit note below.    S:  Doing well after TKA.  Urinary issues have resolved.  Continues PT.         Patient Active Problem List   Diagnosis     Osteoarthritis of knee     CARDIOVASCULAR SCREENING; LDL GOAL LESS THAN 130     Seasonal allergic rhinitis     Hyperlipidemia LDL goal <130     Benign non-nodular prostatic hyperplasia with lower urinary tract symptoms     GERD (gastroesophageal reflux disease)     Hyperthyroidism     Essential hypertension with goal blood pressure less than 140/90     Right knee DJD     CKD (chronic kidney disease) stage 3, GFR 30-59 ml/min (H)     Seasonal depression (H)     Nuclear senile cataract of both eyes     Arthritis of knee     Pre-op evaluation     Age-related osteoporosis without current pathological fracture     Chronic pain of left knee     Personal history of other endocrine, nutritional and metabolic disease     Left knee pain            Past Medical History:   Diagnosis Date     Arthritis     knee     BPH (benign prostatic hyperplasia)      Chronic prostatitis      Gastro-oesophageal reflux disease      Hypertension      Kidney stone      Shingles      Thyroid disease 2001-about    h/o hyperthyroid,treated, now normal            Past Surgical History:   Procedure Laterality Date     APPENDECTOMY       ARTHROPLASTY KNEE Right 1/4/2016    Procedure: ARTHROPLASTY KNEE;  Surgeon: Manuel Ponce MD;  [facility]     ARTHROPLASTY KNEE Left 9/15/2022    Procedure: ARTHROPLASTY, KNEE, TOTAL LEFT;  Surgeon: Jesus Rodriguez MD;  [facility]     COLONOSCOPY       COLONOSCOPY WITH CO2 INSUFFLATION N/A 3/2/2018    Procedure: COLONOSCOPY WITH CO2 INSUFFLATION;  Colonoscopy, Constipation, unspecified constipation type Recent change  in frequency of bowel movements, [place], BMI 31.52, [facility];  Surgeon: Edith Craig MD;  Location: [facility]     ENT SURGERY      tonsillectomy     LASER HOLMIUM LITHOTRIPSY URETER(S), INSERT STENT, COMBINED  10/11/2012    Procedure: COMBINED CYSTOSCOPY, URETEROSCOPY, LASER HOLMIUM LITHOTRIPSY URETER(S), INSERT STENT;  CYSTOSCOPY, ATTEMPTED RIGHT URETEROSCOPY, INSERT RIGHT URETERAL STENT, Laser [place];  Surgeon: Petar Ulrich MD;  [facility]            Social History     Tobacco Use     Smoking status: Former     Packs/day: 1.00     Years: 10.00     Pack years: 10.00     Types: Cigarettes     Quit date: 1973     Years since quittin.8     Smokeless tobacco: Never   Substance Use Topics     Alcohol use: Not Currently            Family History   Problem Relation Age of Onset     Cancer Mother         ovarian     Cancer Father         lung     Heart Disease Father         possibly     Cancer Paternal Grandfather         esophageal cancer     Cancer Brother 68        prostate cancer     Diabetes Maternal Grandmother      Breast Cancer No family hx of      Cancer - colorectal No family hx of      Coronary Artery Disease No family hx of      Hypertension No family hx of      Hyperlipidemia No family hx of      Cerebrovascular Disease No family hx of      Colon Cancer No family hx of      Prostate Cancer No family hx of      Other Cancer No family hx of      Depression No family hx of      Anxiety Disorder No family hx of      Mental Illness No family hx of      Substance Abuse No family hx of      Anesthesia Reaction No family hx of      Asthma No family hx of      Osteoporosis No family hx of      Genetic Disorder No family hx of      Thyroid Disease No family hx of      Obesity No family hx of      Unknown/Adopted No family hx of      Macular Degeneration No family hx of      Glaucoma No family hx of                Allergies   Allergen Reactions     Codeine Sulfate Nausea and Vomiting             Current Outpatient Medications   Medication Sig Dispense Refill     acetaminophen (TYLENOL) 325 MG tablet Take 2 tablets (650 mg) by mouth every 4 hours as needed for other (mild pain) 100 tablet 0     acetaminophen (TYLENOL) 650 MG CR tablet Take 1,300 mg by mouth nightly as needed for mild pain       aspirin 81 MG EC tablet Take 1 tablet (81 mg) by mouth 2 times daily 60 tablet 0     DULoxetine (CYMBALTA) 30 MG capsule TAKE 1 CAPSULE BY MOUTH  DAILY 90 capsule 3     esomeprazole (NEXIUM) 40 MG DR capsule TAKE 1 CAPSULE BY MOUTH 30  TO 60 MINUTES BEFORE EATING 90 capsule 3     finasteride (PROSCAR) 5 MG tablet Take 1 tablet (5 mg) by mouth daily 90 tablet 3     fluticasone (VERAMYST) 27.5 MCG/SPRAY spray Spray 1 spray into both nostrils daily as needed       hydrOXYzine (VISTARIL) 25 MG capsule Take 1-2 capsules (25-50 mg) by mouth nightly as needed for other (sleep) 60 capsule 1     rosuvastatin (CRESTOR) 10 MG tablet Take 1 tablet (10 mg) by mouth daily 90 tablet 3     valsartan (DIOVAN) 80 MG tablet Take 1 tablet (80 mg) by mouth daily 90 tablet 3     VITAMIN D, CHOLECALCIFEROL, PO Take 400 Units by mouth daily       vitamin D3 (CHOLECALCIFEROL) 1.25 MG (61151 UT) capsule Take 1 capsule (50,000 Units) by mouth every 7 days 12 capsule 0          Review Of Systems  Skin: negative  Eyes: negative  Ears/Nose/Throat: negative  Respiratory: No shortness of breath, dyspnea on exertion, cough, or hemoptysis    O: Physical exam:  Wound clean and dry, no erythema, no drainage.  Few areas of eschar remain.  Supple.  Nearly full extension and greater than 90 degrees flexion.  Bimalleolar axis comparable to contralateral extremity.  CMS intact.    Images:  Findings:     AP, lateral and patellofemoral views of the left knee were obtained.      No acute osseous abnormality. Moderate joint effusion.     Stable postsurgical changes of total knee arthroplasty with interval  resolution of soft tissue gas and removal of  surgical drain.     Mild mineralization around the knee.     Patellar enthesopathy. Increased soft tissue swelling particularly  anteriorly. Vascular calcifications.     No patellar tilt or lateral subluxation.                                                                      IMPRESSION: Stable total knee arthroplasty without evidence of  hardware complication.         A: Stable following L TKA, resolution postop urinary retention    P:  Continue wound care.  Notify if exacerbation symptoms.  Continue PT as seems to be helping patient.  See back 6 weeks if issues, if not will see back in one year with follow up images.           In addition to the above assessment and plan each active problem on Jerry's problem list was evaluated today. This included the questioning of Jerry for any medication problems. We will continue the current treatment plan for these active problems except as noted.        LILLY BOWMAN MD

## 2022-10-28 NOTE — PROGRESS NOTES
PROGRESS  REPORT    Progress reporting period is from 9/23/2022 to 10/28/2022.       SUBJECTIVE  Subjective changes noted by patient:  Doing HEP consistently and riding bike at home. Knee still feels stiff throughout the day.       Initial Pain level: 5/10.   Changes in function:  Yes (See Goal flowsheet attached for changes in current functional level)  Adverse reaction to treatment or activity: None    OBJECTIVE  Changes noted in objective findings:  The objective findings below are from DOS 10/28/2022.    AAROM - 0-5-119  Gait - Ambulates without AD and mild limp  Incision - Healing well with no signs of infection noted  Step ups performed on 6 inch step with handrail assist    ASSESSMENT/PLAN  Updated problem list and treatment plan: Diagnosis 1:  Left knee pain  Pain -  manual therapy, self management, education and home program  Decreased ROM/flexibility - manual therapy, therapeutic exercise, therapeutic activity and home program  Decreased joint mobility - manual therapy, therapeutic exercise, therapeutic activity and home program  Decreased strength - therapeutic exercise, therapeutic activities and home program  Decreased function - therapeutic activities and home program  STG/LTGs have been met or progress has been made towards goals:  Yes (See Goal flow sheet completed today.)  Assessment of Progress: The patient's condition is improving.  Self Management Plans:  Patient has been instructed in a home treatment program.  Patient is independent in a home treatment program.  I have re-evaluated this patient and find that the nature, scope, duration and intensity of the therapy is appropriate for the medical condition of the patient.  Jerry continues to require the following intervention to meet STG and LTG's:  PT    Recommendations:  This patient would benefit from continued therapy.     Frequency:  1 X week, once daily  Duration:  for 4 weeks        Please refer to the daily flowsheet for treatment  today, total treatment time and time spent performing 1:1 timed codes.

## 2022-10-29 NOTE — PROGRESS NOTES
S:  Doing well after TKA.  Urinary issues have resolved.  Continues PT.         Patient Active Problem List   Diagnosis     Osteoarthritis of knee     CARDIOVASCULAR SCREENING; LDL GOAL LESS THAN 130     Seasonal allergic rhinitis     Hyperlipidemia LDL goal <130     Benign non-nodular prostatic hyperplasia with lower urinary tract symptoms     GERD (gastroesophageal reflux disease)     Hyperthyroidism     Essential hypertension with goal blood pressure less than 140/90     Right knee DJD     CKD (chronic kidney disease) stage 3, GFR 30-59 ml/min (H)     Seasonal depression (H)     Nuclear senile cataract of both eyes     Arthritis of knee     Pre-op evaluation     Age-related osteoporosis without current pathological fracture     Chronic pain of left knee     Personal history of other endocrine, nutritional and metabolic disease     Left knee pain            Past Medical History:   Diagnosis Date     Arthritis     knee     BPH (benign prostatic hyperplasia)      Chronic prostatitis      Gastro-oesophageal reflux disease      Hypertension      Kidney stone      Shingles      Thyroid disease 2001-about    h/o hyperthyroid,treated, now normal            Past Surgical History:   Procedure Laterality Date     APPENDECTOMY       ARTHROPLASTY KNEE Right 1/4/2016    Procedure: ARTHROPLASTY KNEE;  Surgeon: Manuel Ponce MD;  [facility]     ARTHROPLASTY KNEE Left 9/15/2022    Procedure: ARTHROPLASTY, KNEE, TOTAL LEFT;  Surgeon: Jesus Rodriguez MD;  [facility]     COLONOSCOPY       COLONOSCOPY WITH CO2 INSUFFLATION N/A 3/2/2018    Procedure: COLONOSCOPY WITH CO2 INSUFFLATION;  Colonoscopy, Constipation, unspecified constipation type Recent change in frequency of bowel movements, [place], BMI 31.52, [facility];  Surgeon: Edith Craig MD;  [facility]     ENT SURGERY      tonsillectomy     LASER HOLMIUM LITHOTRIPSY URETER(S), INSERT STENT, COMBINED  10/11/2012    Procedure: COMBINED  CYSTOSCOPY, URETEROSCOPY, LASER HOLMIUM LITHOTRIPSY URETER(S), INSERT STENT;  CYSTOSCOPY, ATTEMPTED RIGHT URETEROSCOPY, INSERT RIGHT URETERAL STENT, Laser [facility];  Surgeon: Petar Ulrich MD;  Location:  OR            Social History     Tobacco Use     Smoking status: Former     Packs/day: 1.00     Years: 10.00     Pack years: 10.00     Types: Cigarettes     Quit date: 1973     Years since quittin.8     Smokeless tobacco: Never   Substance Use Topics     Alcohol use: Not Currently            Family History   Problem Relation Age of Onset     Cancer Mother         ovarian     Cancer Father         lung     Heart Disease Father         possibly     Cancer Paternal Grandfather         esophageal cancer     Cancer Brother 68        prostate cancer     Diabetes Maternal Grandmother      Breast Cancer No family hx of      Cancer - colorectal No family hx of      Coronary Artery Disease No family hx of      Hypertension No family hx of      Hyperlipidemia No family hx of      Cerebrovascular Disease No family hx of      Colon Cancer No family hx of      Prostate Cancer No family hx of      Other Cancer No family hx of      Depression No family hx of      Anxiety Disorder No family hx of      Mental Illness No family hx of      Substance Abuse No family hx of      Anesthesia Reaction No family hx of      Asthma No family hx of      Osteoporosis No family hx of      Genetic Disorder No family hx of      Thyroid Disease No family hx of      Obesity No family hx of      Unknown/Adopted No family hx of      Macular Degeneration No family hx of      Glaucoma No family hx of                Allergies   Allergen Reactions     Codeine Sulfate Nausea and Vomiting            Current Outpatient Medications   Medication Sig Dispense Refill     acetaminophen (TYLENOL) 325 MG tablet Take 2 tablets (650 mg) by mouth every 4 hours as needed for other (mild pain) 100 tablet 0     acetaminophen (TYLENOL) 650 MG CR tablet  Take 1,300 mg by mouth nightly as needed for mild pain       aspirin 81 MG EC tablet Take 1 tablet (81 mg) by mouth 2 times daily 60 tablet 0     DULoxetine (CYMBALTA) 30 MG capsule TAKE 1 CAPSULE BY MOUTH  DAILY 90 capsule 3     esomeprazole (NEXIUM) 40 MG DR capsule TAKE 1 CAPSULE BY MOUTH 30  TO 60 MINUTES BEFORE EATING 90 capsule 3     finasteride (PROSCAR) 5 MG tablet Take 1 tablet (5 mg) by mouth daily 90 tablet 3     fluticasone (VERAMYST) 27.5 MCG/SPRAY spray Spray 1 spray into both nostrils daily as needed       hydrOXYzine (VISTARIL) 25 MG capsule Take 1-2 capsules (25-50 mg) by mouth nightly as needed for other (sleep) 60 capsule 1     rosuvastatin (CRESTOR) 10 MG tablet Take 1 tablet (10 mg) by mouth daily 90 tablet 3     valsartan (DIOVAN) 80 MG tablet Take 1 tablet (80 mg) by mouth daily 90 tablet 3     VITAMIN D, CHOLECALCIFEROL, PO Take 400 Units by mouth daily       vitamin D3 (CHOLECALCIFEROL) 1.25 MG (51859 UT) capsule Take 1 capsule (50,000 Units) by mouth every 7 days 12 capsule 0          Review Of Systems  Skin: negative  Eyes: negative  Ears/Nose/Throat: negative  Respiratory: No shortness of breath, dyspnea on exertion, cough, or hemoptysis    O: Physical exam:  Wound clean and dry, no erythema, no drainage.  Few areas of eschar remain.  Supple.  Nearly full extension and greater than 90 degrees flexion.  Bimalleolar axis comparable to contralateral extremity.  CMS intact.    Images:  Findings:     AP, lateral and patellofemoral views of the left knee were obtained.      No acute osseous abnormality. Moderate joint effusion.     Stable postsurgical changes of total knee arthroplasty with interval  resolution of soft tissue gas and removal of surgical drain.     Mild mineralization around the knee.     Patellar enthesopathy. Increased soft tissue swelling particularly  anteriorly. Vascular calcifications.     No patellar tilt or lateral subluxation.                                                                       IMPRESSION: Stable total knee arthroplasty without evidence of  hardware complication.         A: Stable following L TKA, resolution postop urinary retention    P:  Continue wound care.  Notify if exacerbation symptoms.  Continue PT as seems to be helping patient.  See back 6 weeks if issues, if not will see back in one year with follow up images.           In addition to the above assessment and plan each active problem on Jerry's problem list was evaluated today. This included the questioning of Jerry for any medication problems. We will continue the current treatment plan for these active problems except as noted.

## 2022-11-04 ENCOUNTER — THERAPY VISIT (OUTPATIENT)
Dept: PHYSICAL THERAPY | Facility: CLINIC | Age: 77
End: 2022-11-04
Payer: COMMERCIAL

## 2022-11-04 DIAGNOSIS — G89.29 CHRONIC PAIN OF LEFT KNEE: Primary | ICD-10-CM

## 2022-11-04 DIAGNOSIS — M25.562 CHRONIC PAIN OF LEFT KNEE: Primary | ICD-10-CM

## 2022-11-04 PROCEDURE — 97140 MANUAL THERAPY 1/> REGIONS: CPT | Mod: GP | Performed by: PHYSICAL THERAPIST

## 2022-11-04 PROCEDURE — 97110 THERAPEUTIC EXERCISES: CPT | Mod: GP | Performed by: PHYSICAL THERAPIST

## 2022-11-11 ENCOUNTER — THERAPY VISIT (OUTPATIENT)
Dept: PHYSICAL THERAPY | Facility: CLINIC | Age: 77
End: 2022-11-11
Payer: COMMERCIAL

## 2022-11-11 DIAGNOSIS — M25.562 CHRONIC PAIN OF LEFT KNEE: Primary | ICD-10-CM

## 2022-11-11 DIAGNOSIS — G89.29 CHRONIC PAIN OF LEFT KNEE: Primary | ICD-10-CM

## 2022-11-11 PROCEDURE — 97140 MANUAL THERAPY 1/> REGIONS: CPT | Mod: GP | Performed by: PHYSICAL THERAPIST

## 2022-11-11 PROCEDURE — 97110 THERAPEUTIC EXERCISES: CPT | Mod: GP | Performed by: PHYSICAL THERAPIST

## 2022-11-11 NOTE — PROGRESS NOTES
[facility]    OUTPATIENT Physical Therapy ORTHOPEDIC EVALUATION  PLAN OF TREATMENT FOR OUTPATIENT REHABILITATION  (COMPLETE FOR INITIAL CLAIMS ONLY)  Patient's Last Name, First Name, M.I.  YOB: 1945  Jerry Miguel    Provider s Name:  [facility]   Medical Record No.  [id number]   Start of Care Date:  09/23/22   Onset Date:  09/15/22    Treatment Diagnosis:  Left knee pain Medical Diagnosis:  Chronic pain of left knee       Goals:     11/11/22 0500   Body Part   Goals listed below are for Knee   Goal #1   Goal #1 ambulation   Previous Functional Level No restrictions   Current Functional Level Minutes patient can walk   Performance Level 5 with FWW   STG Target Performance Minutes patient will be able to walk;with cane   Performance Level 15   Rationale for safe household ambulation;for safe outdoor household ambulation;for safe community ambulation   Due Date 10/14/22   Date Goal Met 10/04/22    LTG Target Performance Minutes patient will be able to  walk;without assistive device   Performance Level 20 with 1-2/10 pain   Rationale for safe household ambulation;for safe outdoor household ambulation;for safe community ambulation   Due Date 10/28/22   Date Goal Met 11/11/22   Goal #2   Goal #2 stairs   Previous Functional Level No restrictions   Current Functional Level Ascend/descend stairs;one step at a time;with a railing   Performance Level 5/10 pain   STG Target Performance Ascend/descend stairs;one step at a time;with a railing   Performance Level 3/10 pain   Rationale to enter/leave the house safely   Due Date 10/14/22   Date Goal Met 10/19/22   LTG Target Performance Ascend/descend stairs;in a normal reciprocal pattern;with railing   Performance Level 1-2/10 pain   Rationale to enter/leave the house safely   Due Date 12/09/22   If goal not met, Why? Unable  to perform on full size step       Therapy Frequency:  1 time per week  Predicted Duration of Therapy Intervention:  4 weeks    Geraldo Appiah, PT                 I CERTIFY THE NEED FOR THESE SERVICES FURNISHED UNDER        THIS PLAN OF TREATMENT AND WHILE UNDER MY CARE     (Physician attestation of this document indicates review and certification of the therapy plan).                     Certification Date From:  11/19/22   Certification Date To:  12/09/22    Referring Provider:  Jesus Rodriguez    Initial Assessment        See Epic Evaluation SOC Date: 09/23/22

## 2022-11-11 NOTE — PROGRESS NOTES
PROGRESS  REPORT    Progress reporting period is from 9/23/2022 to 11/11/2022.       SUBJECTIVE  Subjective: Still having some trouble sleeping. Takes awhile for pain to dissipate before can fall asleep. Ices and elevates before sleeping.    Initial Pain level: 5/10.   Changes in function:  Yes (See Goal flowsheet attached for changes in current functional level)  Adverse reaction to treatment or activity: None    OBJECTIVE  Changes noted in objective findings:  The objective findings below are from DOS 11/11/2022.    ROM - 0-7-120  Gait - Ambulates with a minimal limp and without right ear  Good LE strength for step up, difficulty with full step down     ASSESSMENT/PLAN  Updated problem list and treatment plan: Diagnosis 1:  Left knee pain  Pain -  manual therapy, self management, education and home program  Decreased ROM/flexibility - manual therapy, therapeutic exercise, therapeutic activity and home program  Decreased joint mobility - manual therapy, therapeutic exercise, therapeutic activity and home program  Decreased strength - therapeutic exercise, therapeutic activities and home program  Decreased function - therapeutic activities and home program  STG/LTGs have been met or progress has been made towards goals:  Yes (See Goal flow sheet completed today.)  Assessment of Progress: The patient's condition is improving.  Self Management Plans:  Patient has been instructed in a home treatment program.  Patient is independent in a home treatment program.  I have re-evaluated this patient and find that the nature, scope, duration and intensity of the therapy is appropriate for the medical condition of the patient.  Jerry continues to require the following intervention to meet STG and LTG's:  PT    Recommendations:  This patient would benefit from continued therapy.     Frequency:  1 X week, once daily  Duration:  for 4 weeks        Please refer to the daily flowsheet for treatment today, total treatment time and  time spent performing 1:1 timed codes.

## 2022-11-12 DIAGNOSIS — G47.9 SLEEP DISTURBANCE: ICD-10-CM

## 2022-11-14 RX ORDER — HYDROXYZINE PAMOATE 25 MG/1
25-50 CAPSULE ORAL
Qty: 60 CAPSULE | Refills: 1 | Status: SHIPPED | OUTPATIENT
Start: 2022-11-14 | End: 2022-11-29

## 2022-11-25 ENCOUNTER — THERAPY VISIT (OUTPATIENT)
Dept: PHYSICAL THERAPY | Facility: CLINIC | Age: 77
End: 2022-11-25
Payer: COMMERCIAL

## 2022-11-25 DIAGNOSIS — M25.562 CHRONIC PAIN OF LEFT KNEE: Primary | ICD-10-CM

## 2022-11-25 DIAGNOSIS — G89.29 CHRONIC PAIN OF LEFT KNEE: Primary | ICD-10-CM

## 2022-11-25 PROCEDURE — 97110 THERAPEUTIC EXERCISES: CPT | Mod: GP | Performed by: PHYSICAL THERAPIST

## 2022-11-25 PROCEDURE — 97140 MANUAL THERAPY 1/> REGIONS: CPT | Mod: GP | Performed by: PHYSICAL THERAPIST

## 2022-11-26 DIAGNOSIS — E55.9 HYPOVITAMINOSIS D: ICD-10-CM

## 2022-11-28 DIAGNOSIS — G47.9 SLEEP DISTURBANCE: ICD-10-CM

## 2022-11-29 RX ORDER — HYDROXYZINE PAMOATE 25 MG/1
25-50 CAPSULE ORAL
Qty: 180 CAPSULE | Refills: 1 | Status: SHIPPED | OUTPATIENT
Start: 2022-11-29 | End: 2023-02-02

## 2022-12-02 ENCOUNTER — THERAPY VISIT (OUTPATIENT)
Dept: PHYSICAL THERAPY | Facility: CLINIC | Age: 77
End: 2022-12-02
Payer: COMMERCIAL

## 2022-12-02 DIAGNOSIS — M25.562 CHRONIC PAIN OF LEFT KNEE: Primary | ICD-10-CM

## 2022-12-02 DIAGNOSIS — G89.29 CHRONIC PAIN OF LEFT KNEE: Primary | ICD-10-CM

## 2022-12-02 PROCEDURE — 97110 THERAPEUTIC EXERCISES: CPT | Mod: GP | Performed by: PHYSICAL THERAPIST

## 2022-12-02 ASSESSMENT — ACTIVITIES OF DAILY LIVING (ADL)
RAW_SCORE: 52
GO DOWN STAIRS: ACTIVITY IS SOMEWHAT DIFFICULT
GIVING WAY, BUCKLING OR SHIFTING OF KNEE: I HAVE THE SYMPTOM BUT IT DOES NOT AFFECT MY ACTIVITY
AS_A_RESULT_OF_YOUR_KNEE_INJURY,_HOW_WOULD_YOU_RATE_YOUR_CURRENT_LEVEL_OF_DAILY_ACTIVITY?: NEARLY NORMAL
STAND: ACTIVITY IS MINIMALLY DIFFICULT
PAIN: THE SYMPTOM AFFECTS MY ACTIVITY SLIGHTLY
WALK: ACTIVITY IS MINIMALLY DIFFICULT
GO UP STAIRS: ACTIVITY IS SOMEWHAT DIFFICULT
HOW_WOULD_YOU_RATE_THE_CURRENT_FUNCTION_OF_YOUR_KNEE_DURING_YOUR_USUAL_DAILY_ACTIVITIES_ON_A_SCALE_FROM_0_TO_100_WITH_100_BEING_YOUR_LEVEL_OF_KNEE_FUNCTION_PRIOR_TO_YOUR_INJURY_AND_0_BEING_THE_INABILITY_TO_PERFORM_ANY_OF_YOUR_USUAL_DAILY_ACTIVITIES?: 70
WEAKNESS: THE SYMPTOM AFFECTS MY ACTIVITY SLIGHTLY
LIMPING: I HAVE THE SYMPTOM BUT IT DOES NOT AFFECT MY ACTIVITY
SIT WITH YOUR KNEE BENT: ACTIVITY IS NOT DIFFICULT
KNEEL ON THE FRONT OF YOUR KNEE: ACTIVITY IS MINIMALLY DIFFICULT
KNEE_ACTIVITY_OF_DAILY_LIVING_SUM: 52
SWELLING: THE SYMPTOM AFFECTS MY ACTIVITY SLIGHTLY
RISE FROM A CHAIR: ACTIVITY IS NOT DIFFICULT
KNEE_ACTIVITY_OF_DAILY_LIVING_SCORE: 74.29
SQUAT: ACTIVITY IS MINIMALLY DIFFICULT
STIFFNESS: THE SYMPTOM AFFECTS MY ACTIVITY SLIGHTLY
HOW_WOULD_YOU_RATE_THE_OVERALL_FUNCTION_OF_YOUR_KNEE_DURING_YOUR_USUAL_DAILY_ACTIVITIES?: NEARLY NORMAL

## 2022-12-02 NOTE — PROGRESS NOTES
DISCHARGE REPORT    Progress reporting period is from 9/23/2022 to 12/2/2022.       SUBJECTIVE  Subjective: Things are a little swollen today. Has been up on the knee quite a bit. Sleeping better. Going up and down the stairs better.    Current Pain level: 2/10.     Initial Pain level: 5/10.   Changes in function:  Yes (See Goal flowsheet attached for changes in current functional level)  Adverse reaction to treatment or activity: None    OBJECTIVE  Changes noted in objective findings:  The objective findings below are from DOS 12/2/2022.    Knee Outrome Survey - 74.29 (32.86 initially)  ROM - 0-5-120  Gait - Non antalgic, no AD needed  Stairs - Reciprical gait pattern with handrail assist performed safely     ASSESSMENT/PLAN  Updated problem list and treatment plan: Diagnosis 1:  Left knee pain  STG/LTGs have been met or progress has been made towards goals:  Yes (See Goal flow sheet completed today.)  Assessment of Progress: The patient has met all of their long term goals.  Self Management Plans:  Patient has been instructed in a home treatment program.  Patient is independent in a home treatment program.  I have re-evaluated this patient and find that the nature, scope, duration and intensity of the therapy is appropriate for the medical condition of the patient.    Recommendations:  This patient is ready to be discharged from therapy and continue their home treatment program.    Please refer to the daily flowsheet for treatment today, total treatment time and time spent performing 1:1 timed codes.

## 2023-02-02 ENCOUNTER — OFFICE VISIT (OUTPATIENT)
Dept: FAMILY MEDICINE | Facility: CLINIC | Age: 78
End: 2023-02-02
Payer: COMMERCIAL

## 2023-02-02 VITALS
HEART RATE: 65 BPM | OXYGEN SATURATION: 99 % | WEIGHT: 199 LBS | TEMPERATURE: 97.8 F | HEIGHT: 70 IN | BODY MASS INDEX: 28.49 KG/M2 | SYSTOLIC BLOOD PRESSURE: 132 MMHG | RESPIRATION RATE: 26 BRPM | DIASTOLIC BLOOD PRESSURE: 76 MMHG

## 2023-02-02 DIAGNOSIS — I10 ESSENTIAL HYPERTENSION WITH GOAL BLOOD PRESSURE LESS THAN 140/90: ICD-10-CM

## 2023-02-02 DIAGNOSIS — Z00.00 ENCOUNTER FOR MEDICARE ANNUAL WELLNESS EXAM: Primary | ICD-10-CM

## 2023-02-02 DIAGNOSIS — N18.2 STAGE 2 CHRONIC KIDNEY DISEASE: ICD-10-CM

## 2023-02-02 DIAGNOSIS — N40.1 BENIGN NON-NODULAR PROSTATIC HYPERPLASIA WITH LOWER URINARY TRACT SYMPTOMS: ICD-10-CM

## 2023-02-02 DIAGNOSIS — Z11.1 SCREENING EXAMINATION FOR PULMONARY TUBERCULOSIS: ICD-10-CM

## 2023-02-02 DIAGNOSIS — Z12.5 SCREENING FOR PROSTATE CANCER: ICD-10-CM

## 2023-02-02 DIAGNOSIS — K21.00 GASTROESOPHAGEAL REFLUX DISEASE WITH ESOPHAGITIS WITHOUT HEMORRHAGE: ICD-10-CM

## 2023-02-02 DIAGNOSIS — F33.8 SEASONAL DEPRESSION (H): ICD-10-CM

## 2023-02-02 DIAGNOSIS — E78.5 HYPERLIPIDEMIA LDL GOAL <130: ICD-10-CM

## 2023-02-02 PROBLEM — N18.30 CKD (CHRONIC KIDNEY DISEASE) STAGE 3, GFR 30-59 ML/MIN (H): Status: RESOLVED | Noted: 2019-09-19 | Resolved: 2023-02-02

## 2023-02-02 PROCEDURE — 80061 LIPID PANEL: CPT | Performed by: FAMILY MEDICINE

## 2023-02-02 PROCEDURE — 86481 TB AG RESPONSE T-CELL SUSP: CPT | Performed by: FAMILY MEDICINE

## 2023-02-02 PROCEDURE — G0103 PSA SCREENING: HCPCS | Performed by: FAMILY MEDICINE

## 2023-02-02 PROCEDURE — 99213 OFFICE O/P EST LOW 20 MIN: CPT | Mod: 25 | Performed by: FAMILY MEDICINE

## 2023-02-02 PROCEDURE — 82570 ASSAY OF URINE CREATININE: CPT | Performed by: FAMILY MEDICINE

## 2023-02-02 PROCEDURE — G0439 PPPS, SUBSEQ VISIT: HCPCS | Performed by: FAMILY MEDICINE

## 2023-02-02 PROCEDURE — 36415 COLL VENOUS BLD VENIPUNCTURE: CPT | Performed by: FAMILY MEDICINE

## 2023-02-02 PROCEDURE — 82043 UR ALBUMIN QUANTITATIVE: CPT | Performed by: FAMILY MEDICINE

## 2023-02-02 PROCEDURE — 80053 COMPREHEN METABOLIC PANEL: CPT | Performed by: FAMILY MEDICINE

## 2023-02-02 RX ORDER — VALSARTAN 80 MG/1
80 TABLET ORAL DAILY
Qty: 90 TABLET | Refills: 3 | Status: SHIPPED | OUTPATIENT
Start: 2023-02-02 | End: 2023-02-06

## 2023-02-02 RX ORDER — FINASTERIDE 5 MG/1
1 TABLET, FILM COATED ORAL DAILY
Qty: 90 TABLET | Refills: 3 | Status: SHIPPED | OUTPATIENT
Start: 2023-02-02 | End: 2023-02-06

## 2023-02-02 RX ORDER — ESOMEPRAZOLE MAGNESIUM 40 MG/1
CAPSULE, DELAYED RELEASE ORAL
Qty: 90 CAPSULE | Refills: 3 | Status: SHIPPED | OUTPATIENT
Start: 2023-02-02 | End: 2024-05-06

## 2023-02-02 RX ORDER — HYDROXYZINE PAMOATE 25 MG/1
25-50 CAPSULE ORAL
Qty: 180 CAPSULE | Refills: 1 | Status: SHIPPED | OUTPATIENT
Start: 2023-02-02 | End: 2023-11-09

## 2023-02-02 RX ORDER — ROSUVASTATIN CALCIUM 10 MG/1
10 TABLET, COATED ORAL DAILY
Qty: 90 TABLET | Refills: 3 | Status: SHIPPED | OUTPATIENT
Start: 2023-02-02 | End: 2023-11-21

## 2023-02-02 RX ORDER — DULOXETIN HYDROCHLORIDE 20 MG/1
CAPSULE, DELAYED RELEASE ORAL
Qty: 30 CAPSULE | Refills: 0 | Status: SHIPPED | OUTPATIENT
Start: 2023-02-02 | End: 2023-02-20

## 2023-02-02 ASSESSMENT — ENCOUNTER SYMPTOMS
MYALGIAS: 1
SHORTNESS OF BREATH: 0
DYSURIA: 0
ARTHRALGIAS: 1
HEMATOLOGIC/LYMPHATIC NEGATIVE: 1
FREQUENCY: 0
ABDOMINAL PAIN: 0
CHILLS: 0
HEARTBURN: 1
SORE THROAT: 0
DIARRHEA: 0
ALLERGIC/IMMUNOLOGIC NEGATIVE: 1
NERVOUS/ANXIOUS: 1
ENDOCRINE NEGATIVE: 1
HEMATOCHEZIA: 0
DIZZINESS: 0
NAUSEA: 0
WEAKNESS: 0
CONSTIPATION: 0
EYE PAIN: 0
JOINT SWELLING: 1
PALPITATIONS: 0
HEADACHES: 0
FEVER: 0
PARESTHESIAS: 0
COUGH: 0
HEMATURIA: 0

## 2023-02-02 ASSESSMENT — PAIN SCALES - GENERAL: PAINLEVEL: NO PAIN (0)

## 2023-02-02 ASSESSMENT — ACTIVITIES OF DAILY LIVING (ADL): CURRENT_FUNCTION: NO ASSISTANCE NEEDED

## 2023-02-02 NOTE — PROGRESS NOTES
"SUBJECTIVE:   Jerry is a 77 year old who presents for Preventive Visit.       Patient comes for an annual exam.  He reports one of his daughter being tested positive for TB, likely latent.    She has no symptoms.    He does not have any symptoms.    History of seasonal depression disorder, he has been on duloxetine 30 mg daily for the past 2 years or so.  He has been getting nightmares and dreams.   .  He denies any depression, denies suicidal Gregory ideation, no alcohol abuse.  No anxiety.    Patient has been advised of split billing requirements and indicates understanding: Yes  Are you in the first 12 months of your Medicare coverage?  No    Healthy Habits:     In general, how would you rate your overall health?  Good    Frequency of exercise:  4-5 days/week    Duration of exercise:  30-45 minutes    Do you usually eat at least 4 servings of fruit and vegetables a day, include whole grains    & fiber and avoid regularly eating high fat or \"junk\" foods?  Yes    Taking medications regularly:  Yes    Barriers to taking medications:  None    Medication side effects:  None    Ability to successfully perform activities of daily living:  No assistance needed    Home Safety:  No safety concerns identified    Hearing Impairment:  No hearing concerns    In the past 6 months, have you been bothered by leaking of urine?  No    In general, how would you rate your overall mental or emotional health?  Fair      PHQ-2 Total Score: 1    Additional concerns today:  No      Have you ever done Advance Care Planning? (For example, a Health Directive, POLST, or a discussion with a medical provider or your loved ones about your wishes): Yes, advance care planning is on file.       Fall risk  Fallen 2 or more times in the past year?: No  Any fall with injury in the past year?: No    Cognitive Screening   1) Repeat 3 items (Leader, Season, Table)    2) Clock draw: NORMAL  3) 3 item recall: Recalls 3 objects  Results: 3 items recalled: " COGNITIVE IMPAIRMENT LESS LIKELY    Mini-CogTM Copyright RANDI Murillo. Licensed by the author for use in [facility]; reprinted with permission (ana@.[facility]). All rights reserved.      Do you have sleep apnea, excessive snoring or daytime drowsiness?: no    Reviewed and updated as needed this visit by clinical staff   Tobacco  Allergies    Med Hx  Surg Hx  Fam Hx  Soc Hx        Reviewed and updated as needed this visit by Provider                 Social History     Tobacco Use     Smoking status: Former     Packs/day: 1.00     Years: 10.00     Pack years: 10.00     Types: Cigarettes     Quit date: 1973     Years since quittin.0     Smokeless tobacco: Never   Substance Use Topics     Alcohol use: Not Currently     If you drink alcohol do you typically have >3 drinks per day or >7 drinks per week? No    Alcohol Use 2023   Prescreen: >3 drinks/day or >7 drinks/week? Not Applicable   Prescreen: >3 drinks/day or >7 drinks/week? -   No flowsheet data found.      Current providers sharing in care for this patient include:   Patient Care Team:  Amaury Lyon MD as PCP - General (Family Practice)  Francoise Morales MD as MD (Ophthalmology)  Manuel Ponce MD as MD (Orthopaedic Surgery)  Jeny Hobbs PA-C as Physician Assistant (Dermatology)  Jesus Rodriguez MD as Assigned Musculoskeletal Provider  Amaury Lyon MD as Assigned PCP  Jesus Echavarria MD as Assigned Surgical Provider    The following health maintenance items are reviewed in Epic and correct as of today:  Health Maintenance   Topic Date Due     URINE DRUG SCREEN  Never done     MEDICARE ANNUAL WELLNESS VISIT  2023     LIPID  2023     MICROALBUMIN  2023     BMP  2023     ANNUAL REVIEW OF HM ORDERS  2023     HEMOGLOBIN  2023     FALL RISK ASSESSMENT  2024     DTAP/TDAP/TD IMMUNIZATION (2 - Td or Tdap) 2024     COLORECTAL CANCER SCREENING   06/28/2025     ADVANCE CARE PLANNING  09/15/2027     HEPATITIS C SCREENING  Completed     PHQ-2 (once per calendar year)  Completed     INFLUENZA VACCINE  Completed     Pneumococcal Vaccine: 65+ Years  Completed     URINALYSIS  Completed     ZOSTER IMMUNIZATION  Completed     AORTIC ANEURYSM SCREENING (SYSTEM ASSIGNED)  Completed     COVID-19 Vaccine  Completed     IPV IMMUNIZATION  Aged Out     MENINGITIS IMMUNIZATION  Aged Out     Lab work is in process  Labs reviewed in EPIC  BP Readings from Last 3 Encounters:   02/02/23 132/76   10/20/22 128/76   09/23/22 131/78    Wt Readings from Last 3 Encounters:   02/02/23 90.3 kg (199 lb)   10/20/22 86 kg (189 lb 9.6 oz)   09/15/22 89.1 kg (196 lb 6.9 oz)              Review of Systems   Constitutional: Negative for chills and fever.   HENT: Negative for congestion, ear pain, hearing loss and sore throat.    Eyes: Positive for visual disturbance. Negative for pain.   Respiratory: Negative for cough and shortness of breath.    Cardiovascular: Negative for chest pain, palpitations and peripheral edema.   Gastrointestinal: Positive for heartburn. Negative for abdominal pain, constipation, diarrhea, hematochezia and nausea.   Endocrine: Negative.    Genitourinary: Negative for dysuria, frequency, genital sores, hematuria, impotence, penile discharge and urgency.   Musculoskeletal: Positive for arthralgias, joint swelling and myalgias.   Skin: Negative for rash.   Allergic/Immunologic: Negative.    Neurological: Negative for dizziness, weakness, headaches and paresthesias.   Hematological: Negative.    Psychiatric/Behavioral: Positive for mood changes. The patient is nervous/anxious.      Constitutional, HEENT, cardiovascular, pulmonary, GI, , musculoskeletal, neuro, skin, endocrine and psych systems are negative, except as otherwise noted.    OBJECTIVE:   /76 (BP Location: Right arm, Patient Position: Chair, Cuff Size: Adult Regular)   Pulse 65   Temp 97.8  F (36.6  " C) (Tympanic)   Resp 26   Ht 1.78 m (5' 10.08\")   Wt 90.3 kg (199 lb)   SpO2 99%   BMI 28.49 kg/m   Estimated body mass index is 28.49 kg/m  as calculated from the following:    Height as of this encounter: 1.78 m (5' 10.08\").    Weight as of this encounter: 90.3 kg (199 lb).  Physical Exam  GENERAL: healthy, alert and no distress  EYES: Eyes grossly normal to inspection, PERRL and conjunctivae and sclerae normal  HENT: ear canals and TM's normal, nose and mouth without ulcers or lesions  NECK: no adenopathy, no asymmetry, masses, or scars and thyroid normal to palpation  RESP: lungs clear to auscultation - no rales, rhonchi or wheezes  CV: regular rate and rhythm, normal S1 S2, no S3 or S4, no murmur, click or rub, no peripheral edema and peripheral pulses strong  ABDOMEN: soft, nontender, no hepatosplenomegaly, no masses and bowel sounds normal  MS: no gross musculoskeletal defects noted, no edema  SKIN: no suspicious lesions or rashes  NEURO: Normal strength and tone, mentation intact and speech normal  PSYCH: mentation appears normal, affect normal/bright    Diagnostic Test Results:  Labs reviewed in Epic  Orders Placed This Encounter   Procedures     Lipid panel reflex to direct LDL Non-fasting     Albumin Random Urine Quantitative with Creat Ratio     PSA, screen     Comprehensive metabolic panel (BMP + Alb, Alk Phos, ALT, AST, Total. Bili, TP)     Quantiferon TB Gold Plus       ASSESSMENT / PLAN:   (Z00.00) Encounter for Medicare annual wellness exam  (primary encounter diagnosis)  Comment: normal exam  Plan: Comprehensive metabolic panel (BMP + Alb, Alk         Phos, ALT, AST, Total. Bili, TP)        Routine preventative care discussed.  Advised with diet, exercise, increase physical activity.  Fall risk precaution discussed.  One  year annual exam follow-up recommended    (E78.5) Hyperlipidemia LDL goal <130  Comment:   Plan: Lipid panel reflex to direct LDL Non-fasting,         Albumin Random Urine " "Quantitative with Creat         Ratio, rosuvastatin (CRESTOR) 10 MG tablet        Continue with current medicine    (F33.8) Seasonal depression (H)  Comment:   Plan: DULoxetine (CYMBALTA) 20 MG capsule        Wean down Duloxetine.  Discussed with patient if he have a recurrent symptoms then he will call the office and restart other medication.    (Z12.5) Screening for prostate cancer  Comment:   Plan: PSA, screen        screening    (Z11.1) Screening examination for pulmonary tuberculosis  Comment:   Plan: Quantiferon TB Gold Plus        No symptoms.    (K21.00) Gastroesophageal reflux disease with esophagitis without hemorrhage  Comment:   Plan: esomeprazole (NEXIUM) 40 MG DR capsule        Take as needed  Discussed diet modifications.    (N40.1) Benign non-nodular prostatic hyperplasia with lower urinary tract symptoms  Comment:   Plan: finasteride (PROSCAR) 5 MG tablet            (I10) Essential hypertension with goal blood pressure less than 140/90  Comment:   Plan: valsartan (DIOVAN) 80 MG tablet            (N18.2) Stage 2 chronic kidney disease  Comment:   Plan: valsartan (DIOVAN) 80 MG tablet        Stable,CMP today      Patient has been advised of split billing requirements and indicates understanding: Yes      COUNSELING:  Reviewed preventive health counseling, as reflected in patient instructions       Regular exercise       Healthy diet/nutrition       Fall risk prevention       Prostate cancer screening      BMI:   Estimated body mass index is 28.49 kg/m  as calculated from the following:    Height as of this encounter: 1.78 m (5' 10.08\").    Weight as of this encounter: 90.3 kg (199 lb).   Weight management plan: Discussed healthy diet and exercise guidelines      He reports that he quit smoking about 50 years ago. His smoking use included cigarettes. He has a 10.00 pack-year smoking history. He has never used smokeless tobacco.      Appropriate preventive services were discussed with this patient, " including applicable screening as appropriate for cardiovascular disease, diabetes, osteopenia/osteoporosis, and glaucoma.  As appropriate for age/gender, discussed screening for colorectal cancer, prostate cancer, breast cancer, and cervical cancer. Checklist reviewing preventive services available has been given to the patient.    Reviewed patients plan of care and provided an AVS. The Basic Care Plan (routine screening as documented in Health Maintenance) for Jerry meets the Care Plan requirement. This Care Plan has been established and reviewed with the Patient.          Mira Harry MD  [facility]    Identified Health Risks:

## 2023-02-02 NOTE — PATIENT INSTRUCTIONS
Patient Education   Personalized Prevention Plan  You are due for the preventive services outlined below.  Your care team is available to assist you in scheduling these services.  If you have already completed any of these items, please share that information with your care team to update in your medical record.  Health Maintenance Due   Topic Date Due     URINE DRUG SCREEN  Never done     Annual Wellness Visit  02/03/2023     Cholesterol Lab  02/03/2023     Kidney Microalbumin Urine Test  02/03/2023     Basic Metabolic Panel  02/28/2023

## 2023-02-03 ENCOUNTER — TELEPHONE (OUTPATIENT)
Dept: FAMILY MEDICINE | Facility: CLINIC | Age: 78
End: 2023-02-03

## 2023-02-03 LAB
ALBUMIN SERPL BCG-MCNC: 4.8 G/DL (ref 3.5–5.2)
ALP SERPL-CCNC: 78 U/L (ref 40–129)
ALT SERPL W P-5'-P-CCNC: 18 U/L (ref 10–50)
ANION GAP SERPL CALCULATED.3IONS-SCNC: 12 MMOL/L (ref 7–15)
AST SERPL W P-5'-P-CCNC: 27 U/L (ref 10–50)
BILIRUB SERPL-MCNC: 0.4 MG/DL
BUN SERPL-MCNC: 22.9 MG/DL (ref 8–23)
CALCIUM SERPL-MCNC: 9.9 MG/DL (ref 8.8–10.2)
CHLORIDE SERPL-SCNC: 102 MMOL/L (ref 98–107)
CHOLEST SERPL-MCNC: 203 MG/DL
CREAT SERPL-MCNC: 1.05 MG/DL (ref 0.67–1.17)
CREAT UR-MCNC: 130 MG/DL
DEPRECATED HCO3 PLAS-SCNC: 28 MMOL/L (ref 22–29)
GFR SERPL CREATININE-BSD FRML MDRD: 74 ML/MIN/1.73M2
GLUCOSE SERPL-MCNC: 96 MG/DL (ref 70–99)
HDLC SERPL-MCNC: 62 MG/DL
LDLC SERPL CALC-MCNC: 118 MG/DL
MICROALBUMIN UR-MCNC: <12 MG/L
MICROALBUMIN/CREAT UR: NORMAL MG/G{CREAT}
NONHDLC SERPL-MCNC: 141 MG/DL
POTASSIUM SERPL-SCNC: 4.7 MMOL/L (ref 3.4–5.3)
PROT SERPL-MCNC: 7.4 G/DL (ref 6.4–8.3)
PSA SERPL-MCNC: 0.88 NG/ML (ref 0–6.5)
SODIUM SERPL-SCNC: 142 MMOL/L (ref 136–145)
TRIGL SERPL-MCNC: 117 MG/DL

## 2023-02-03 NOTE — TELEPHONE ENCOUNTER
PRIOR AUTHORIZATION DENIED    Medication: hydrOXYzine (VISTARIL) 25 MG capsule - EPA DENIED     Denial Date: 2/2/2023    Denial Rational:       Appeal Information:

## 2023-02-03 NOTE — TELEPHONE ENCOUNTER
Central Prior Authorization Team   Phone: [phone]      [facility] DENIED

## 2023-02-04 LAB
GAMMA INTERFERON BACKGROUND BLD IA-ACNC: 0.04 IU/ML
M TB IFN-G BLD-IMP: NEGATIVE
M TB IFN-G CD4+ BCKGRND COR BLD-ACNC: 9.96 IU/ML
MITOGEN IGNF BCKGRD COR BLD-ACNC: 0.1 IU/ML
MITOGEN IGNF BCKGRD COR BLD-ACNC: 0.11 IU/ML
QUANTIFERON MITOGEN: 10 IU/ML
QUANTIFERON NIL TUBE: 0.04 IU/ML
QUANTIFERON TB1 TUBE: 0.15 IU/ML
QUANTIFERON TB2 TUBE: 0.14

## 2023-02-07 ENCOUNTER — TELEPHONE (OUTPATIENT)
Dept: FAMILY MEDICINE | Facility: CLINIC | Age: 78
End: 2023-02-07
Payer: COMMERCIAL

## 2023-02-07 NOTE — TELEPHONE ENCOUNTER
[facility] .  Forms placed in provider 'sign me' folder.  Please FAx forms to [phone] after completion.      Ana Cristina Milan    [facility]

## 2023-02-22 ENCOUNTER — TELEPHONE (OUTPATIENT)
Dept: FAMILY MEDICINE | Facility: CLINIC | Age: 78
End: 2023-02-22
Payer: COMMERCIAL

## 2023-02-22 DIAGNOSIS — F33.8 SEASONAL DEPRESSION (H): ICD-10-CM

## 2023-02-22 RX ORDER — DULOXETIN HYDROCHLORIDE 20 MG/1
CAPSULE, DELAYED RELEASE ORAL
Qty: 90 CAPSULE | Refills: 3 | Status: SHIPPED | OUTPATIENT
Start: 2023-02-22 | End: 2023-11-09 | Stop reason: DRUGHIGH

## 2023-02-22 NOTE — TELEPHONE ENCOUNTER
"Routing to Dr. Harry for sig clarification on cymbalta RX that was sent 2/20.    It currently states: \" one tab daily for 2 weeks, then one tab every other day until gone\" this sig was already used in previous RX    Do you want it to state: \"one tab every day\" or \"one tab every other day\"?      Heather Vang RN    "

## 2023-02-22 NOTE — TELEPHONE ENCOUNTER
Please clarify the quantity to dispense and directions for Cymbalta EC/DR cap 20 mg. The patient already decreased down to 1 cap every other day on the script from 2/2/2023.

## 2023-02-24 RX ORDER — CHOLECALCIFEROL (VITAMIN D3) 1250 MCG
CAPSULE ORAL
Qty: 12 CAPSULE | Refills: 0 | OUTPATIENT
Start: 2023-02-24

## 2023-05-16 ENCOUNTER — IMMUNIZATION (OUTPATIENT)
Dept: FAMILY MEDICINE | Facility: CLINIC | Age: 78
End: 2023-05-16
Payer: COMMERCIAL

## 2023-05-16 DIAGNOSIS — Z23 HIGH PRIORITY FOR 2019-NCOV VACCINE: Primary | ICD-10-CM

## 2023-05-16 PROCEDURE — 91312 COVID-19 BIVALENT 12+ (PFIZER): CPT

## 2023-05-16 PROCEDURE — 99207 PR NO CHARGE LOS: CPT

## 2023-05-16 PROCEDURE — 0124A COVID-19 BIVALENT 12+ (PFIZER): CPT

## 2023-07-05 ENCOUNTER — MYC REFILL (OUTPATIENT)
Dept: ORTHOPEDICS | Facility: CLINIC | Age: 78
End: 2023-07-05
Payer: COMMERCIAL

## 2023-07-05 DIAGNOSIS — Z96.652 STATUS POST TOTAL LEFT KNEE REPLACEMENT: Primary | ICD-10-CM

## 2023-07-05 RX ORDER — AMOXICILLIN 500 MG/1
2000 CAPSULE ORAL
Qty: 4 CAPSULE | Refills: 3 | Status: SHIPPED | OUTPATIENT
Start: 2023-07-05

## 2023-10-25 ENCOUNTER — TRANSFERRED RECORDS (OUTPATIENT)
Dept: HEALTH INFORMATION MANAGEMENT | Facility: CLINIC | Age: 78
End: 2023-10-25
Payer: COMMERCIAL

## 2023-11-09 ENCOUNTER — OFFICE VISIT (OUTPATIENT)
Dept: FAMILY MEDICINE | Facility: CLINIC | Age: 78
End: 2023-11-09
Payer: COMMERCIAL

## 2023-11-09 VITALS
WEIGHT: 216.2 LBS | OXYGEN SATURATION: 97 % | RESPIRATION RATE: 16 BRPM | SYSTOLIC BLOOD PRESSURE: 130 MMHG | BODY MASS INDEX: 30.95 KG/M2 | TEMPERATURE: 97.5 F | HEART RATE: 72 BPM | HEIGHT: 70 IN | DIASTOLIC BLOOD PRESSURE: 84 MMHG

## 2023-11-09 DIAGNOSIS — Z01.818 PRE-OP EXAM: Primary | ICD-10-CM

## 2023-11-09 DIAGNOSIS — I10 ESSENTIAL HYPERTENSION WITH GOAL BLOOD PRESSURE LESS THAN 140/90: ICD-10-CM

## 2023-11-09 DIAGNOSIS — H26.9 CATARACT OF BOTH EYES, UNSPECIFIED CATARACT TYPE: ICD-10-CM

## 2023-11-09 DIAGNOSIS — E05.90 HYPERTHYROIDISM: ICD-10-CM

## 2023-11-09 PROCEDURE — 99214 OFFICE O/P EST MOD 30 MIN: CPT | Performed by: NURSE PRACTITIONER

## 2023-11-09 RX ORDER — TRAZODONE HYDROCHLORIDE 50 MG/1
50 TABLET, FILM COATED ORAL AT BEDTIME
COMMUNITY
End: 2024-05-21

## 2023-11-09 RX ORDER — RESPIRATORY SYNCYTIAL VIRUS VACCINE 120MCG/0.5
0.5 KIT INTRAMUSCULAR ONCE
Qty: 1 EACH | Refills: 0 | Status: CANCELLED | OUTPATIENT
Start: 2023-11-09 | End: 2023-11-09

## 2023-11-09 RX ORDER — DULOXETIN HYDROCHLORIDE 30 MG/1
20 CAPSULE, DELAYED RELEASE ORAL 2 TIMES DAILY
COMMUNITY

## 2023-11-09 ASSESSMENT — PAIN SCALES - GENERAL: PAINLEVEL: NO PAIN (0)

## 2023-11-09 NOTE — PROGRESS NOTES
[address] [address]  Phone: [phone]  Primary Provider: No Ref-Primary, Physician  Pre-op Performing Provider: ROBBIN MARIE      PREOPERATIVE EVALUATION:  Today's date: 11/9/2023    Jerry is a 78 year old male who presents for a preoperative evaluation.      11/9/2023     9:54 AM   Additional Questions   Roomed by Lily CORTEZ       Surgical Information:  Surgery/Procedure: Cataract   Surgery Location: [facility]  Surgeon: Dr. Willson   Surgery Date: 11/15/23, & 12/01/2023  Time of Surgery: 10:45 on 11/15/2023  Where patient plans to recover: At home with family  Fax number for surgical facility: [phone]    Assessment & Plan     The proposed surgical procedure is considered LOW risk.    Pre-op exam    Cataract of both eyes, unspecified cataract type  Reason for surgery.    Essential hypertension with goal blood pressure less than 140/90  Known issue that I take into account for their medical decisions, no current exacerbations or new concerns.     Hyperthyroidism, history of  Known issue that I take into account for their medical decisions, no current exacerbations or new concerns.  TSH has been normal for years.            - No identified additional risk factors other than previously addressed    Antiplatelet or Anticoagulation Medication Instructions:   - Bleeding risk is low for this procedure (e.g. dental, skin, cataract).   - aspirin: Discontinue aspirin 7-10 days prior to procedure to reduce bleeding risk. It should be resumed postoperatively.     Additional Medication Instructions:   - ACE/ARB: Continue without modification (e.g., MAC anesthesia, neurosurgery, spine surgery, heart failure, or labile hypertension with risk of hypertension).   - Statins: Continue taking on the day of surgery.    - SSRIs, SNRIs, TCAs, Antipsychotics: Continue without modification.       RECOMMENDATION:  APPROVAL GIVEN to proceed with  proposed procedure, without further diagnostic evaluation.            Subjective       HPI related to upcoming procedure:     Wife  a month ago, she was on hospice, struggled with connective tissue issues, Alzheimers    2 daughters nearby  1 granddaughter at [facility]        2023     9:25 AM   Preop Questions   1. Have you ever had a heart attack or stroke? No   2. Have you ever had surgery on your heart or blood vessels, such as a stent placement, a coronary artery bypass, or surgery on an artery in your head, neck, heart, or legs? No   3. Do you have chest pain with activity? No   4. Do you have a history of  heart failure? No   5. Do you currently have a cold, bronchitis or symptoms of other infection? No   6. Do you have a cough, shortness of breath, or wheezing? No   7. Do you or anyone in your family have previous history of blood clots? No   8. Do you or does anyone in your family have a serious bleeding problem such as prolonged bleeding following surgeries or cuts? No   9. Have you ever had problems with anemia or been told to take iron pills? No   10. Have you had any abnormal blood loss such as black, tarry or bloody stools? No   11. Have you ever had a blood transfusion? No   12. Are you willing to have a blood transfusion if it is medically needed before, during, or after your surgery? Yes   13. Have you or any of your relatives ever had problems with anesthesia? No   14. Do you have sleep apnea, excessive snoring or daytime drowsiness? No   15. Do you have any artifical heart valves or other implanted medical devices like a pacemaker, defibrillator, or continuous glucose monitor? No   16. Do you have artificial joints? YES - both knees    17. Are you allergic to latex? No       Health Care Directive:  Patient has a Health Care Directive on file      Preoperative Review of :   reviewed - no record of controlled substances prescribed.      Status of Chronic Conditions:  See problem list for  active medical problems.  Problems all longstanding and stable, except as noted/documented.  See ROS for pertinent symptoms related to these conditions.    Review of Systems  Constitutional, neuro, ENT, endocrine, pulmonary, cardiac, gastrointestinal, genitourinary, musculoskeletal, integument and psychiatric systems are negative, except as otherwise noted.    Patient Active Problem List    Diagnosis Date Noted    Arthritis of knee 09/15/2022     Priority: Medium    Age-related osteoporosis without current pathological fracture 09/15/2022     Priority: Medium    Chronic pain of left knee 09/15/2022     Priority: Medium    Personal history of other endocrine, nutritional and metabolic disease 09/15/2022     Priority: Medium    Nuclear senile cataract of both eyes 12/13/2019     Priority: Medium    Seasonal depression (H24) 09/19/2019     Priority: Medium    Right knee DJD 01/04/2016     Priority: Medium    Essential hypertension with goal blood pressure less than 140/90 07/06/2012     Priority: Medium    Osteoarthritis of knee 06/08/2012     Priority: Medium    CARDIOVASCULAR SCREENING; LDL GOAL LESS THAN 130 06/08/2012     Priority: Medium    Seasonal allergic rhinitis 06/08/2012     Priority: Medium    Hyperlipidemia LDL goal <130 06/08/2012     Priority: Medium    Benign non-nodular prostatic hyperplasia with lower urinary tract symptoms 06/08/2012     Priority: Medium    GERD (gastroesophageal reflux disease) 06/08/2012     Priority: Medium    Hyperthyroidism 06/08/2012     Priority: Medium      Past Medical History:   Diagnosis Date    Arthritis     knee    BPH (benign prostatic hyperplasia)     Chronic prostatitis     Gastro-oesophageal reflux disease     Hypertension     Kidney stone     Shingles     Thyroid disease 2001-about    h/o hyperthyroid,treated, now normal     Past Surgical History:   Procedure Laterality Date    APPENDECTOMY      ARTHROPLASTY KNEE Right 1/4/2016    Procedure: ARTHROPLASTY KNEE;   Surgeon: Manuel Ponce MD;  Location: [facility] OR    ARTHROPLASTY KNEE Left 9/15/2022    Procedure: ARTHROPLASTY, KNEE, TOTAL LEFT;  Surgeon: Jesus Rodriguez MD;  Location: [facility] OR    COLONOSCOPY      COLONOSCOPY WITH CO2 INSUFFLATION N/A 3/2/2018    Procedure: COLONOSCOPY WITH CO2 INSUFFLATION;  Colonoscopy, Constipation, unspecified constipation type Recent change in frequency of bowel movements, [place], BMI 31.52, [facility] [place];  Surgeon: Edith Craig MD;  Location: [facility] OR    ENT SURGERY      tonsillectomy    LASER HOLMIUM LITHOTRIPSY URETER(S), INSERT STENT, COMBINED  10/11/2012    Procedure: COMBINED CYSTOSCOPY, URETEROSCOPY, LASER HOLMIUM LITHOTRIPSY URETER(S), INSERT STENT;  CYSTOSCOPY, ATTEMPTED RIGHT URETEROSCOPY, INSERT RIGHT URETERAL STENT, Laser [place];  Surgeon: Petar Ulrich MD;  Location[facility]     Current Outpatient Medications   Medication Sig Dispense Refill    acetaminophen (TYLENOL) 325 MG tablet Take 2 tablets (650 mg) by mouth every 4 hours as needed for other (mild pain) 100 tablet 0    acetaminophen (TYLENOL) 650 MG CR tablet Take 1,300 mg by mouth nightly as needed for mild pain      aspirin 81 MG EC tablet Take 1 tablet (81 mg) by mouth 2 times daily 60 tablet 0    DULoxetine (CYMBALTA) 30 MG capsule Take 30 mg by mouth 2 times daily      esomeprazole (NEXIUM) 40 MG DR capsule TAKE 1 Capsule daily as needed,  MOUTH 30  TO 60 MINUTES BEFORE EATING 90 capsule 3    finasteride (PROSCAR) 5 MG tablet Take 1 tablet (5 mg) by mouth daily 90 tablet 3    fluticasone (VERAMYST) 27.5 MCG/SPRAY spray Spray 1 spray into both nostrils daily as needed      rosuvastatin (CRESTOR) 10 MG tablet Take 1 tablet (10 mg) by mouth daily 90 tablet 3    traZODone (DESYREL) 50 MG tablet Take 50 mg by mouth at bedtime 0.5 tablets nightly as needed      valsartan (DIOVAN) 80 MG tablet Take 1 tablet (80 mg) by mouth daily 90 tablet 3    VITAMIN D, CHOLECALCIFEROL, PO Take 400 Units by mouth daily    "   amoxicillin (AMOXIL) 500 MG capsule Take 4 capsules (2,000 mg) by mouth once as needed (Take 1 hour prior to dental procedure) (Patient not taking: Reported on 2023) 4 capsule 3       Allergies   Allergen Reactions    Codeine Sulfate Nausea and Vomiting        Social History     Tobacco Use    Smoking status: Former     Packs/day: 1.00     Years: 10.00     Additional pack years: 0.00     Total pack years: 10.00     Types: Cigarettes     Quit date: 1973     Years since quittin.8    Smokeless tobacco: Never   Substance Use Topics    Alcohol use: Not Currently     Family History   Problem Relation Age of Onset    Cancer Mother         ovarian    Cancer Father         lung    Heart Disease Father         possibly    Cancer Paternal Grandfather         esophageal cancer    Cancer Brother 68        prostate cancer    Diabetes Maternal Grandmother     Breast Cancer No family hx of     Cancer - colorectal No family hx of     Coronary Artery Disease No family hx of     Hypertension No family hx of     Hyperlipidemia No family hx of     Cerebrovascular Disease No family hx of     Colon Cancer No family hx of     Prostate Cancer No family hx of     Other Cancer No family hx of     Depression No family hx of     Anxiety Disorder No family hx of     Mental Illness No family hx of     Substance Abuse No family hx of     Anesthesia Reaction No family hx of     Asthma No family hx of     Osteoporosis No family hx of     Genetic Disorder No family hx of     Thyroid Disease No family hx of     Obesity No family hx of     Unknown/Adopted No family hx of     Macular Degeneration No family hx of     Glaucoma No family hx of      History   Drug Use No         Objective     /84 (BP Location: Right arm, Patient Position: Sitting, Cuff Size: Adult Large)   Pulse 72   Temp 97.5  F (36.4  C) (Oral)   Resp 16   Ht 1.778 m (5' 10\")   Wt 98.1 kg (216 lb 3.2 oz)   SpO2 97%   BMI 31.02 kg/m      Physical Exam    " GENERAL APPEARANCE: healthy, alert and no distress     EYES: EOMI,  PERRL     HENT: ear canals and TM's normal and nose and mouth without ulcers or lesions     NECK: no adenopathy, no asymmetry, masses, or scars and thyroid normal to palpation     RESP: lungs clear to auscultation - no rales, rhonchi or wheezes     CV: regular rates and rhythm, normal S1 S2, no S3 or S4 and no murmur, click or rub     ABDOMEN:  soft, nontender, no HSM or masses and bowel sounds normal     NEURO: Normal strength and tone, sensory exam grossly normal, mentation intact and speech normal     PSYCH: mentation appears normal. and affect normal/bright     LYMPHATICS: No cervical adenopathy    Recent Labs   Lab Test 02/02/23  1711 09/17/22  0618 09/16/22  0513 09/15/22  1144 08/30/22  1042 08/05/22  1702 02/03/22  1542   HGB  --  12.2* 12.3* 13.6 14.5  --    < >   PLT  --   --   --  160  --   --   --      --   --   --  141  --   --    POTASSIUM 4.7  --   --   --  4.1  --   --    CR 1.05  --   --   --  1.12  --   --    A1C  --   --   --   --   --  5.4  --     < > = values in this interval not displayed.        Diagnostics:  No labs were ordered during this visit.   No EKG required for low risk surgery (cataract, skin procedure, breast biopsy, etc).    Revised Cardiac Risk Index (RCRI):  The patient has the following serious cardiovascular risks for perioperative complications:   - No serious cardiac risks = 0 points     RCRI Interpretation: 0 points: Class I (very low risk - 0.4% complication rate)         Signed Electronically by: Sobia Yeung NP  Copy of this evaluation report is provided to requesting physician.

## 2023-11-21 DIAGNOSIS — E78.5 HYPERLIPIDEMIA LDL GOAL <130: ICD-10-CM

## 2023-11-21 RX ORDER — ROSUVASTATIN CALCIUM 10 MG/1
10 TABLET, COATED ORAL DAILY
Qty: 90 TABLET | Refills: 0 | Status: SHIPPED | OUTPATIENT
Start: 2023-11-21 | End: 2024-04-15

## 2024-02-07 DIAGNOSIS — N40.1 BENIGN NON-NODULAR PROSTATIC HYPERPLASIA WITH LOWER URINARY TRACT SYMPTOMS: ICD-10-CM

## 2024-02-07 RX ORDER — FINASTERIDE 5 MG/1
1 TABLET, FILM COATED ORAL DAILY
Qty: 90 TABLET | Refills: 2 | Status: SHIPPED | OUTPATIENT
Start: 2024-02-07 | End: 2024-10-01

## 2024-03-16 DIAGNOSIS — N18.2 STAGE 2 CHRONIC KIDNEY DISEASE: ICD-10-CM

## 2024-03-16 DIAGNOSIS — I10 ESSENTIAL HYPERTENSION WITH GOAL BLOOD PRESSURE LESS THAN 140/90: ICD-10-CM

## 2024-03-18 RX ORDER — VALSARTAN 80 MG/1
80 TABLET ORAL DAILY
Qty: 90 TABLET | Refills: 0 | Status: SHIPPED | OUTPATIENT
Start: 2024-03-18 | End: 2024-04-29

## 2024-03-18 NOTE — TELEPHONE ENCOUNTER
Called patient and left a voicemail message to call the clinic and schedule a medicare wellness/ med check appointment.      Almita Garcia

## 2024-03-27 ENCOUNTER — TELEPHONE (OUTPATIENT)
Dept: FAMILY MEDICINE | Facility: CLINIC | Age: 79
End: 2024-03-27
Payer: COMMERCIAL

## 2024-03-27 NOTE — TELEPHONE ENCOUNTER
Patient Quality Outreach    Patient is due for the following:   Physical Annual Wellness Visit      Next Steps:   Schedule a Annual Wellness Visit    Type of outreach:    Sent [employer] message. and Sent letter.      Questions for provider review:    None           Vivi Morris CMA  Chart routed to Care Team.

## 2024-03-27 NOTE — LETTER
March 27, 2024      Jerry Miguel  [address] [place] [address]          Dear Jerry Miguel,    Your clinic record indicates that you are due for a Medicare Annual Wellness Visit with Cholesterol Testing. You must make an appointment to continue getting your medications refilled. Please call the  at [phone] to schedule an appointment.     If we indicated that you are due for cholesterol testing, please come in fasting for twelve hours prior to the test.     If you have questions about this letter please contact your provider.    Sincerely,    Your [facility] Team

## 2024-04-14 DIAGNOSIS — E78.5 HYPERLIPIDEMIA LDL GOAL <130: ICD-10-CM

## 2024-04-15 RX ORDER — ROSUVASTATIN CALCIUM 10 MG/1
10 TABLET, COATED ORAL DAILY
Qty: 90 TABLET | Refills: 0 | Status: SHIPPED | OUTPATIENT
Start: 2024-04-15 | End: 2024-05-21

## 2024-04-27 ENCOUNTER — HEALTH MAINTENANCE LETTER (OUTPATIENT)
Age: 79
End: 2024-04-27

## 2024-04-27 DIAGNOSIS — N18.2 STAGE 2 CHRONIC KIDNEY DISEASE: ICD-10-CM

## 2024-04-27 DIAGNOSIS — I10 ESSENTIAL HYPERTENSION WITH GOAL BLOOD PRESSURE LESS THAN 140/90: ICD-10-CM

## 2024-04-29 RX ORDER — VALSARTAN 80 MG/1
80 TABLET ORAL DAILY
Qty: 30 TABLET | Refills: 0 | Status: SHIPPED | OUTPATIENT
Start: 2024-04-29 | End: 2024-05-21

## 2024-05-05 DIAGNOSIS — K21.00 GASTROESOPHAGEAL REFLUX DISEASE WITH ESOPHAGITIS WITHOUT HEMORRHAGE: ICD-10-CM

## 2024-05-06 RX ORDER — ESOMEPRAZOLE MAGNESIUM 40 MG/1
CAPSULE, DELAYED RELEASE ORAL
Qty: 90 CAPSULE | Refills: 0 | Status: SHIPPED | OUTPATIENT
Start: 2024-05-06 | End: 2024-05-21

## 2024-05-14 SDOH — HEALTH STABILITY: PHYSICAL HEALTH: ON AVERAGE, HOW MANY DAYS PER WEEK DO YOU ENGAGE IN MODERATE TO STRENUOUS EXERCISE (LIKE A BRISK WALK)?: 5 DAYS

## 2024-05-14 ASSESSMENT — SOCIAL DETERMINANTS OF HEALTH (SDOH): HOW OFTEN DO YOU GET TOGETHER WITH FRIENDS OR RELATIVES?: MORE THAN THREE TIMES A WEEK

## 2024-05-21 ENCOUNTER — OFFICE VISIT (OUTPATIENT)
Dept: FAMILY MEDICINE | Facility: CLINIC | Age: 79
End: 2024-05-21
Payer: COMMERCIAL

## 2024-05-21 VITALS
OXYGEN SATURATION: 97 % | SYSTOLIC BLOOD PRESSURE: 134 MMHG | BODY MASS INDEX: 29.4 KG/M2 | HEART RATE: 78 BPM | HEIGHT: 71 IN | DIASTOLIC BLOOD PRESSURE: 80 MMHG | TEMPERATURE: 97.7 F | WEIGHT: 210 LBS | RESPIRATION RATE: 15 BRPM

## 2024-05-21 DIAGNOSIS — K21.00 GASTROESOPHAGEAL REFLUX DISEASE WITH ESOPHAGITIS WITHOUT HEMORRHAGE: ICD-10-CM

## 2024-05-21 DIAGNOSIS — M25.562 CHRONIC PAIN OF LEFT KNEE: ICD-10-CM

## 2024-05-21 DIAGNOSIS — G89.29 CHRONIC PAIN OF LEFT KNEE: ICD-10-CM

## 2024-05-21 DIAGNOSIS — K22.70 BARRETT'S ESOPHAGUS WITHOUT DYSPLASIA: ICD-10-CM

## 2024-05-21 DIAGNOSIS — E55.9 VITAMIN D DEFICIENCY: ICD-10-CM

## 2024-05-21 DIAGNOSIS — E78.5 HYPERLIPIDEMIA LDL GOAL <130: ICD-10-CM

## 2024-05-21 DIAGNOSIS — F33.8 SEASONAL DEPRESSION (H): ICD-10-CM

## 2024-05-21 DIAGNOSIS — I10 ESSENTIAL HYPERTENSION WITH GOAL BLOOD PRESSURE LESS THAN 140/90: ICD-10-CM

## 2024-05-21 DIAGNOSIS — N18.2 STAGE 2 CHRONIC KIDNEY DISEASE: ICD-10-CM

## 2024-05-21 DIAGNOSIS — R22.2 MASS OF SKIN OF BACK: ICD-10-CM

## 2024-05-21 DIAGNOSIS — Z12.5 ENCOUNTER FOR SCREENING FOR MALIGNANT NEOPLASM OF PROSTATE: ICD-10-CM

## 2024-05-21 DIAGNOSIS — Z00.00 ENCOUNTER FOR MEDICARE ANNUAL WELLNESS EXAM: Primary | ICD-10-CM

## 2024-05-21 PROBLEM — K57.30 DIVERTICULOSIS OF COLON: Status: ACTIVE | Noted: 2024-05-21

## 2024-05-21 LAB
ALBUMIN SERPL BCG-MCNC: 4.6 G/DL (ref 3.5–5.2)
ALP SERPL-CCNC: 75 U/L (ref 40–150)
ALT SERPL W P-5'-P-CCNC: 20 U/L (ref 0–70)
ANION GAP SERPL CALCULATED.3IONS-SCNC: 10 MMOL/L (ref 7–15)
AST SERPL W P-5'-P-CCNC: 25 U/L (ref 0–45)
BASOPHILS # BLD AUTO: 0 10E3/UL (ref 0–0.2)
BASOPHILS NFR BLD AUTO: 1 %
BILIRUB SERPL-MCNC: 0.5 MG/DL
BUN SERPL-MCNC: 18.9 MG/DL (ref 8–23)
CALCIUM SERPL-MCNC: 9.7 MG/DL (ref 8.8–10.2)
CHLORIDE SERPL-SCNC: 106 MMOL/L (ref 98–107)
CHOLEST SERPL-MCNC: 187 MG/DL
CREAT SERPL-MCNC: 1.03 MG/DL (ref 0.67–1.17)
DEPRECATED HCO3 PLAS-SCNC: 27 MMOL/L (ref 22–29)
EGFRCR SERPLBLD CKD-EPI 2021: 75 ML/MIN/1.73M2
EOSINOPHIL # BLD AUTO: 0.1 10E3/UL (ref 0–0.7)
EOSINOPHIL NFR BLD AUTO: 2 %
ERYTHROCYTE [DISTWIDTH] IN BLOOD BY AUTOMATED COUNT: 11.9 % (ref 10–15)
FASTING STATUS PATIENT QL REPORTED: YES
FASTING STATUS PATIENT QL REPORTED: YES
FERRITIN SERPL-MCNC: 92 NG/ML (ref 31–409)
GLUCOSE SERPL-MCNC: 90 MG/DL (ref 70–99)
HCT VFR BLD AUTO: 44.9 % (ref 40–53)
HDLC SERPL-MCNC: 54 MG/DL
HGB BLD-MCNC: 14.5 G/DL (ref 13.3–17.7)
IMM GRANULOCYTES # BLD: 0 10E3/UL
IMM GRANULOCYTES NFR BLD: 0 %
LDLC SERPL CALC-MCNC: 111 MG/DL
LYMPHOCYTES # BLD AUTO: 2.1 10E3/UL (ref 0.8–5.3)
LYMPHOCYTES NFR BLD AUTO: 42 %
MCH RBC QN AUTO: 28.2 PG (ref 26.5–33)
MCHC RBC AUTO-ENTMCNC: 32.3 G/DL (ref 31.5–36.5)
MCV RBC AUTO: 87 FL (ref 78–100)
MONOCYTES # BLD AUTO: 0.5 10E3/UL (ref 0–1.3)
MONOCYTES NFR BLD AUTO: 9 %
NEUTROPHILS # BLD AUTO: 2.4 10E3/UL (ref 1.6–8.3)
NEUTROPHILS NFR BLD AUTO: 46 %
NONHDLC SERPL-MCNC: 133 MG/DL
PLATELET # BLD AUTO: 202 10E3/UL (ref 150–450)
POTASSIUM SERPL-SCNC: 4.4 MMOL/L (ref 3.4–5.3)
PROT SERPL-MCNC: 7.1 G/DL (ref 6.4–8.3)
PSA SERPL DL<=0.01 NG/ML-MCNC: 2.36 NG/ML (ref 0–6.5)
RBC # BLD AUTO: 5.15 10E6/UL (ref 4.4–5.9)
SODIUM SERPL-SCNC: 143 MMOL/L (ref 135–145)
TRIGL SERPL-MCNC: 112 MG/DL
TSH SERPL DL<=0.005 MIU/L-ACNC: 2.45 UIU/ML (ref 0.3–4.2)
VIT B12 SERPL-MCNC: 350 PG/ML (ref 232–1245)
VIT D+METAB SERPL-MCNC: 35 NG/ML (ref 20–50)
WBC # BLD AUTO: 5.1 10E3/UL (ref 4–11)

## 2024-05-21 PROCEDURE — 80053 COMPREHEN METABOLIC PANEL: CPT | Performed by: NURSE PRACTITIONER

## 2024-05-21 PROCEDURE — 99214 OFFICE O/P EST MOD 30 MIN: CPT | Mod: 25 | Performed by: NURSE PRACTITIONER

## 2024-05-21 PROCEDURE — 85025 COMPLETE CBC W/AUTO DIFF WBC: CPT | Performed by: NURSE PRACTITIONER

## 2024-05-21 PROCEDURE — 80061 LIPID PANEL: CPT | Performed by: NURSE PRACTITIONER

## 2024-05-21 PROCEDURE — 36415 COLL VENOUS BLD VENIPUNCTURE: CPT | Performed by: NURSE PRACTITIONER

## 2024-05-21 PROCEDURE — 82306 VITAMIN D 25 HYDROXY: CPT | Performed by: NURSE PRACTITIONER

## 2024-05-21 PROCEDURE — 82607 VITAMIN B-12: CPT | Performed by: NURSE PRACTITIONER

## 2024-05-21 PROCEDURE — G0439 PPPS, SUBSEQ VISIT: HCPCS | Performed by: NURSE PRACTITIONER

## 2024-05-21 PROCEDURE — 82728 ASSAY OF FERRITIN: CPT | Performed by: NURSE PRACTITIONER

## 2024-05-21 PROCEDURE — G0103 PSA SCREENING: HCPCS | Performed by: NURSE PRACTITIONER

## 2024-05-21 PROCEDURE — 84443 ASSAY THYROID STIM HORMONE: CPT | Performed by: NURSE PRACTITIONER

## 2024-05-21 RX ORDER — ESOMEPRAZOLE MAGNESIUM 40 MG/1
CAPSULE, DELAYED RELEASE ORAL
Qty: 90 CAPSULE | Refills: 3 | Status: SHIPPED | OUTPATIENT
Start: 2024-05-21

## 2024-05-21 RX ORDER — VALSARTAN 80 MG/1
80 TABLET ORAL DAILY
Qty: 30 TABLET | Refills: 3 | Status: SHIPPED | OUTPATIENT
Start: 2024-05-21

## 2024-05-21 RX ORDER — RESPIRATORY SYNCYTIAL VIRUS VACCINE 120MCG/0.5
0.5 KIT INTRAMUSCULAR ONCE
Qty: 1 EACH | Refills: 0 | Status: CANCELLED | OUTPATIENT
Start: 2024-05-21 | End: 2024-05-21

## 2024-05-21 RX ORDER — ROSUVASTATIN CALCIUM 10 MG/1
10 TABLET, COATED ORAL DAILY
Qty: 90 TABLET | Refills: 3 | Status: SHIPPED | OUTPATIENT
Start: 2024-05-21

## 2024-05-21 ASSESSMENT — PAIN SCALES - GENERAL: PAINLEVEL: NO PAIN (0)

## 2024-05-21 NOTE — PATIENT INSTRUCTIONS
"    Preventive Care Advice   This is general advice we often give to help people stay healthy. Your care team may have specific advice just for you. Please talk to your care team about your own preventive care needs.  Lifestyle  Exercise at least 150 minutes each week (30 minutes a day, 5 days a week).  Do muscle strengthening activities 2 days a week. These help control your weight and prevent disease.  No smoking.  Wear sunscreen to prevent skin cancer.  Have your home tested for radon every 2 to 5 years. Radon is a colorless, odorless gas that can harm your lungs. To learn more, go to www.[email or web address].[place]. and search for \"Radon in Homes.\"  Keep guns unloaded and locked up in a safe place like a safe or gun vault, or, use a gun lock and hide the keys. Always lock away bullets separately. To learn more, visit [employer].gov and search for \"safe gun storage.\"  Nutrition  Eat 5 or more servings of fruits and vegetables each day.  Try wheat bread, brown rice and whole grain pasta (instead of white bread, rice, and pasta).  Get enough calcium and vitamin D. Check the label on foods and aim for 100% of the RDA (recommended daily allowance).  Regular exams  Have a dental exam and cleaning every 6 months.  See your health care team every year to talk about:  Any changes in your health.  Any medicines your care team has prescribed.  Preventive care, family planning, and ways to prevent chronic diseases.  Shots (vaccines)   HPV shots (up to age 26), if you've never had them before.  Hepatitis B shots (up to age 59), if you've never had them before.  COVID-19 shot: Get this shot when it's due.  Flu shot: Get a flu shot every year.  Tetanus shot: Get a tetanus shot every 10 years.  Pneumococcal, hepatitis A, and RSV shots: Ask your care team if you need these based on your risk.  Shingles shot (for age 50 and up).  General health tests  Diabetes screening:  Starting at age 35, Get screened for diabetes at least every 3 " years.  If you are younger than age 35, ask your care team if you should be screened for diabetes.  Cholesterol test: At age 39, start having a cholesterol test every 5 years, or more often if advised.  Bone density scan (DEXA): At age 50, ask your care team if you should have this scan for osteoporosis (brittle bones).  Hepatitis C: Get tested at least once in your life.  Abdominal aortic aneurysm screening: Talk to your doctor about having this screening if you:  Have ever smoked; and  Are biologically male; and  Are between the ages of 65 and 75.  STIs (sexually transmitted infections)  Before age 24: Ask your care team if you should be screened for STIs.  After age 24: Get screened for STIs if you're at risk. You are at risk for STIs (including HIV) if:  You are sexually active with more than one person.  You don't use condoms every time.  You or a partner was diagnosed with a sexually transmitted infection.  If you are at risk for HIV, ask about PrEP medicine to prevent HIV.  Get tested for HIV at least once in your life, whether you are at risk for HIV or not.  Cancer screening tests  Cervical cancer screening: If you have a cervix, begin getting regular cervical cancer screening tests at age 21. Most people who have regular screenings with normal results can stop after age 65. Talk about this with your provider.  Breast cancer scan (mammogram): If you've ever had breasts, begin having regular mammograms starting at age 40. This is a scan to check for breast cancer.  Colon cancer screening: It is important to start screening for colon cancer at age 45.  Have a colonoscopy test every 10 years (or more often if you're at risk) Or, ask your provider about stool tests like a FIT test every year or Cologuard test every 3 years.  To learn more about your testing options, visit: www.[employer]/524944.pdf.  For help making a decision, visit: erna/cp17990.  Prostate cancer screening test: If you have a prostate and  are age 55 to 69, ask your provider if you would benefit from a yearly prostate cancer screening test.  Lung cancer screening: If you are a current or former smoker age 50 to 80, ask your care team if ongoing lung cancer screenings are right for you.  For informational purposes only. Not to replace the advice of your health care provider. Copyright   2023 [facility]. All rights reserved. Clinically reviewed by the [facility] Transitions Program. [employer] 232228 - REV 04/24.    Preventing Falls: Care Instructions  Injuries and health problems such as trouble walking or poor eyesight can increase your risk of falling. So can some medicines. But there are things you can do to help prevent falls. You can exercise to get stronger. You can also arrange your home to make it safer.    Talk to your doctor about the medicines you take. Ask if any of them increase the risk of falls and whether they can be changed or stopped.   Try to exercise regularly. It can help improve your strength and balance. This can help lower your risk of falling.     Practice fall safety and prevention.    Wear low-heeled shoes that fit well and give your feet good support. Talk to your doctor if you have foot problems that make this hard.  Carry a cellphone or wear a medical alert device that you can use to call for help.  Use stepladders instead of chairs to reach high objects. Don't climb if you're at risk for falls. Ask for help, if needed.  Wear the correct eyeglasses, if you need them.    Make your home safer.    Remove rugs, cords, clutter, and furniture from walkways.  Keep your house well lit. Use night-lights in hallways and bathrooms.  Install and use sturdy handrails on stairways.  Wear nonskid footwear, even inside. Don't walk barefoot or in socks without shoes.    Be safe outside.    Use handrails, curb cuts, and ramps whenever possible.  Keep your hands free by using a shoulder bag or backpack.  Try to walk in  "well-lit areas. Watch out for uneven ground, changes in pavement, and debris.  Be careful in the winter. Walk on the grass or gravel when sidewalks are slippery. Use de-icer on steps and walkways. Add non-slip devices to shoes.    Put grab bars and nonskid mats in your shower or tub and near the toilet. Try to use a shower chair or bath bench when bathing.   Get into a tub or shower by putting in your weaker leg first. Get out with your strong side first. Have a phone or medical alert device in the bathroom with you.   Where can you learn more?  Go to https://www.[employer].net/patiented  Enter G117 in the search box to learn more about \"Preventing Falls: Care Instructions.\"  Current as of: July 17, 2023               Content Version: 14.0    5347-2756 [employer].   Care instructions adapted under license by your healthcare professional. If you have questions about a medical condition or this instruction, always ask your healthcare professional. [employer] disclaims any warranty or liability for your use of this information.      Learning About Stress  What is stress?     Stress is your body's response to a hard situation. Your body can have a physical, emotional, or mental response. Stress is a fact of life for most people, and it affects everyone differently. What causes stress for you may not be stressful for someone else.  A lot of things can cause stress. You may feel stress when you go on a job interview, take a test, or run a race. This kind of short-term stress is normal and even useful. It can help you if you need to work hard or react quickly. For example, stress can help you finish an important job on time.  Long-term stress is caused by ongoing stressful situations or events. Examples of long-term stress include long-term health problems, ongoing problems at work, or conflicts in your family. Long-term stress can harm your health.  How does stress affect your health?  When you " are stressed, your body responds as though you are in danger. It makes hormones that speed up your heart, make you breathe faster, and give you a burst of energy. This is called the fight-or-flight stress response. If the stress is over quickly, your body goes back to normal and no harm is done.  But if stress happens too often or lasts too long, it can have bad effects. Long-term stress can make you more likely to get sick, and it can make symptoms of some diseases worse. If you tense up when you are stressed, you may develop neck, shoulder, or low back pain. Stress is linked to high blood pressure and heart disease.  Stress also harms your emotional health. It can make you oconnor, tense, or depressed. Your relationships may suffer, and you may not do well at work or school.  What can you do to manage stress?  You can try these things to help manage stress:   Do something active. Exercise or activity can help reduce stress. Walking is a great way to get started. Even everyday activities such as housecleaning or yard work can help.  Try yoga or matthew chi. These techniques combine exercise and meditation. You may need some training at first to learn them.  Do something you enjoy. For example, listen to music or go to a movie. Practice your hobby or do volunteer work.  Meditate. This can help you relax, because you are not worrying about what happened before or what may happen in the future.  Do guided imagery. Imagine yourself in any setting that helps you feel calm. You can use online videos, books, or a teacher to guide you.  Do breathing exercises. For example:  From a standing position, bend forward from the waist with your knees slightly bent. Let your arms dangle close to the floor.  Breathe in slowly and deeply as you return to a standing position. Roll up slowly and lift your head last.  Hold your breath for just a few seconds in the standing position.  Breathe out slowly and bend forward from the waist.  Let  "your feelings out. Talk, laugh, cry, and express anger when you need to. Talking with supportive friends or family, a counselor, or a darline leader about your feelings is a healthy way to relieve stress. Avoid discussing your feelings with people who make you feel worse.  Write. It may help to write about things that are bothering you. This helps you find out how much stress you feel and what is causing it. When you know this, you can find better ways to cope.  What can you do to prevent stress?  You might try some of these things to help prevent stress:  Manage your time. This helps you find time to do the things you want and need to do.  Get enough sleep. Your body recovers from the stresses of the day while you are sleeping.  Get support. Your family, friends, and community can make a difference in how you experience stress.  Limit your news feed. Avoid or limit time on social media or news that may make you feel stressed.  Do something active. Exercise or activity can help reduce stress. Walking is a great way to get started.  Where can you learn more?  Go to https://www.[employer]..net/patiented  Enter N032 in the search box to learn more about \"Learning About Stress.\"  Current as of: October 24, 2023               Content Version: 14.0    5732-5231 [employer].   Care instructions adapted under license by your healthcare professional. If you have questions about a medical condition or this instruction, always ask your healthcare professional. [employer] disclaims any warranty or liability for your use of this information.      "

## 2024-05-21 NOTE — PROGRESS NOTES
Preventive Care Visit  [facility]  Sobia Yeung NP, Nurse Practitioner - Family  May 21, 2024      Assessment & Plan     Encounter for Medicare annual wellness exam  - Comprehensive metabolic panel (BMP + Alb, Alk Phos, ALT, AST, Total. Bili, TP)  - Ferritin  - Vitamin B12  - CBC with platelets and differential  - TSH with free T4 reflex    Mass of skin of back  Given patient reporting sudden onset 2 weeks ago of golf ball sized subcutaneous mass on left upper back, recommend following up with general surgery to discuss removal.  - Adult General Surg Referral; Future    Essential hypertension with goal blood pressure less than 140/90  Chronic, stable, continue current treatment.   - valsartan (DIOVAN) 80 MG tablet; Take 1 tablet (80 mg) by mouth daily  - Comprehensive metabolic panel (BMP + Alb, Alk Phos, ALT, AST, Total. Bili, TP); Future  - Comprehensive metabolic panel (BMP + Alb, Alk Phos, ALT, AST, Total. Bili, TP)    Hyperlipidemia LDL goal <130  Chronic, stable, continue current treatment.   - rosuvastatin (CRESTOR) 10 MG tablet; Take 1 tablet (10 mg) by mouth daily  - Lipid panel reflex to direct LDL Fasting; Future  - Lipid panel reflex to direct LDL Fasting    Gastroesophageal reflux disease with esophagitis without hemorrhage  Chronic, stable, but with some break through symptoms.  Discussed considering using Tums prior to known triggers to see if this helps manage preventing the break through symptoms.  - esomeprazole (NEXIUM) 40 MG DR capsule; TAKE 1 CAPSULE BY MOUTH DAILY AS NEEDED 30 TO 60 MINUTES BEFORE  EATING  - Vitamin B12; Future  - Vitamin B12    Dacosta's esophagus without dysplasia  Last upper endoscopy was in 2022 so would recommend repeating next year for surveillance.  Continue PPI as above.    Stage 2 chronic kidney disease    - valsartan (DIOVAN) 80 MG tablet; Take 1 tablet (80 mg) by mouth daily    Chronic pain of left knee  Consider follow up with  "Orthopedic or Physical Therapy; patient will consider.    Vitamin D deficiency    - Vitamin D Deficiency; Future  - Vitamin D Deficiency    Encounter for screening for malignant neoplasm of prostate    - PSA, screen; Future  - PSA, screen    Seasonal depression (H24)  Known issue that I take into account for their medical decisions, no current exacerbations or new concerns.       BMI  Estimated body mass index is 29.71 kg/m  as calculated from the following:    Height as of this encounter: 1.791 m (5' 10.5\").    Weight as of this encounter: 95.3 kg (210 lb).       Counseling  Appropriate preventive services were discussed with this patient, including applicable screening as appropriate for fall prevention, nutrition, physical activity, Tobacco-use cessation, weight loss and cognition.  Checklist reviewing preventive services available has been given to the patient.  Reviewed patient's diet, addressing concerns and/or questions.         Braulio Edwards is a 78 year old, presenting for the following:  Wellness Visit (Fasting )        5/21/2024     7:48 AM   Additional Questions   Roomed by Lily CORTEZ         Health Care Directive  Patient has a Health Care Directive on file  Discussed advance care planning with patient.    HPI    Lump on back that suddenly appeared 2 weeks ago.  It is not painful.  No drainage or redness.    Allergies are bad.    Joint pain - Joints hurt and ache more in spring.  Knees bilateral, but the L knee today is swollen and sore.  Rakes lawn, volunteers, when on legs a lot then he has to crash and recover for a day due to knee pain flaring.  Likes to golf and thinks his golf swing may aggravate his knees.  Takes Tylenol as needed.    GERD- Gets this more so when climbing a hill when golfing.  Sometimes triggered by certain foods.  Quickly goes away.    Volunteers in a middle school.  walking          5/14/2024   General Health   How would you rate your overall physical health? (!) FAIR   Feel " stress (tense, anxious, or unable to sleep) To some extent   (!) STRESS CONCERN      5/14/2024   Nutrition   Diet: Other   If other, please elaborate: No sugar         5/14/2024   Exercise   Days per week of moderate/strenous exercise 5 days         5/14/2024   Social Factors   Frequency of gathering with friends or relatives More than three times a week   Worry food won't last until get money to buy more No   Food not last or not have enough money for food? No   Do you have housing?  Yes   Are you worried about losing your housing? No   Lack of transportation? No   Unable to get utilities (heat,electricity)? No         5/21/2024   Fall Risk   Fallen 2 or more times in the past year? No   Trouble with walking or balance? Yes           5/14/2024   Activities of Daily Living- Home Safety   Needs help with the following daily activites None of the above   Safety concerns in the home None of the above         5/14/2024   Dental   Dentist two times every year? Yes         5/14/2024   Hearing Screening   Hearing concerns? None of the above         5/14/2024   Driving Risk Screening   Patient/family members have concerns about driving No         5/14/2024   General Alertness/Fatigue Screening   Have you been more tired than usual lately? No         5/14/2024   Urinary Incontinence Screening   Bothered by leaking urine in past 6 months No            Today's PHQ-2 Score:       5/21/2024     7:37 AM   PHQ-2 ( 1999 [employer])   Q1: Little interest or pleasure in doing things 0   Q2: Feeling down, depressed or hopeless 0   PHQ-2 Score 0   Q1: Little interest or pleasure in doing things Not at all   Q2: Feeling down, depressed or hopeless Not at all   PHQ-2 Score 0     LUMP on left shoulder noticed 2 weeks ago,             5/14/2024   Substance Use   Alcohol more than 3/day or more than 7/wk No   Do you have a current opioid prescription? No   How severe/bad is pain from 1 to 10? 5/10   Do you use any other substances  recreationally? No     Social History     Tobacco Use    Smoking status: Former     Current packs/day: 0.00     Average packs/day: 1 pack/day for 10.0 years (10.0 ttl pk-yrs)     Types: Cigarettes     Start date: 1963     Quit date: 1973     Years since quittin.3    Smokeless tobacco: Never   Vaping Use    Vaping status: Never Used   Substance Use Topics    Alcohol use: Not Currently    Drug use: No       ASCVD Risk   The 10-year ASCVD risk score (Rios BLEDSOE, et al., 2019) is: 30.5%    Values used to calculate the score:      Age: 78 years      Sex: Male      Is Non- : No      Diabetic: No      Tobacco smoker: No      Systolic Blood Pressure: 134 mmHg      Is BP treated: Yes      HDL Cholesterol: 62 mg/dL      Total Cholesterol: 203 mg/dL            Reviewed and updated as needed this visit by Provider     Meds   Med Hx  Surg Hx  Fam Hx   Sexual Activity          BP Readings from Last 3 Encounters:   24 134/80   23 130/84   23 132/76    Wt Readings from Last 3 Encounters:   24 95.3 kg (210 lb)   23 98.1 kg (216 lb 3.2 oz)   23 90.3 kg (199 lb)                  Patient Active Problem List   Diagnosis    Osteoarthritis of knee    CARDIOVASCULAR SCREENING; LDL GOAL LESS THAN 130    Seasonal allergic rhinitis    Hyperlipidemia LDL goal <130    Benign non-nodular prostatic hyperplasia with lower urinary tract symptoms    GERD (gastroesophageal reflux disease)    Hyperthyroidism    Essential hypertension with goal blood pressure less than 140/90    Right knee DJD    Seasonal depression (H24)    Nuclear senile cataract of both eyes    Arthritis of knee    Age-related osteoporosis without current pathological fracture    Chronic pain of left knee    Personal history of other endocrine, nutritional and metabolic disease    Diverticulosis of colon    Dacosta's esophagus without dysplasia     Past Surgical History:   Procedure Laterality  Date    APPENDECTOMY      ARTHROPLASTY KNEE Right 2016    Procedure: ARTHROPLASTY KNEE;  Surgeon: Manuel Ponce MD;  Location: [facility]    ARTHROPLASTY KNEE Left 9/15/2022    Procedure: ARTHROPLASTY, KNEE, TOTAL LEFT;  Surgeon: Jesus Rodriguez MD;  Location: [facility]    COLONOSCOPY      COLONOSCOPY WITH CO2 INSUFFLATION N/A 3/2/2018    Procedure: COLONOSCOPY WITH CO2 INSUFFLATION;  Colonoscopy, Constipation, unspecified constipation type Recent change in frequency of bowel movements, [place], BMI 31.52, [facility] [place] [facility];  Surgeon: Edith Craig MD;  Location: [facility] OR    ENT SURGERY      tonsillectomy    LASER HOLMIUM LITHOTRIPSY URETER(S), INSERT STENT, COMBINED  10/11/2012    Procedure: COMBINED CYSTOSCOPY, URETEROSCOPY, LASER HOLMIUM LITHOTRIPSY URETER(S), INSERT STENT;  CYSTOSCOPY, ATTEMPTED RIGHT URETEROSCOPY, INSERT RIGHT URETERAL STENT, [facility] [place];  Surgeon: Petar Ulrich MD;  Location: [facility]       Social History     Tobacco Use    Smoking status: Former     Current packs/day: 0.00     Average packs/day: 1 pack/day for 10.0 years (10.0 ttl pk-yrs)     Types: Cigarettes     Start date: 1963     Quit date: 1973     Years since quittin.3    Smokeless tobacco: Never   Substance Use Topics    Alcohol use: Not Currently     Family History   Problem Relation Age of Onset    Cancer Mother         ovarian    Cancer Father         lung    Heart Disease Father         possibly    Cancer Paternal Grandfather         esophageal cancer    Cancer Brother 68        prostate cancer    Diabetes Maternal Grandmother     Breast Cancer No family hx of     Cancer - colorectal No family hx of     Coronary Artery Disease No family hx of     Hypertension No family hx of     Hyperlipidemia No family hx of     Cerebrovascular Disease No family hx of     Colon Cancer No family hx of     Prostate Cancer No family hx of     Other Cancer No family hx of     Depression No family hx of     Anxiety Disorder  No family hx of     Mental Illness No family hx of     Substance Abuse No family hx of     Anesthesia Reaction No family hx of     Asthma No family hx of     Osteoporosis No family hx of     Genetic Disorder No family hx of     Thyroid Disease No family hx of     Obesity No family hx of     Unknown/Adopted No family hx of     Macular Degeneration No family hx of     Glaucoma No family hx of          Current Outpatient Medications   Medication Sig Dispense Refill    acetaminophen (TYLENOL) 325 MG tablet Take 2 tablets (650 mg) by mouth every 4 hours as needed for other (mild pain) 100 tablet 0    acetaminophen (TYLENOL) 650 MG CR tablet Take 1,300 mg by mouth nightly as needed for mild pain      amoxicillin (AMOXIL) 500 MG capsule Take 4 capsules (2,000 mg) by mouth once as needed (Take 1 hour prior to dental procedure) 4 capsule 3    aspirin 81 MG EC tablet Take 1 tablet (81 mg) by mouth 2 times daily 60 tablet 0    DULoxetine (CYMBALTA) 30 MG capsule Take 20 mg by mouth 2 times daily      esomeprazole (NEXIUM) 40 MG DR capsule TAKE 1 CAPSULE BY MOUTH DAILY AS NEEDED 30 TO 60 MINUTES BEFORE  EATING 90 capsule 3    finasteride (PROSCAR) 5 MG tablet TAKE 1 TABLET BY MOUTH DAILY 90 tablet 2    fluticasone (VERAMYST) 27.5 MCG/SPRAY spray Spray 1 spray into both nostrils daily as needed      rosuvastatin (CRESTOR) 10 MG tablet Take 1 tablet (10 mg) by mouth daily 90 tablet 3    valsartan (DIOVAN) 80 MG tablet Take 1 tablet (80 mg) by mouth daily 30 tablet 3    VITAMIN D, CHOLECALCIFEROL, PO Take 400 Units by mouth daily       Allergies   Allergen Reactions    Codeine Sulfate Nausea and Vomiting     Current providers sharing in care for this patient include:  Patient Care Team:  No Ref-Primary, Physician as PCP - General  Francoise Moralse MD as MD (Ophthalmology)  Manuel Ponce MD as MD (Orthopaedic Surgery)  Jeny Hobbs PA-C as Physician Assistant (Dermatology)  Jesus Rodriguez MD as Assigned  "Musculoskeletal Provider  Mira Harry MD as Assigned PCP    The following health maintenance items are reviewed in Epic and correct as of today:  Health Maintenance   Topic Date Due    RSV VACCINE (Pregnancy & 60+) (1 - 1-dose 60+ series) Never done    BMP  08/02/2023    LIPID  02/02/2024    COVID-19 Vaccine (8 - 2023-24 season) 02/11/2024    DTAP/TDAP/TD IMMUNIZATION (2 - Td or Tdap) 11/25/2024    MEDICARE ANNUAL WELLNESS VISIT  05/21/2025    ANNUAL REVIEW OF HM ORDERS  05/21/2025    FALL RISK ASSESSMENT  05/21/2025    COLORECTAL CANCER SCREENING  06/28/2025    GLUCOSE  02/02/2026    ADVANCE CARE PLANNING  02/02/2028    HEPATITIS C SCREENING  Completed    PHQ-2 (once per calendar year)  Completed    INFLUENZA VACCINE  Completed    Pneumococcal Vaccine: 65+ Years  Completed    ZOSTER IMMUNIZATION  Completed    IPV IMMUNIZATION  Aged Out    HPV IMMUNIZATION  Aged Out    MENINGITIS IMMUNIZATION  Aged Out    RSV MONOCLONAL ANTIBODY  Aged Out    URINE DRUG SCREEN  Discontinued            Objective    Exam  /80 (BP Location: Right arm, Patient Position: Sitting, Cuff Size: Adult Large)   Pulse 78   Temp 97.7  F (36.5  C) (Oral)   Resp 15   Ht 1.791 m (5' 10.5\")   Wt 95.3 kg (210 lb)   SpO2 97%   BMI 29.71 kg/m     Estimated body mass index is 29.71 kg/m  as calculated from the following:    Height as of this encounter: 1.791 m (5' 10.5\").    Weight as of this encounter: 95.3 kg (210 lb).    Physical Exam  GENERAL: alert and no distress  EYES: Eyes grossly normal to inspection, PERRL and conjunctivae and sclerae normal  HENT: ear canals and TM's normal, nose and mouth without ulcers or lesions  NECK: no adenopathy, no asymmetry, masses, or scars  RESP: lungs clear to auscultation - no rales, rhonchi or wheezes  CV: regular rate and rhythm, normal S1 S2, no S3 or S4, no murmur, click or rub, no peripheral edema  ABDOMEN: soft, nontender, no hepatosplenomegaly, no masses and bowel sounds normal  MS: " tenderness and swelling to left knee  SKIN: there is a golf ball sized round, firm, mobile, non tender subcutaneous mass on left upper back  NEURO: Normal strength and tone, mentation intact and speech normal  PSYCH: mentation appears normal, affect normal/bright         5/21/2024   Mini Cog   Clock Draw Score 2 Normal   3 Item Recall 3 objects recalled   Mini Cog Total Score 5              Signed Electronically by: Sobia Yeung, NP

## 2024-05-22 ENCOUNTER — PRE VISIT (OUTPATIENT)
Dept: ORTHOPEDICS | Facility: CLINIC | Age: 79
End: 2024-05-22

## 2024-05-22 ENCOUNTER — LAB (OUTPATIENT)
Dept: LAB | Facility: CLINIC | Age: 79
End: 2024-05-22
Payer: COMMERCIAL

## 2024-05-22 ENCOUNTER — ANCILLARY PROCEDURE (OUTPATIENT)
Dept: GENERAL RADIOLOGY | Facility: CLINIC | Age: 79
End: 2024-05-22
Attending: FAMILY MEDICINE
Payer: COMMERCIAL

## 2024-05-22 ENCOUNTER — OFFICE VISIT (OUTPATIENT)
Dept: ORTHOPEDICS | Facility: CLINIC | Age: 79
End: 2024-05-22
Payer: COMMERCIAL

## 2024-05-22 ENCOUNTER — TRANSFERRED RECORDS (OUTPATIENT)
Dept: HEALTH INFORMATION MANAGEMENT | Facility: CLINIC | Age: 79
End: 2024-05-22

## 2024-05-22 VITALS — HEIGHT: 71 IN | WEIGHT: 210 LBS | BODY MASS INDEX: 29.4 KG/M2

## 2024-05-22 DIAGNOSIS — M25.562 PAIN AND SWELLING OF LEFT KNEE: Primary | ICD-10-CM

## 2024-05-22 DIAGNOSIS — Z96.652 STATUS POST TOTAL LEFT KNEE REPLACEMENT: Primary | ICD-10-CM

## 2024-05-22 DIAGNOSIS — M25.462 PAIN AND SWELLING OF LEFT KNEE: ICD-10-CM

## 2024-05-22 DIAGNOSIS — Z96.652 H/O TOTAL KNEE REPLACEMENT, LEFT: ICD-10-CM

## 2024-05-22 DIAGNOSIS — T84.84XA PAIN DUE TO TOTAL RIGHT KNEE REPLACEMENT, INITIAL ENCOUNTER (H): ICD-10-CM

## 2024-05-22 DIAGNOSIS — Z96.651 PAIN DUE TO TOTAL RIGHT KNEE REPLACEMENT, INITIAL ENCOUNTER (H): ICD-10-CM

## 2024-05-22 DIAGNOSIS — M25.562 LEFT KNEE PAIN: ICD-10-CM

## 2024-05-22 DIAGNOSIS — T56.894A TOXIC EFFECT OF OTHER METALS, UNDETERMINED, INITIAL ENCOUNTER: ICD-10-CM

## 2024-05-22 DIAGNOSIS — M25.562 PAIN AND SWELLING OF LEFT KNEE: ICD-10-CM

## 2024-05-22 DIAGNOSIS — M25.462 PAIN AND SWELLING OF LEFT KNEE: Primary | ICD-10-CM

## 2024-05-22 LAB
APPEARANCE FLD: ABNORMAL
BASOPHILS NFR FLD MANUAL: 1 %
CELL COUNT BODY FLUID SOURCE: ABNORMAL
COLOR FLD: ABNORMAL
CRP SERPL-MCNC: <3 MG/L
ERYTHROCYTE [SEDIMENTATION RATE] IN BLOOD BY WESTERGREN METHOD: 9 MM/HR (ref 0–20)
LYMPHOCYTES NFR FLD MANUAL: 29 %
MONOS+MACROS NFR FLD MANUAL: 17 %
NEUTS BAND NFR FLD MANUAL: 53 %
WBC # FLD AUTO: 4000 /UL

## 2024-05-22 PROCEDURE — 83516 IMMUNOASSAY NONANTIBODY: CPT | Performed by: STUDENT IN AN ORGANIZED HEALTH CARE EDUCATION/TRAINING PROGRAM

## 2024-05-22 PROCEDURE — 86140 C-REACTIVE PROTEIN: CPT | Performed by: STUDENT IN AN ORGANIZED HEALTH CARE EDUCATION/TRAINING PROGRAM

## 2024-05-22 PROCEDURE — 87070 CULTURE OTHR SPECIMN AEROBIC: CPT | Performed by: STUDENT IN AN ORGANIZED HEALTH CARE EDUCATION/TRAINING PROGRAM

## 2024-05-22 PROCEDURE — 99203 OFFICE O/P NEW LOW 30 MIN: CPT | Performed by: FAMILY MEDICINE

## 2024-05-22 PROCEDURE — 87075 CULTR BACTERIA EXCEPT BLOOD: CPT | Performed by: STUDENT IN AN ORGANIZED HEALTH CARE EDUCATION/TRAINING PROGRAM

## 2024-05-22 PROCEDURE — 73562 X-RAY EXAM OF KNEE 3: CPT | Mod: LT | Performed by: RADIOLOGY

## 2024-05-22 PROCEDURE — 85652 RBC SED RATE AUTOMATED: CPT | Performed by: PATHOLOGY

## 2024-05-22 PROCEDURE — 86140 C-REACTIVE PROTEIN: CPT | Performed by: PATHOLOGY

## 2024-05-22 PROCEDURE — 36415 COLL VENOUS BLD VENIPUNCTURE: CPT | Performed by: PATHOLOGY

## 2024-05-22 PROCEDURE — 99214 OFFICE O/P EST MOD 30 MIN: CPT | Mod: 25 | Performed by: STUDENT IN AN ORGANIZED HEALTH CARE EDUCATION/TRAINING PROGRAM

## 2024-05-22 PROCEDURE — 89050 BODY FLUID CELL COUNT: CPT | Performed by: STUDENT IN AN ORGANIZED HEALTH CARE EDUCATION/TRAINING PROGRAM

## 2024-05-22 PROCEDURE — 20610 DRAIN/INJ JOINT/BURSA W/O US: CPT | Performed by: STUDENT IN AN ORGANIZED HEALTH CARE EDUCATION/TRAINING PROGRAM

## 2024-05-22 PROCEDURE — 99000 SPECIMEN HANDLING OFFICE-LAB: CPT | Performed by: PATHOLOGY

## 2024-05-22 SDOH — HEALTH STABILITY: PHYSICAL HEALTH: ON AVERAGE, HOW MANY MINUTES DO YOU ENGAGE IN EXERCISE AT THIS LEVEL?: 70 MIN

## 2024-05-22 SDOH — HEALTH STABILITY: PHYSICAL HEALTH: ON AVERAGE, HOW MANY DAYS PER WEEK DO YOU ENGAGE IN MODERATE TO STRENUOUS EXERCISE (LIKE A BRISK WALK)?: 5 DAYS

## 2024-05-22 ASSESSMENT — KOOS JR
GOING UP OR DOWN STAIRS: MODERATE
KOOS JR SCORING: 47.49
TWISING OR PIVOTING ON KNEE: SEVERE
RISING FROM SITTING: MODERATE
BENDING TO THE FLOOR TO PICK UP OBJECT: MODERATE
STANDING UPRIGHT: MODERATE
STRAIGHTENING KNEE FULLY: MODERATE
HOW SEVERE IS YOUR KNEE STIFFNESS AFTER FIRST WAKING IN MORNING: SEVERE

## 2024-05-22 NOTE — TELEPHONE ENCOUNTER
DIAGNOSIS: LT KNEE   APPOINTMENT DATE: 05/22/2024   NOTES STATUS DETAILS   OFFICE NOTE from referring provider Internal 05/22/2024 - Jules Casanova MD - [facility]   OFFICE NOTE from other specialist Internal    OPERATIVE REPORT Internal 09/15/2022 - LT TKA  01/04/2016 - RT TKA   (IMAGES & REPORTS) Internal

## 2024-05-22 NOTE — LETTER
5/22/2024         RE: Jerry Miguel  [address] [place] [address]        Dear Colleague,    Thank you for referring your patient, Jerry Miguel, to the [facility] [place]. Please see a copy of my visit note below.        [facility]  Orthopaedic Surgery Consultation by Shemar Olmedo M.D.    Jerry Miguel MRN# [id number]   Age: 78 year old YOB: 1945     Requesting physician: No ref. provider found  No Ref-Primary, Physician     Background history:  DX:  Dacosta's esophagitis  Diverticulosis  GERD  Hyperlipidemia  Hypothyroidism  Hypertension on aspirin 81 mg  Osteoporosis    TREATMENTS:  2016, right total knee arthroplasty, Dr. Ponce  9/15/2022, left total knee arthroplasty, Dr. Rodriguez           History of Present Illness:   78 year old male who presents to our clinic for the evaluation of pain after left total knee arthroplasty.  Patient is referred by Dr. Casanova.  Patient states that he was doing very well after surgery.  Over the last month without antecedent trauma he has been experiencing more pain especially after activities throughout the entire knee in combination with swelling.  He has been losing some of his extension over time as well.  He has not recently seen a physical therapist.  He denies any pain upon weightbearing.  No fevers or chills.  No redness or warmth.  Patient states that his discomfort waxes and wanes.  He has also been more active over the last few months.  Patient reports that occasionally he feels like the left knee catches a bit on the lateral side.  No gross instability or giving way.    Of note: Patient's spouse passed away end of 2023.      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 Normal mood and affect.  VITAL SIGNS: Height 1.791 m (5' 10.51\"), weight 95.3 kg (210 lb).  Reviewed nursing intake notes.   Body mass index is 29.7 kg/m .  RESP: non labored breathing   ABD: benign, soft, non-tender, no acute peritoneal findings  SKIN: grossly normal   LYMPHATIC: grossly normal, no adenopathy, no extremity edema  NEURO: grossly normal , no motor deficits  VASCULAR: satisfactory perfusion of all extremities   MUSCULOSKELETAL:   Alignment: Neutral  Gait: Normal  Left hip exhibits full range of motion.  No pain upon rotations.  Lasegue's test is negative.  No tenderness to palpation over greater trochanteric region.    L knee: -5-0 . Straight leg raise +. No redness, warmth or skin changes present. Effusion  minimal . Ligamentously stable in both ML and AP direction. Normal PF tracking without crepitus. Apprehension -.  Some tenderness to palpation over the lateral joint line.     Left LE:   Thigh and leg compartments soft and compressible   +Quad/TA/GSC/FHL/EHL   SILT DP/SP/Gayle/Saph/Tib nerve distributions   Palpable dorsalis pedis pulse            Data:   All laboratory data reviewed    CRP 5/22/2024:<3  Sed rate 5/22/2024: 9    All imaging studies reviewed by me personally.    XR knee left 5/22/2024:  My interpretation: Status post left total knee arthroplasty.  Adequate sizing, orientation and fixation of components.  Slight lucency below medial tibial plateau.  No signs of fractures or dislocations.         Assessment and Plan:   Assessment:  78-year-old male presenting with acute on chronic left knee pain status post left total knee arthroplasty September 2022.  Most likely due to overuse/inflammation.  Currently low in excess suspicion for fracture, dislocation, patellar maltracking, loosening or prosthetic joint infection.     Plan:  I extensively discussed today's findings with the patient.  More likely than not his " pain has to do with overuse/local inflammation.  In regards to the loss of range of motion I would recommend reinitiating physical therapy for guided therapy on improving his extension again.  Currently there is a low index of suspicion for prosthetic joint infection but we could rule this out by means of aspiration.  Patient understands and agrees to the treatment plan as set forth.  After obtaining informed consent the left knee was aspirated.  10 cc of angulent fluid was obtained and sent for aerobic/anaerobic cultures, cell count and differential and Synovasure testing.  No complications occurred.  A referral to physical therapy was provided.  All questions were answered.  We will follow-up with patient once the results of the aspiration are in.      Thank you for your referral.    Shemar Olmedo MD, PhD     Adult Reconstruction  [facility] Department of Orthopaedic Surgery    This note was created using dictation software and may contain errors.  Please contact the creator for any clarifications that are needed.    DATA for DOCUMENTATION:         Past Medical History:     Patient Active Problem List   Diagnosis    Osteoarthritis of knee    CARDIOVASCULAR SCREENING; LDL GOAL LESS THAN 130    Seasonal allergic rhinitis    Hyperlipidemia LDL goal <130    Benign non-nodular prostatic hyperplasia with lower urinary tract symptoms    GERD (gastroesophageal reflux disease)    Hyperthyroidism    Essential hypertension with goal blood pressure less than 140/90    Right knee DJD    Seasonal depression (H24)    Nuclear senile cataract of both eyes    Arthritis of knee    Age-related osteoporosis without current pathological fracture    Chronic pain of left knee    Personal history of other endocrine, nutritional and metabolic disease    Diverticulosis of colon    Dacosta's esophagus without dysplasia     Past Medical History:   Diagnosis Date    Arthritis     knee    Dacosta's esophagus  without dysplasia 2022    BPH (benign prostatic hyperplasia)     Chronic prostatitis     Gastro-oesophageal reflux disease     GERD (gastroesophageal reflux disease) 2012    Hypertension     Kidney stone     Seasonal allergic rhinitis 2012    Shingles     Thyroid disease -about    h/o hyperthyroid,treated, now normal       Also see scanned health assessment forms.       Past Surgical History:     Past Surgical History:   Procedure Laterality Date    APPENDECTOMY      ARTHROPLASTY KNEE Right 2016    Procedure: ARTHROPLASTY KNEE;  Surgeon: Manuel Ponce MD;  Location: [facility] OR    ARTHROPLASTY KNEE Left 9/15/2022    Procedure: ARTHROPLASTY, KNEE, TOTAL LEFT;  Surgeon: Jesus Rodriguez MD;  Location: [facility] OR    COLONOSCOPY      COLONOSCOPY WITH CO2 INSUFFLATION N/A 3/2/2018    Procedure: COLONOSCOPY WITH CO2 INSUFFLATION;  Colonoscopy, Constipation, unspecified constipation type Recent change in frequency of bowel movements, [place], BMI 31.52, [facility];  Surgeon: Edith Craig MD;  Location: [facility] OR    ENT SURGERY      tonsillectomy    LASER HOLMIUM LITHOTRIPSY URETER(S), INSERT STENT, COMBINED  10/11/2012    Procedure: COMBINED CYSTOSCOPY, URETEROSCOPY, LASER HOLMIUM LITHOTRIPSY URETER(S), INSERT STENT;  CYSTOSCOPY, ATTEMPTED RIGHT URETEROSCOPY, INSERT RIGHT URETERAL STENT, Laser [place];  Surgeon: Petar Ulrich MD;  Location: [facility] OR            Social History:     Social History     Socioeconomic History    Marital status:      Spouse name: Not on file    Number of children: Not on file    Years of education: Not on file    Highest education level: Not on file   Occupational History    Not on file   Tobacco Use    Smoking status: Former     Current packs/day: 0.00     Average packs/day: 1 pack/day for 10.0 years (10.0 ttl pk-yrs)     Types: Cigarettes     Start date: 1963     Quit date: 1973     Years since quittin.3    Smokeless tobacco: Never    Vaping Use    Vaping status: Never Used   Substance and Sexual Activity    Alcohol use: Not Currently    Drug use: No    Sexual activity: Yes     Partners: Female   Other Topics Concern    Parent/sibling w/ CABG, MI or angioplasty before 65F 55M? No   Social History Narrative    Not on file     Social Determinants of Health     Financial Resource Strain: Low Risk  (5/14/2024)    Financial Resource Strain     Within the past 12 months, have you or your family members you live with been unable to get utilities (heat, electricity) when it was really needed?: No   Food Insecurity: Low Risk  (5/14/2024)    Food Insecurity     Within the past 12 months, did you worry that your food would run out before you got money to buy more?: No     Within the past 12 months, did the food you bought just not last and you didn t have money to get more?: No   Transportation Needs: Low Risk  (5/14/2024)    Transportation Needs     Within the past 12 months, has lack of transportation kept you from medical appointments, getting your medicines, non-medical meetings or appointments, work, or from getting things that you need?: No   Physical Activity: Sufficiently Active (5/22/2024)    Exercise Vital Sign     Days of Exercise per Week: 5 days     Minutes of Exercise per Session: 70 min   Stress: Stress Concern Present (5/14/2024)    [employer] - Occupational Stress Questionnaire     Feeling of Stress : To some extent   Social Connections: Unknown (5/14/2024)    Social Connection and Isolation Panel [NHANES]     Frequency of Communication with Friends and Family: Not on file     Frequency of Social Gatherings with Friends and Family: More than three times a week     Attends [employer] Services: Not on file     Active Member of Clubs or Organizations: Not on file     Attends Club or Organization Meetings: Not on file     Marital Status: Not on file   Interpersonal Safety: Low Risk  (11/9/2023)    Interpersonal  Safety     Do you feel physically and emotionally safe where you currently live?: Yes     Within the past 12 months, have you been hit, slapped, kicked or otherwise physically hurt by someone?: No     Within the past 12 months, have you been humiliated or emotionally abused in other ways by your partner or ex-partner?: No   Housing Stability: Low Risk  (5/14/2024)    Housing Stability     Do you have housing? : Yes     Are you worried about losing your housing?: No            Family History:       Family History   Problem Relation Age of Onset    Cancer Mother         ovarian    Cancer Father         lung    Heart Disease Father         possibly    Cancer Paternal Grandfather         esophageal cancer    Cancer Brother 68        prostate cancer    Diabetes Maternal Grandmother     Breast Cancer No family hx of     Cancer - colorectal No family hx of     Coronary Artery Disease No family hx of     Hypertension No family hx of     Hyperlipidemia No family hx of     Cerebrovascular Disease No family hx of     Colon Cancer No family hx of     Prostate Cancer No family hx of     Other Cancer No family hx of     Depression No family hx of     Anxiety Disorder No family hx of     Mental Illness No family hx of     Substance Abuse No family hx of     Anesthesia Reaction No family hx of     Asthma No family hx of     Osteoporosis No family hx of     Genetic Disorder No family hx of     Thyroid Disease No family hx of     Obesity No family hx of     Unknown/Adopted No family hx of     Macular Degeneration No family hx of     Glaucoma No family hx of             Medications:     Current Outpatient Medications   Medication Sig Dispense Refill    acetaminophen (TYLENOL) 325 MG tablet Take 2 tablets (650 mg) by mouth every 4 hours as needed for other (mild pain) 100 tablet 0    acetaminophen (TYLENOL) 650 MG CR tablet Take 1,300 mg by mouth nightly as needed for mild pain      amoxicillin (AMOXIL) 500 MG capsule Take 4 capsules  (2,000 mg) by mouth once as needed (Take 1 hour prior to dental procedure) 4 capsule 3    aspirin 81 MG EC tablet Take 1 tablet (81 mg) by mouth 2 times daily 60 tablet 0    DULoxetine (CYMBALTA) 30 MG capsule Take 20 mg by mouth 2 times daily      esomeprazole (NEXIUM) 40 MG DR capsule TAKE 1 CAPSULE BY MOUTH DAILY AS NEEDED 30 TO 60 MINUTES BEFORE  EATING 90 capsule 3    finasteride (PROSCAR) 5 MG tablet TAKE 1 TABLET BY MOUTH DAILY 90 tablet 2    fluticasone (VERAMYST) 27.5 MCG/SPRAY spray Spray 1 spray into both nostrils daily as needed      rosuvastatin (CRESTOR) 10 MG tablet Take 1 tablet (10 mg) by mouth daily 90 tablet 3    valsartan (DIOVAN) 80 MG tablet Take 1 tablet (80 mg) by mouth daily 30 tablet 3    VITAMIN D, CHOLECALCIFEROL, PO Take 400 Units by mouth daily       No current facility-administered medications for this visit.              Review of Systems:   A comprehensive 10 point review of systems (constitutional, ENT, cardiac, peripheral vascular, lymphatic, respiratory, GI, , Musculoskeletal, skin, Neurological) was performed and found to be negative except as described in this note.     See intake form completed by patient      Large Joint Injection/Arthocentesis    Date/Time: 5/22/2024 2:16 PM    Performed by: Shemar Olmedo MD  Authorized by: Shemar Olmedo MD    Needle Size:  21 G  Guidance: landmark guided        Aspirate:  Bloody  Aspirate analysis: sent for lab analysis    Outcome:  Tolerated well, no immediate complications  Procedure discussed: discussed risks, benefits, and alternatives    Consent Given by:  Patient  Timeout: timeout called immediately prior to procedure    Prep: patient was prepped and draped in usual sterile fashion        [facility] [address]  [address] [facility] [id number] [phone]  Dept: [phone]  ______________________________________________________________________________    Patient: Jerry  LESLI Miguel   : 1945   MRN: [id number]   May 22, 2024    INVASIVE PROCEDURE SAFETY CHECKLIST    Date: 2024   Procedure:Left knee aspiration   Patient Name: Jerry Miguel  MRN: [id number]  YOB: 1945    Action: Complete sections as appropriate. Any discrepancy results in a HARD COPY until resolved.     PRE PROCEDURE:  Patient ID verified with 2 identifiers (name and  or MRN): Yes  Procedure and site verified with patient/designee (when able): Yes  Accurate consent documentation in medical record: Yes  H&P (or appropriate assessment) documented in medical record: Yes  H&P must be up to 20 days prior to procedure and updates within 24 hours of procedure as applicable: Yes  Relevant diagnostic and radiology test results appropriately labeled and displayed as applicable: Yes  Procedure site(s) marked with provider initials: Yes    TIMEOUT:  Time-Out performed immediately prior to starting procedure, including verbal and active participation of all team members addressing the following:Yes  * Correct patient identify  * Confirmed that the correct side and site are marked  * An accurate procedure consent form  * Agreement on the procedure to be done  * Correct patient position  * Relevant images and results are properly labeled and appropriately displayed  * The need to administer antibiotics or fluids for irrigation purposes during the procedure as applicable   * Safety precautions based on patient history or medication use    DURING PROCEDURE: Verification of correct person, site, and procedures any time the responsibility for care of the patient is transferred to another member of the care team.           Scribed by Larisa Varela LPN for Dr. Olmedo on May 22, 2024 at 217pm  based on the provider's statements to me.     Larisa Varela LPN

## 2024-05-22 NOTE — LETTER
5/22/2024      RE: Jerry Miguel  [address] [facility] [address]     Dear Colleague,    Thank you for referring your patient, Jerry Miguel, to the [facility] [place]. Please see a copy of my visit note below.    Sports Medicine Clinic Visit    PCP: No Ref-Primary, Physician    Jerry Miguel is a 78 year old male who is seen  as self referral presenting with left knee pain and swelling x one month, no injury.  H/o TKA 2022.    No fever, malaise.  Patient has a noninfected subcutaneous mass on his upper back that is nontender and scheduled to be removed by surgery.  That mass today shows no signs of cellulitis or tenderness.    Injury: No new injury    Location of Pain: left knee  Duration of Pain: 1 month(s)  Rating of Pain: 7/10  Pain is better with: tylenol  Pain is worse with: Rolling over at night  Additional Features: swelling, catching laterally  Treatment so far consists of: Tylenol    Pt golfer.    There were no vitals taken for this visit.    History of left-sided knee replacement Dr. Rodriguez 9/15/2022  History of right-sided knee replacement Dr. Ponce 2016        3 views left knee radiographs 10/28/2022 4:15 PM     History: Status post total left knee replacement     Comparison: 9/15/2022     Findings:     AP, lateral and patellofemoral views of the left knee were obtained.      No acute osseous abnormality. Moderate joint effusion.     Stable postsurgical changes of total knee arthroplasty with interval  resolution of soft tissue gas and removal of surgical drain.     Mild mineralization around the knee.     Patellar enthesopathy. Increased soft tissue swelling particularly  anteriorly. Vascular calcifications.     No patellar tilt or lateral subluxation.                                                                      IMPRESSION: Stable total knee arthroplasty without evidence of  hardware complication.     LAKEISHA QUINONES         PMH:  Past Medical  History:   Diagnosis Date     Arthritis     knee     Dacosta's esophagus without dysplasia 06/28/2022     BPH (benign prostatic hyperplasia)      Chronic prostatitis      Gastro-oesophageal reflux disease      GERD (gastroesophageal reflux disease) 06/08/2012     Hypertension      Kidney stone      Seasonal allergic rhinitis 06/08/2012     Shingles      Thyroid disease 2001-about    h/o hyperthyroid,treated, now normal     Past Surgical History:   Procedure Laterality Date     APPENDECTOMY         ARTHROPLASTY KNEE Right 1/4/2016     Procedure: ARTHROPLASTY KNEE;  Surgeon: Manuel Ponce MD;  Location: [facility]     ARTHROPLASTY KNEE Left 9/15/2022     Procedure: ARTHROPLASTY, KNEE, TOTAL LEFT;  Surgeon: Jesus Rodriguez MD;  Location: [facility]     COLONOSCOPY         COLONOSCOPY WITH CO2 INSUFFLATION N/A 3/2/2018     Procedure: COLONOSCOPY WITH CO2 INSUFFLATION;  Colonoscopy, Constipation, unspecified constipation type Recent change in frequency of bowel movements, [place], BMI 31.52, [facility];  Surgeon: Edith Craig MD;  Location: [facility] OR     ENT SURGERY         tonsillectomy     LASER HOLMIUM LITHOTRIPSY URETER(S), INSERT STENT, COMBINED   10/11/2012     Procedure: COMBINED CYSTOSCOPY, URETEROSCOPY, LASER HOLMIUM LITHOTRIPSY URETER(S), INSERT STENT;  CYSTOSCOPY, ATTEMPTED RIGHT URETEROSCOPY, INSERT RIGHT URETERAL STENT, [facility] [place];  Surgeon: Petar Ulrich MD;  Location: [facility]       Active problem list:  Patient Active Problem List   Diagnosis     Osteoarthritis of knee     CARDIOVASCULAR SCREENING; LDL GOAL LESS THAN 130     Seasonal allergic rhinitis     Hyperlipidemia LDL goal <130     Benign non-nodular prostatic hyperplasia with lower urinary tract symptoms     GERD (gastroesophageal reflux disease)     Hyperthyroidism     Essential hypertension with goal blood pressure less than 140/90     Right knee DJD     Seasonal depression (H24)     Nuclear senile cataract of both eyes      Arthritis of knee     Age-related osteoporosis without current pathological fracture     Chronic pain of left knee     Personal history of other endocrine, nutritional and metabolic disease     Diverticulosis of colon     Dacosta's esophagus without dysplasia       FH:  Family History   Problem Relation Age of Onset     Cancer Mother         ovarian     Cancer Father         lung     Heart Disease Father         possibly     Cancer Paternal Grandfather         esophageal cancer     Cancer Brother 68        prostate cancer     Diabetes Maternal Grandmother      Breast Cancer No family hx of      Cancer - colorectal No family hx of      Coronary Artery Disease No family hx of      Hypertension No family hx of      Hyperlipidemia No family hx of      Cerebrovascular Disease No family hx of      Colon Cancer No family hx of      Prostate Cancer No family hx of      Other Cancer No family hx of      Depression No family hx of      Anxiety Disorder No family hx of      Mental Illness No family hx of      Substance Abuse No family hx of      Anesthesia Reaction No family hx of      Asthma No family hx of      Osteoporosis No family hx of      Genetic Disorder No family hx of      Thyroid Disease No family hx of      Obesity No family hx of      Unknown/Adopted No family hx of      Macular Degeneration No family hx of      Glaucoma No family hx of        SH:  Social History     Socioeconomic History     Marital status:      Spouse name: Not on file     Number of children: Not on file     Years of education: Not on file     Highest education level: Not on file   Occupational History     Not on file   Tobacco Use     Smoking status: Former     Current packs/day: 0.00     Average packs/day: 1 pack/day for 10.0 years (10.0 ttl pk-yrs)     Types: Cigarettes     Start date: 1963     Quit date: 1973     Years since quittin.3     Smokeless tobacco: Never   Vaping Use     Vaping status: Never Used   Substance  and Sexual Activity     Alcohol use: Not Currently     Drug use: No     Sexual activity: Yes     Partners: Female   Other Topics Concern     Parent/sibling w/ CABG, MI or angioplasty before 65F 55M? No   Social History Narrative     Not on file     Social Determinants of Health     Financial Resource Strain: Low Risk  (5/14/2024)    Financial Resource Strain      Within the past 12 months, have you or your family members you live with been unable to get utilities (heat, electricity) when it was really needed?: No   Food Insecurity: Low Risk  (5/14/2024)    Food Insecurity      Within the past 12 months, did you worry that your food would run out before you got money to buy more?: No      Within the past 12 months, did the food you bought just not last and you didn t have money to get more?: No   Transportation Needs: Low Risk  (5/14/2024)    Transportation Needs      Within the past 12 months, has lack of transportation kept you from medical appointments, getting your medicines, non-medical meetings or appointments, work, or from getting things that you need?: No   Physical Activity: Unknown (5/14/2024)    Exercise Vital Sign      Days of Exercise per Week: 5 days      Minutes of Exercise per Session: Not on file   Stress: Stress Concern Present (5/14/2024)    [employer] - Occupational Stress Questionnaire      Feeling of Stress : To some extent   Social Connections: Unknown (5/14/2024)    Social Connection and Isolation Panel [NHANES]      Frequency of Communication with Friends and Family: Not on file      Frequency of Social Gatherings with Friends and Family: More than three times a week      Attends Orthodoxy Services: Not on file      Active Member of Clubs or Organizations: Not on file      Attends Club or Organization Meetings: Not on file      Marital Status: Not on file   Interpersonal Safety: Low Risk  (11/9/2023)    Interpersonal Safety      Do you feel physically and  emotionally safe where you currently live?: Yes      Within the past 12 months, have you been hit, slapped, kicked or otherwise physically hurt by someone?: No      Within the past 12 months, have you been humiliated or emotionally abused in other ways by your partner or ex-partner?: No   Housing Stability: Low Risk  (5/14/2024)    Housing Stability      Do you have housing? : Yes      Are you worried about losing your housing?: No       MEDS:  See EMR, reviewed  ALL:  See EMR, reviewed    REVIEW OF SYSTEMS:  CONSTITUTIONAL:NEGATIVE for fever, chills, change in weight  INTEGUMENTARY/SKIN: NEGATIVE for worrisome rashes, moles or lesions  EYES: NEGATIVE for vision changes or irritation  ENT/MOUTH: NEGATIVE for ear, mouth and throat problems  RESP:NEGATIVE for significant cough or SOB  BREAST: NEGATIVE for masses, tenderness or discharge  CV: NEGATIVE for chest pain, palpitations or peripheral edema  GI: NEGATIVE for nausea, abdominal pain, heartburn, or change in bowel habits  :NEGATIVE for frequency, dysuria, or hematuria  :NEGATIVE for frequency, dysuria, or hematuria  NEURO: NEGATIVE for weakness, dizziness or paresthesias  ENDOCRINE: NEGATIVE for temperature intolerance, skin/hair changes  HEME/ALLERGY/IMMUNE: NEGATIVE for bleeding problems  PSYCHIATRIC: NEGATIVE for changes in mood or affect        Objective: The left knee does reveal a mild to moderate effusion.  Mild swelling in the popliteal space, no tenderness in the calf and Homans' sign is negative.  The knee is not warm to the touch.  The overlying skin is intact.  I can flex and extend the knee fully.  He is nontender over the distal IT band, nontender over the patellar tendon, and nontender over the pes anserine bursa.  Appropriate conversation and affect.    Personally viewed the patient x-rays of the knee that show intact hardware without obvious signs of loosening.    Assessment: Left-sided knee pain and swelling x 1 month status post total knee  replacement 2 years ago.    Plan: Sedimentation rate and CRP pending.  Referral to orthopedic knee surgery placed.                        Again, thank you for allowing me to participate in the care of your patient.      Sincerely,    Jules Casanova MD

## 2024-05-22 NOTE — PROGRESS NOTES
Large Joint Injection/Arthocentesis    Date/Time: 2024 2:16 PM    Performed by: Shemar Olmedo MD  Authorized by: Shemar Olmedo MD    Needle Size:  21 G  Guidance: landmark guided        Aspirate:  Bloody  Aspirate analysis: sent for lab analysis    Outcome:  Tolerated well, no immediate complications  Procedure discussed: discussed risks, benefits, and alternatives    Consent Given by:  Patient  Timeout: timeout called immediately prior to procedure    Prep: patient was prepped and draped in usual sterile fashion        [facility] [address] [address] [phone]  Dept: [phone]  ______________________________________________________________________________    Patient: Jerry Miguel   : 1945   MRN: [id number]   May 22, 2024    INVASIVE PROCEDURE SAFETY CHECKLIST    Date: 2024   Procedure:Left knee aspiration   Patient Name: Jerry Miguel  MRN: [id number]  YOB: 1945    Action: Complete sections as appropriate. Any discrepancy results in a HARD COPY until resolved.     PRE PROCEDURE:  Patient ID verified with 2 identifiers (name and  or MRN): Yes  Procedure and site verified with patient/designee (when able): Yes  Accurate consent documentation in medical record: Yes  H&P (or appropriate assessment) documented in medical record: Yes  H&P must be up to 20 days prior to procedure and updates within 24 hours of procedure as applicable: Yes  Relevant diagnostic and radiology test results appropriately labeled and displayed as applicable: Yes  Procedure site(s) marked with provider initials: Yes    TIMEOUT:  Time-Out performed immediately prior to starting procedure, including verbal and active participation of all team members addressing the following:Yes  * Correct patient identify  * Confirmed that the correct side and site are marked  * An accurate procedure consent form  * Agreement on  the procedure to be done  * Correct patient position  * Relevant images and results are properly labeled and appropriately displayed  * The need to administer antibiotics or fluids for irrigation purposes during the procedure as applicable   * Safety precautions based on patient history or medication use    DURING PROCEDURE: Verification of correct person, site, and procedures any time the responsibility for care of the patient is transferred to another member of the care team.           Scribed by Larisa Varela LPN for Dr. Olmedo on May 22, 2024 at 217pm  based on the provider's statements to me.     Larisa Varela LPN

## 2024-05-22 NOTE — PROGRESS NOTES
"    [facility]  Orthopaedic Surgery Consultation by Shemar Olmedo M.D.    Jerry Miguel MRN# [id number]   Age: 78 year old YOB: 1945     Requesting physician: No ref. provider found  No Ref-Primary, Physician     Background history:  DX:  Dacosta's esophagitis  Diverticulosis  GERD  Hyperlipidemia  Hypothyroidism  Hypertension on aspirin 81 mg  Osteoporosis    TREATMENTS:  2016, right total knee arthroplasty, Dr. Ponce  9/15/2022, left total knee arthroplasty, Dr. Rodriguez           History of Present Illness:   78 year old male who presents to our clinic for the evaluation of pain after left total knee arthroplasty.  Patient is referred by Dr. Casanova.  Patient states that he was doing very well after surgery.  Over the last month without antecedent trauma he has been experiencing more pain especially after activities throughout the entire knee in combination with swelling.  He has been losing some of his extension over time as well.  He has not recently seen a physical therapist.  He denies any pain upon weightbearing.  No fevers or chills.  No redness or warmth.  Patient states that his discomfort waxes and wanes.  He has also been more active over the last few months.  Patient reports that occasionally he feels like the left knee catches a bit on the lateral side.  No gross instability or giving way.    Of note: Patient's spouse passed away end of 2023.      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 Normal mood and affect.  VITAL SIGNS: Height 1.791 m (5' 10.51\"), weight 95.3 kg (210 lb).  Reviewed nursing intake notes.   Body mass index is 29.7 kg/m .  RESP: non labored breathing   ABD: " benign, soft, non-tender, no acute peritoneal findings  SKIN: grossly normal   LYMPHATIC: grossly normal, no adenopathy, no extremity edema  NEURO: grossly normal , no motor deficits  VASCULAR: satisfactory perfusion of all extremities   MUSCULOSKELETAL:   Alignment: Neutral  Gait: Normal  Left hip exhibits full range of motion.  No pain upon rotations.  Lasegue's test is negative.  No tenderness to palpation over greater trochanteric region.    L knee: -5-0 . Straight leg raise +. No redness, warmth or skin changes present. Effusion  minimal . Ligamentously stable in both ML and AP direction. Normal PF tracking without crepitus. Apprehension -.  Some tenderness to palpation over the lateral joint line.     Left LE:   Thigh and leg compartments soft and compressible   +Quad/TA/GSC/FHL/EHL   SILT DP/SP/Gayle/Saph/Tib nerve distributions   Palpable dorsalis pedis pulse            Data:   All laboratory data reviewed    CRP 5/22/2024:<3  Sed rate 5/22/2024: 9    All imaging studies reviewed by me personally.    XR knee left 5/22/2024:  My interpretation: Status post left total knee arthroplasty.  Adequate sizing, orientation and fixation of components.  Slight lucency below medial tibial plateau.  No signs of fractures or dislocations.         Assessment and Plan:   Assessment:  78-year-old male presenting with acute on chronic left knee pain status post left total knee arthroplasty September 2022.  Most likely due to overuse/inflammation.  Currently low in excess suspicion for fracture, dislocation, patellar maltracking, loosening or prosthetic joint infection.     Plan:  I extensively discussed today's findings with the patient.  More likely than not his pain has to do with overuse/local inflammation.  In regards to the loss of range of motion I would recommend reinitiating physical therapy for guided therapy on improving his extension again.  Currently there is a low index of suspicion for prosthetic joint  infection but we could rule this out by means of aspiration.  Patient understands and agrees to the treatment plan as set forth.  After obtaining informed consent the left knee was aspirated.  10 cc of angulent fluid was obtained and sent for aerobic/anaerobic cultures, cell count and differential and Synovasure testing.  No complications occurred.  A referral to physical therapy was provided.  All questions were answered.  We will follow-up with patient once the results of the aspiration are in.      Thank you for your referral.    Shemar Olmedo MD, PhD     Adult Reconstruction  [facility] Department of Orthopaedic Surgery    This note was created using dictation software and may contain errors.  Please contact the creator for any clarifications that are needed.    DATA for DOCUMENTATION:         Past Medical History:     Patient Active Problem List   Diagnosis    Osteoarthritis of knee    CARDIOVASCULAR SCREENING; LDL GOAL LESS THAN 130    Seasonal allergic rhinitis    Hyperlipidemia LDL goal <130    Benign non-nodular prostatic hyperplasia with lower urinary tract symptoms    GERD (gastroesophageal reflux disease)    Hyperthyroidism    Essential hypertension with goal blood pressure less than 140/90    Right knee DJD    Seasonal depression (H24)    Nuclear senile cataract of both eyes    Arthritis of knee    Age-related osteoporosis without current pathological fracture    Chronic pain of left knee    Personal history of other endocrine, nutritional and metabolic disease    Diverticulosis of colon    Dacosta's esophagus without dysplasia     Past Medical History:   Diagnosis Date    Arthritis     knee    Dacosta's esophagus without dysplasia 06/28/2022    BPH (benign prostatic hyperplasia)     Chronic prostatitis     Gastro-oesophageal reflux disease     GERD (gastroesophageal reflux disease) 06/08/2012    Hypertension     Kidney stone     Seasonal allergic rhinitis 06/08/2012     Shingles     Thyroid disease -about    h/o hyperthyroid,treated, now normal       Also see scanned health assessment forms.       Past Surgical History:     Past Surgical History:   Procedure Laterality Date    APPENDECTOMY      ARTHROPLASTY KNEE Right 2016    Procedure: ARTHROPLASTY KNEE;  Surgeon: Manuel Ponce MD;  [facility]    ARTHROPLASTY KNEE Left 9/15/2022    Procedure: ARTHROPLASTY, KNEE, TOTAL LEFT;  Surgeon: Jesus Rodriguez MD;  [facility]    COLONOSCOPY      COLONOSCOPY WITH CO2 INSUFFLATION N/A 3/2/2018    Procedure: COLONOSCOPY WITH CO2 INSUFFLATION;  Colonoscopy, Constipation, unspecified constipation type Recent change in frequency of bowel movements, [place], BMI 31.52, [facility];  Surgeon: Edith Craig MD;  Location: [facility] OR    ENT SURGERY      tonsillectomy    LASER HOLMIUM LITHOTRIPSY URETER(S), INSERT STENT, COMBINED  10/11/2012    Procedure: COMBINED CYSTOSCOPY, URETEROSCOPY, LASER HOLMIUM LITHOTRIPSY URETER(S), INSERT STENT;  CYSTOSCOPY, ATTEMPTED RIGHT URETEROSCOPY, INSERT RIGHT URETERAL STENT, Laser [place];  Surgeon: Petar Ulrich MD;  [facility]            Social History:     Social History     Socioeconomic History    Marital status:      Spouse name: Not on file    Number of children: Not on file    Years of education: Not on file    Highest education level: Not on file   Occupational History    Not on file   Tobacco Use    Smoking status: Former     Current packs/day: 0.00     Average packs/day: 1 pack/day for 10.0 years (10.0 ttl pk-yrs)     Types: Cigarettes     Start date: 1963     Quit date: 1973     Years since quittin.3    Smokeless tobacco: Never   Vaping Use    Vaping status: Never Used   Substance and Sexual Activity    Alcohol use: Not Currently    Drug use: No    Sexual activity: Yes     Partners: Female   Other Topics Concern    Parent/sibling w/ CABG, MI or angioplasty before 65F 55M? No   Social  History Narrative    Not on file     Social Determinants of Health     Financial Resource Strain: Low Risk  (5/14/2024)    Financial Resource Strain     Within the past 12 months, have you or your family members you live with been unable to get utilities (heat, electricity) when it was really needed?: No   Food Insecurity: Low Risk  (5/14/2024)    Food Insecurity     Within the past 12 months, did you worry that your food would run out before you got money to buy more?: No     Within the past 12 months, did the food you bought just not last and you didn t have money to get more?: No   Transportation Needs: Low Risk  (5/14/2024)    Transportation Needs     Within the past 12 months, has lack of transportation kept you from medical appointments, getting your medicines, non-medical meetings or appointments, work, or from getting things that you need?: No   Physical Activity: Sufficiently Active (5/22/2024)    Exercise Vital Sign     Days of Exercise per Week: 5 days     Minutes of Exercise per Session: 70 min   Stress: Stress Concern Present (5/14/2024)    Citizen of [place] of Occupational Health - Occupational Stress Questionnaire     Feeling of Stress : To some extent   Social Connections: Unknown (5/14/2024)    Social Connection and Isolation Panel [NHANES]     Frequency of Communication with Friends and Family: Not on file     Frequency of Social Gatherings with Friends and Family: More than three times a week     Attends [employer] Services: Not on file     Active Member of Clubs or Organizations: Not on file     Attends Club or Organization Meetings: Not on file     Marital Status: Not on file   Interpersonal Safety: Low Risk  (11/9/2023)    Interpersonal Safety     Do you feel physically and emotionally safe where you currently live?: Yes     Within the past 12 months, have you been hit, slapped, kicked or otherwise physically hurt by someone?: No     Within the past 12 months, have you been humiliated or  emotionally abused in other ways by your partner or ex-partner?: No   Housing Stability: Low Risk  (5/14/2024)    Housing Stability     Do you have housing? : Yes     Are you worried about losing your housing?: No            Family History:       Family History   Problem Relation Age of Onset    Cancer Mother         ovarian    Cancer Father         lung    Heart Disease Father         possibly    Cancer Paternal Grandfather         esophageal cancer    Cancer Brother 68        prostate cancer    Diabetes Maternal Grandmother     Breast Cancer No family hx of     Cancer - colorectal No family hx of     Coronary Artery Disease No family hx of     Hypertension No family hx of     Hyperlipidemia No family hx of     Cerebrovascular Disease No family hx of     Colon Cancer No family hx of     Prostate Cancer No family hx of     Other Cancer No family hx of     Depression No family hx of     Anxiety Disorder No family hx of     Mental Illness No family hx of     Substance Abuse No family hx of     Anesthesia Reaction No family hx of     Asthma No family hx of     Osteoporosis No family hx of     Genetic Disorder No family hx of     Thyroid Disease No family hx of     Obesity No family hx of     Unknown/Adopted No family hx of     Macular Degeneration No family hx of     Glaucoma No family hx of             Medications:     Current Outpatient Medications   Medication Sig Dispense Refill    acetaminophen (TYLENOL) 325 MG tablet Take 2 tablets (650 mg) by mouth every 4 hours as needed for other (mild pain) 100 tablet 0    acetaminophen (TYLENOL) 650 MG CR tablet Take 1,300 mg by mouth nightly as needed for mild pain      amoxicillin (AMOXIL) 500 MG capsule Take 4 capsules (2,000 mg) by mouth once as needed (Take 1 hour prior to dental procedure) 4 capsule 3    aspirin 81 MG EC tablet Take 1 tablet (81 mg) by mouth 2 times daily 60 tablet 0    DULoxetine (CYMBALTA) 30 MG capsule Take 20 mg by mouth 2 times daily       esomeprazole (NEXIUM) 40 MG DR capsule TAKE 1 CAPSULE BY MOUTH DAILY AS NEEDED 30 TO 60 MINUTES BEFORE  EATING 90 capsule 3    finasteride (PROSCAR) 5 MG tablet TAKE 1 TABLET BY MOUTH DAILY 90 tablet 2    fluticasone (VERAMYST) 27.5 MCG/SPRAY spray Spray 1 spray into both nostrils daily as needed      rosuvastatin (CRESTOR) 10 MG tablet Take 1 tablet (10 mg) by mouth daily 90 tablet 3    valsartan (DIOVAN) 80 MG tablet Take 1 tablet (80 mg) by mouth daily 30 tablet 3    VITAMIN D, CHOLECALCIFEROL, PO Take 400 Units by mouth daily       No current facility-administered medications for this visit.              Review of Systems:   A comprehensive 10 point review of systems (constitutional, ENT, cardiac, peripheral vascular, lymphatic, respiratory, GI, , Musculoskeletal, skin, Neurological) was performed and found to be negative except as described in this note.     See intake form completed by patient

## 2024-05-22 NOTE — PROGRESS NOTES
Sports Medicine Clinic Visit    PCP: No Ref-Primary, Physician    Jerry Miguel is a 78 year old male who is seen  as self referral presenting with left knee pain and swelling x one month, no injury.  H/o TKA 2022.    No fever, malaise.  Patient has a noninfected subcutaneous mass on his upper back that is nontender and scheduled to be removed by surgery.  That mass today shows no signs of cellulitis or tenderness.    Injury: No new injury    Location of Pain: left knee  Duration of Pain: 1 month(s)  Rating of Pain: 7/10  Pain is better with: tylenol  Pain is worse with: Rolling over at night  Additional Features: swelling, catching laterally  Treatment so far consists of: Tylenol    Pt golfer.    There were no vitals taken for this visit.    History of left-sided knee replacement Dr. Rodriguez 9/15/2022  History of right-sided knee replacement Dr. Ponce 2016        3 views left knee radiographs 10/28/2022 4:15 PM     History: Status post total left knee replacement     Comparison: 9/15/2022     Findings:     AP, lateral and patellofemoral views of the left knee were obtained.      No acute osseous abnormality. Moderate joint effusion.     Stable postsurgical changes of total knee arthroplasty with interval  resolution of soft tissue gas and removal of surgical drain.     Mild mineralization around the knee.     Patellar enthesopathy. Increased soft tissue swelling particularly  anteriorly. Vascular calcifications.     No patellar tilt or lateral subluxation.                                                                      IMPRESSION: Stable total knee arthroplasty without evidence of  hardware complication.     LAKEISHA QUINONES         PMH:  Past Medical History:   Diagnosis Date    Arthritis     knee    Dacosta's esophagus without dysplasia 06/28/2022    BPH (benign prostatic hyperplasia)     Chronic prostatitis     Gastro-oesophageal reflux disease     GERD (gastroesophageal reflux disease) 06/08/2012     Hypertension     Kidney stone     Seasonal allergic rhinitis 06/08/2012    Shingles     Thyroid disease 2001-about    h/o hyperthyroid,treated, now normal     Past Surgical History:   Procedure Laterality Date    APPENDECTOMY        ARTHROPLASTY KNEE Right 1/4/2016     Procedure: ARTHROPLASTY KNEE;  Surgeon: Manuel Ponce MD;  [facility]    ARTHROPLASTY KNEE Left 9/15/2022     Procedure: ARTHROPLASTY, KNEE, TOTAL LEFT;  Surgeon: Jesus Rodriguez MD;  [facility]    COLONOSCOPY        COLONOSCOPY WITH CO2 INSUFFLATION N/A 3/2/2018     Procedure: COLONOSCOPY WITH CO2 INSUFFLATION;  Colonoscopy, Constipation, unspecified constipation type Recent change in frequency of bowel movements, [place], BMI 31.52, [facility];  Surgeon: Edith Craig MD;  Location: [facility]    ENT SURGERY         tonsillectomy    LASER HOLMIUM LITHOTRIPSY URETER(S), INSERT STENT, COMBINED   10/11/2012     Procedure: COMBINED CYSTOSCOPY, URETEROSCOPY, LASER HOLMIUM LITHOTRIPSY URETER(S), INSERT STENT;  CYSTOSCOPY, ATTEMPTED RIGHT URETEROSCOPY, INSERT RIGHT URETERAL STENT, Laser [place];  Surgeon: Petar Ulrich MD;  [facility]       Active problem list:  Patient Active Problem List   Diagnosis    Osteoarthritis of knee    CARDIOVASCULAR SCREENING; LDL GOAL LESS THAN 130    Seasonal allergic rhinitis    Hyperlipidemia LDL goal <130    Benign non-nodular prostatic hyperplasia with lower urinary tract symptoms    GERD (gastroesophageal reflux disease)    Hyperthyroidism    Essential hypertension with goal blood pressure less than 140/90    Right knee DJD    Seasonal depression (H24)    Nuclear senile cataract of both eyes    Arthritis of knee    Age-related osteoporosis without current pathological fracture    Chronic pain of left knee    Personal history of other endocrine, nutritional and metabolic disease    Diverticulosis of colon    Dacosta's esophagus without dysplasia       FH:  Family History   Problem  Relation Age of Onset    Cancer Mother         ovarian    Cancer Father         lung    Heart Disease Father         possibly    Cancer Paternal Grandfather         esophageal cancer    Cancer Brother 68        prostate cancer    Diabetes Maternal Grandmother     Breast Cancer No family hx of     Cancer - colorectal No family hx of     Coronary Artery Disease No family hx of     Hypertension No family hx of     Hyperlipidemia No family hx of     Cerebrovascular Disease No family hx of     Colon Cancer No family hx of     Prostate Cancer No family hx of     Other Cancer No family hx of     Depression No family hx of     Anxiety Disorder No family hx of     Mental Illness No family hx of     Substance Abuse No family hx of     Anesthesia Reaction No family hx of     Asthma No family hx of     Osteoporosis No family hx of     Genetic Disorder No family hx of     Thyroid Disease No family hx of     Obesity No family hx of     Unknown/Adopted No family hx of     Macular Degeneration No family hx of     Glaucoma No family hx of        SH:  Social History     Socioeconomic History    Marital status:      Spouse name: Not on file    Number of children: Not on file    Years of education: Not on file    Highest education level: Not on file   Occupational History    Not on file   Tobacco Use    Smoking status: Former     Current packs/day: 0.00     Average packs/day: 1 pack/day for 10.0 years (10.0 ttl pk-yrs)     Types: Cigarettes     Start date: 1963     Quit date: 1973     Years since quittin.3    Smokeless tobacco: Never   Vaping Use    Vaping status: Never Used   Substance and Sexual Activity    Alcohol use: Not Currently    Drug use: No    Sexual activity: Yes     Partners: Female   Other Topics Concern    Parent/sibling w/ CABG, MI or angioplasty before 65F 55M? No   Social History Narrative    Not on file     Social Determinants of Health     Financial Resource Strain: Low Risk  (2024)     Financial Resource Strain     Within the past 12 months, have you or your family members you live with been unable to get utilities (heat, electricity) when it was really needed?: No   Food Insecurity: Low Risk  (5/14/2024)    Food Insecurity     Within the past 12 months, did you worry that your food would run out before you got money to buy more?: No     Within the past 12 months, did the food you bought just not last and you didn t have money to get more?: No   Transportation Needs: Low Risk  (5/14/2024)    Transportation Needs     Within the past 12 months, has lack of transportation kept you from medical appointments, getting your medicines, non-medical meetings or appointments, work, or from getting things that you need?: No   Physical Activity: Unknown (5/14/2024)    Exercise Vital Sign     Days of Exercise per Week: 5 days     Minutes of Exercise per Session: Not on file   Stress: Stress Concern Present (5/14/2024)    [place] [employer] Occupational [employer] - Occupational Stress Questionnaire     Feeling of Stress : To some extent   Social Connections: Unknown (5/14/2024)    Social Connection and Isolation Panel [NHANES]     Frequency of Communication with Friends and Family: Not on file     Frequency of Social Gatherings with Friends and Family: More than three times a week     Attends Orthodoxy Services: Not on file     Active Member of Clubs or Organizations: Not on file     Attends Club or Organization Meetings: Not on file     Marital Status: Not on file   Interpersonal Safety: Low Risk  (11/9/2023)    Interpersonal Safety     Do you feel physically and emotionally safe where you currently live?: Yes     Within the past 12 months, have you been hit, slapped, kicked or otherwise physically hurt by someone?: No     Within the past 12 months, have you been humiliated or emotionally abused in other ways by your partner or ex-partner?: No   Housing Stability: Low Risk  (5/14/2024)    Housing Stability      Do you have housing? : Yes     Are you worried about losing your housing?: No       MEDS:  See EMR, reviewed  ALL:  See EMR, reviewed    REVIEW OF SYSTEMS:  CONSTITUTIONAL:NEGATIVE for fever, chills, change in weight  INTEGUMENTARY/SKIN: NEGATIVE for worrisome rashes, moles or lesions  EYES: NEGATIVE for vision changes or irritation  ENT/MOUTH: NEGATIVE for ear, mouth and throat problems  RESP:NEGATIVE for significant cough or SOB  BREAST: NEGATIVE for masses, tenderness or discharge  CV: NEGATIVE for chest pain, palpitations or peripheral edema  GI: NEGATIVE for nausea, abdominal pain, heartburn, or change in bowel habits  :NEGATIVE for frequency, dysuria, or hematuria  :NEGATIVE for frequency, dysuria, or hematuria  NEURO: NEGATIVE for weakness, dizziness or paresthesias  ENDOCRINE: NEGATIVE for temperature intolerance, skin/hair changes  HEME/ALLERGY/IMMUNE: NEGATIVE for bleeding problems  PSYCHIATRIC: NEGATIVE for changes in mood or affect        Objective: The left knee does reveal a mild to moderate effusion.  Mild swelling in the popliteal space, no tenderness in the calf and Homans' sign is negative.  The knee is not warm to the touch.  The overlying skin is intact.  I can flex and extend the knee fully.  He is nontender over the distal IT band, nontender over the patellar tendon, and nontender over the pes anserine bursa.  Appropriate conversation and affect.    Personally viewed the patient x-rays of the knee that show intact hardware without obvious signs of loosening.    Assessment: Left-sided knee pain and swelling x 1 month status post total knee replacement 2 years ago.    Plan: Sedimentation rate and CRP pending.  Referral to orthopedic knee surgery placed.

## 2024-05-24 ENCOUNTER — IMMUNIZATION (OUTPATIENT)
Dept: FAMILY MEDICINE | Facility: CLINIC | Age: 79
End: 2024-05-24
Payer: COMMERCIAL

## 2024-05-24 DIAGNOSIS — Z23 HIGH PRIORITY FOR 2019-NCOV VACCINE: Primary | ICD-10-CM

## 2024-05-24 LAB — SCANNED LAB RESULT: NORMAL

## 2024-05-24 PROCEDURE — 90480 ADMN SARSCOV2 VAC 1/ONLY CMP: CPT

## 2024-05-24 PROCEDURE — 91320 SARSCV2 VAC 30MCG TRS-SUC IM: CPT

## 2024-05-27 LAB — BACTERIA SNV CULT: NO GROWTH

## 2024-06-05 LAB — BACTERIA SNV CULT: NORMAL

## 2024-07-08 ENCOUNTER — OFFICE VISIT (OUTPATIENT)
Dept: SURGERY | Facility: CLINIC | Age: 79
End: 2024-07-08
Payer: COMMERCIAL

## 2024-07-08 ENCOUNTER — TELEPHONE (OUTPATIENT)
Dept: SURGERY | Facility: CLINIC | Age: 79
End: 2024-07-08

## 2024-07-08 VITALS — SYSTOLIC BLOOD PRESSURE: 174 MMHG | HEART RATE: 77 BPM | DIASTOLIC BLOOD PRESSURE: 80 MMHG

## 2024-07-08 DIAGNOSIS — R22.2 MASS OF SKIN OF BACK: ICD-10-CM

## 2024-07-08 PROCEDURE — 99213 OFFICE O/P EST LOW 20 MIN: CPT | Mod: 57 | Performed by: SURGERY

## 2024-07-08 NOTE — LETTER
7/8/2024      Jerry Miguel  [address] [place] [address]      Dear Colleague,    Thank you for referring your patient, Jerry Miguel, to the [facility]. Please see a copy of my visit note below.    Patient seen in consultation for lump on back by Sobia Yeung NP  Ref Provider    HPI:  Patient is a 79 year old male  with complaints of noticeable lump on his back. Just noticed one day in the mirror. Not painful  The patient noticed the symptoms about few months ago.   Nothing similar in the past   nothing makes the episode better.  Patient has not family history of similar problems    Review Of Systems    Skin: as above  Ears/Nose/Throat: negative  Respiratory: No shortness of breath, dyspnea on exertion, cough, or hemoptysis  Cardiovascular: negative  Gastrointestinal: negative  Genitourinary: negative  Musculoskeletal: negative  Neurologic: negative  Hematologic/Lymphatic/Immunologic: negative  Endocrine: negative      Past Medical History:   Diagnosis Date     Arthritis     knee     Dacosta's esophagus without dysplasia 06/28/2022     BPH (benign prostatic hyperplasia)      Chronic prostatitis      Gastro-oesophageal reflux disease      GERD (gastroesophageal reflux disease) 06/08/2012     Hypertension      Kidney stone      Seasonal allergic rhinitis 06/08/2012     Shingles      Thyroid disease 2001-about    h/o hyperthyroid,treated, now normal       Past Surgical History:   Procedure Laterality Date     APPENDECTOMY       ARTHROPLASTY KNEE Right 1/4/2016    Procedure: ARTHROPLASTY KNEE;  Surgeon: Manuel Ponce MD;  [facility]     ARTHROPLASTY KNEE Left 9/15/2022    Procedure: ARTHROPLASTY, KNEE, TOTAL LEFT;  Surgeon: Jesus Rodriguez MD;  [facility]     COLONOSCOPY       COLONOSCOPY WITH CO2 INSUFFLATION N/A 3/2/2018    Procedure: COLONOSCOPY WITH CO2 INSUFFLATION;  Colonoscopy, Constipation, unspecified constipation type Recent change in frequency of  bowel movements, [place], BMI 31.52, [facility];  Surgeon: Edith Craig MD;  Location: [facility]     ENT SURGERY      tonsillectomy     LASER HOLMIUM LITHOTRIPSY URETER(S), INSERT STENT, COMBINED  10/11/2012    Procedure: COMBINED CYSTOSCOPY, URETEROSCOPY, LASER HOLMIUM LITHOTRIPSY URETER(S), INSERT STENT;  CYSTOSCOPY, ATTEMPTED RIGHT URETEROSCOPY, INSERT RIGHT URETERAL STENT, [facility] [place];  Surgeon: Petar Ulrich MD;  [facility]       Social History     Socioeconomic History     Marital status:      Spouse name: Not on file     Number of children: Not on file     Years of education: Not on file     Highest education level: Not on file   Occupational History     Not on file   Tobacco Use     Smoking status: Former     Current packs/day: 0.00     Average packs/day: 1 pack/day for 10.0 years (10.0 ttl pk-yrs)     Types: Cigarettes     Start date: 1963     Quit date: 1973     Years since quittin.4     Smokeless tobacco: Never   Vaping Use     Vaping status: Never Used   Substance and Sexual Activity     Alcohol use: Not Currently     Drug use: No     Sexual activity: Yes     Partners: Female   Other Topics Concern     Parent/sibling w/ CABG, MI or angioplasty before 65F 55M? No   Social History Narrative     Not on file     Social Determinants of Health     Financial Resource Strain: Low Risk  (2024)    Financial Resource Strain      Within the past 12 months, have you or your family members you live with been unable to get utilities (heat, electricity) when it was really needed?: No   Food Insecurity: Low Risk  (2024)    Food Insecurity      Within the past 12 months, did you worry that your food would run out before you got money to buy more?: No      Within the past 12 months, did the food you bought just not last and you didn t have money to get more?: No   Transportation Needs: Low Risk  (2024)    Transportation Needs      Within the past 12 months, has  lack of transportation kept you from medical appointments, getting your medicines, non-medical meetings or appointments, work, or from getting things that you need?: No   Physical Activity: Sufficiently Active (5/22/2024)    Exercise Vital Sign      Days of Exercise per Week: 5 days      Minutes of Exercise per Session: 70 min   Stress: Stress Concern Present (5/14/2024)    [employer] [place] [employer] - Occupational Stress Questionnaire      Feeling of Stress : To some extent   Social Connections: Unknown (5/14/2024)    Social Connection and Isolation Panel [NHANES]      Frequency of Communication with Friends and Family: Not on file      Frequency of Social Gatherings with Friends and Family: More than three times a week      Attends Baptism Services: Not on file      Active Member of Clubs or Organizations: Not on file      Attends Club or Organization Meetings: Not on file      Marital Status: Not on file   Interpersonal Safety: Low Risk  (11/9/2023)    Interpersonal Safety      Do you feel physically and emotionally safe where you currently live?: Yes      Within the past 12 months, have you been hit, slapped, kicked or otherwise physically hurt by someone?: No      Within the past 12 months, have you been humiliated or emotionally abused in other ways by your partner or ex-partner?: No   Housing Stability: Low Risk  (5/14/2024)    Housing Stability      Do you have housing? : Yes      Are you worried about losing your housing?: No       Current Outpatient Medications   Medication Sig Dispense Refill     acetaminophen (TYLENOL) 325 MG tablet Take 2 tablets (650 mg) by mouth every 4 hours as needed for other (mild pain) 100 tablet 0     acetaminophen (TYLENOL) 650 MG CR tablet Take 1,300 mg by mouth nightly as needed for mild pain       amoxicillin (AMOXIL) 500 MG capsule Take 4 capsules (2,000 mg) by mouth once as needed (Take 1 hour prior to dental procedure) 4 capsule 3     aspirin 81 MG EC  tablet Take 1 tablet (81 mg) by mouth 2 times daily 60 tablet 0     DULoxetine (CYMBALTA) 30 MG capsule Take 20 mg by mouth 2 times daily       esomeprazole (NEXIUM) 40 MG DR capsule TAKE 1 CAPSULE BY MOUTH DAILY AS NEEDED 30 TO 60 MINUTES BEFORE  EATING 90 capsule 3     finasteride (PROSCAR) 5 MG tablet TAKE 1 TABLET BY MOUTH DAILY 90 tablet 2     fluticasone (VERAMYST) 27.5 MCG/SPRAY spray Spray 1 spray into both nostrils daily as needed       rosuvastatin (CRESTOR) 10 MG tablet Take 1 tablet (10 mg) by mouth daily 90 tablet 3     valsartan (DIOVAN) 80 MG tablet Take 1 tablet (80 mg) by mouth daily 30 tablet 3     VITAMIN D, CHOLECALCIFEROL, PO Take 400 Units by mouth daily         Medications and history reviewed    Physical exam:  Vitals: BP (!) 174/80   Pulse 77   BMI= There is no height or weight on file to calculate BMI.    Constitutional: healthy, alert, and no distress  Head: Normocephalic. No masses, lesions, tenderness or abnormalities  Musculoskeletal: extremities normal- no gross deformities noted, gait normal, and normal muscle tone  Skin: about 5 cm lipoma just over the left scapular spine        Assessment:     ICD-10-CM    1. Mass of skin of back  R22.2 Adult General Surg Referral        Plan: With location I think this best removed in the OR for better equipment/cautery but could still do under local. Discussed postop recovery and restrictions. Will schedule.    Jerel Gutierrez MD      Again, thank you for allowing me to participate in the care of your patient.        Sincerely,        Jerel Gutierrez MD

## 2024-07-08 NOTE — PROGRESS NOTES
Patient seen in consultation for lump on back by Sobia Yeung NP  Ref Provider    HPI:  Patient is a 79 year old male  with complaints of noticeable lump on his back. Just noticed one day in the mirror. Not painful  The patient noticed the symptoms about few months ago.   Nothing similar in the past   nothing makes the episode better.  Patient has not family history of similar problems    Review Of Systems    Skin: as above  Ears/Nose/Throat: negative  Respiratory: No shortness of breath, dyspnea on exertion, cough, or hemoptysis  Cardiovascular: negative  Gastrointestinal: negative  Genitourinary: negative  Musculoskeletal: negative  Neurologic: negative  Hematologic/Lymphatic/Immunologic: negative  Endocrine: negative      Past Medical History:   Diagnosis Date    Arthritis     knee    Dacosta's esophagus without dysplasia 06/28/2022    BPH (benign prostatic hyperplasia)     Chronic prostatitis     Gastro-oesophageal reflux disease     GERD (gastroesophageal reflux disease) 06/08/2012    Hypertension     Kidney stone     Seasonal allergic rhinitis 06/08/2012    Shingles     Thyroid disease 2001-about    h/o hyperthyroid,treated, now normal       Past Surgical History:   Procedure Laterality Date    APPENDECTOMY      ARTHROPLASTY KNEE Right 1/4/2016    Procedure: ARTHROPLASTY KNEE;  Surgeon: Manuel Ponce MD;  [facility]    ARTHROPLASTY KNEE Left 9/15/2022    Procedure: ARTHROPLASTY, KNEE, TOTAL LEFT;  Surgeon: Jesus Rodriguez MD;  [facility]    COLONOSCOPY      COLONOSCOPY WITH CO2 INSUFFLATION N/A 3/2/2018    Procedure: COLONOSCOPY WITH CO2 INSUFFLATION;  Colonoscopy, Constipation, unspecified constipation type Recent change in frequency of bowel movements, [place], BMI 31.52, [facility];  Surgeon: Edith Craig MD;  Location[facility]    ENT SURGERY      tonsillectomy    LASER HOLMIUM LITHOTRIPSY URETER(S), INSERT STENT, COMBINED  10/11/2012    Procedure: COMBINED CYSTOSCOPY,  URETEROSCOPY, LASER HOLMIUM LITHOTRIPSY URETER(S), INSERT STENT;  CYSTOSCOPY, ATTEMPTED RIGHT URETEROSCOPY, INSERT RIGHT URETERAL STENT, [facility] [place];  Surgeon: Petar Ulrich MD;  Location:  OR       Social History     Socioeconomic History    Marital status:      Spouse name: Not on file    Number of children: Not on file    Years of education: Not on file    Highest education level: Not on file   Occupational History    Not on file   Tobacco Use    Smoking status: Former     Current packs/day: 0.00     Average packs/day: 1 pack/day for 10.0 years (10.0 ttl pk-yrs)     Types: Cigarettes     Start date: 1963     Quit date: 1973     Years since quittin.4    Smokeless tobacco: Never   Vaping Use    Vaping status: Never Used   Substance and Sexual Activity    Alcohol use: Not Currently    Drug use: No    Sexual activity: Yes     Partners: Female   Other Topics Concern    Parent/sibling w/ CABG, MI or angioplasty before 65F 55M? No   Social History Narrative    Not on file     Social Determinants of Health     Financial Resource Strain: Low Risk  (2024)    Financial Resource Strain     Within the past 12 months, have you or your family members you live with been unable to get utilities (heat, electricity) when it was really needed?: No   Food Insecurity: Low Risk  (2024)    Food Insecurity     Within the past 12 months, did you worry that your food would run out before you got money to buy more?: No     Within the past 12 months, did the food you bought just not last and you didn t have money to get more?: No   Transportation Needs: Low Risk  (2024)    Transportation Needs     Within the past 12 months, has lack of transportation kept you from medical appointments, getting your medicines, non-medical meetings or appointments, work, or from getting things that you need?: No   Physical Activity: Sufficiently Active (2024)    Exercise Vital Sign     Days of Exercise per  Week: 5 days     Minutes of Exercise per Session: 70 min   Stress: Stress Concern Present (5/14/2024)    [employer] [place] [employer] - Occupational Stress Questionnaire     Feeling of Stress : To some extent   Social Connections: Unknown (5/14/2024)    Social Connection and Isolation Panel [NHANES]     Frequency of Communication with Friends and Family: Not on file     Frequency of Social Gatherings with Friends and Family: More than three times a week     Attends Restorationism Services: Not on file     Active Member of Clubs or Organizations: Not on file     Attends Club or Organization Meetings: Not on file     Marital Status: Not on file   Interpersonal Safety: Low Risk  (11/9/2023)    Interpersonal Safety     Do you feel physically and emotionally safe where you currently live?: Yes     Within the past 12 months, have you been hit, slapped, kicked or otherwise physically hurt by someone?: No     Within the past 12 months, have you been humiliated or emotionally abused in other ways by your partner or ex-partner?: No   Housing Stability: Low Risk  (5/14/2024)    Housing Stability     Do you have housing? : Yes     Are you worried about losing your housing?: No       Current Outpatient Medications   Medication Sig Dispense Refill    acetaminophen (TYLENOL) 325 MG tablet Take 2 tablets (650 mg) by mouth every 4 hours as needed for other (mild pain) 100 tablet 0    acetaminophen (TYLENOL) 650 MG CR tablet Take 1,300 mg by mouth nightly as needed for mild pain      amoxicillin (AMOXIL) 500 MG capsule Take 4 capsules (2,000 mg) by mouth once as needed (Take 1 hour prior to dental procedure) 4 capsule 3    aspirin 81 MG EC tablet Take 1 tablet (81 mg) by mouth 2 times daily 60 tablet 0    DULoxetine (CYMBALTA) 30 MG capsule Take 20 mg by mouth 2 times daily      esomeprazole (NEXIUM) 40 MG DR capsule TAKE 1 CAPSULE BY MOUTH DAILY AS NEEDED 30 TO 60 MINUTES BEFORE  EATING 90 capsule 3    finasteride (PROSCAR) 5  MG tablet TAKE 1 TABLET BY MOUTH DAILY 90 tablet 2    fluticasone (VERAMYST) 27.5 MCG/SPRAY spray Spray 1 spray into both nostrils daily as needed      rosuvastatin (CRESTOR) 10 MG tablet Take 1 tablet (10 mg) by mouth daily 90 tablet 3    valsartan (DIOVAN) 80 MG tablet Take 1 tablet (80 mg) by mouth daily 30 tablet 3    VITAMIN D, CHOLECALCIFEROL, PO Take 400 Units by mouth daily         Medications and history reviewed    Physical exam:  Vitals: BP (!) 174/80   Pulse 77   BMI= There is no height or weight on file to calculate BMI.    Constitutional: healthy, alert, and no distress  Head: Normocephalic. No masses, lesions, tenderness or abnormalities  Musculoskeletal: extremities normal- no gross deformities noted, gait normal, and normal muscle tone  Skin: about 5 cm lipoma just over the left scapular spine        Assessment:     ICD-10-CM    1. Mass of skin of back  R22.2 Adult General Surg Referral        Plan: With location I think this best removed in the OR for better equipment/cautery but could still do under local. Discussed postop recovery and restrictions. Will schedule.    Jerel Gutierrez MD

## 2024-07-09 PROBLEM — R22.2 MASS OF SKIN OF BACK: Status: ACTIVE | Noted: 2024-07-08

## 2024-07-10 NOTE — TELEPHONE ENCOUNTER
Type of surgery: excision of back mass  Location of surgery: [facility]  Date and time of surgery: 7-31  Surgeon: Brenda  Pre-Op Appt Date: 7-18  Post-Op Appt Date: 8-12   Packet sent out: Yes  Pre-cert/Authorization completed:    Date:

## 2024-07-18 ENCOUNTER — OFFICE VISIT (OUTPATIENT)
Dept: FAMILY MEDICINE | Facility: CLINIC | Age: 79
End: 2024-07-18
Payer: COMMERCIAL

## 2024-07-18 VITALS
HEART RATE: 79 BPM | RESPIRATION RATE: 16 BRPM | SYSTOLIC BLOOD PRESSURE: 148 MMHG | OXYGEN SATURATION: 96 % | TEMPERATURE: 97.4 F | BODY MASS INDEX: 30.02 KG/M2 | DIASTOLIC BLOOD PRESSURE: 90 MMHG | HEIGHT: 71 IN | WEIGHT: 214.4 LBS

## 2024-07-18 DIAGNOSIS — Z01.818 PREOP GENERAL PHYSICAL EXAM: Primary | ICD-10-CM

## 2024-07-18 DIAGNOSIS — I10 ESSENTIAL HYPERTENSION WITH GOAL BLOOD PRESSURE LESS THAN 140/90: ICD-10-CM

## 2024-07-18 DIAGNOSIS — E05.90 HYPERTHYROIDISM: ICD-10-CM

## 2024-07-18 DIAGNOSIS — R22.2 MASS OF SKIN OF BACK: ICD-10-CM

## 2024-07-18 DIAGNOSIS — E78.5 HYPERLIPIDEMIA LDL GOAL <130: ICD-10-CM

## 2024-07-18 PROCEDURE — 93000 ELECTROCARDIOGRAM COMPLETE: CPT | Performed by: NURSE PRACTITIONER

## 2024-07-18 PROCEDURE — 99214 OFFICE O/P EST MOD 30 MIN: CPT | Performed by: NURSE PRACTITIONER

## 2024-07-18 RX ORDER — VALSARTAN 160 MG/1
160 TABLET ORAL DAILY
Qty: 90 TABLET | Refills: 3 | Status: SHIPPED | OUTPATIENT
Start: 2024-07-18 | End: 2024-09-18

## 2024-07-18 ASSESSMENT — PAIN SCALES - GENERAL: PAINLEVEL: NO PAIN (0)

## 2024-07-18 NOTE — PROGRESS NOTES
Preoperative Evaluation  [address] [address]  Phone: [phone]  Primary Provider: Sobia Yeung NP  Pre-op Performing Provider: Sobia Yeung NP  Jul 18, 2024 7/18/2024   Surgical Information   What procedure is being done? surgery on growth of skin of upper back   Facility or Hospital where procedure/surgery will be performed: [facility]   Who is doing the procedure / surgery? Jerel Gutierrez   Date of surgery / procedure: July 31   Time of surgery / procedure: all day   Where do you plan to recover after surgery? at home with family        Fax number for surgical facility: Note does not need to be faxed, will be available electronically in Epic.    Assessment & Plan     The proposed surgical procedure is considered LOW risk.    Preop general physical exam    - EKG 12-lead complete w/read - Clinics    Mass of skin of back  Reason for surgery.    Essential hypertension with goal blood pressure less than 140/90  BP today is elevated 148/90 and discussed increasing Valsartan from 80 mg to 160 mg.  Patient encouraged to monitor his BP at home over the next month and if BP remains >139/89 then to call and we can consider changing back to Valsartan-hydrochlorothiazide which he was on previously.  - valsartan (DIOVAN) 160 MG tablet; Take 1 tablet (160 mg) by mouth daily    Hyperthyroidism  Known issue that I take into account for their medical decisions, no current exacerbations or new concerns.  Last TSH was stable on 5/21/24 - TSH 2.45.    Hyperlipidemia LDL goal <130  Known issue that I take into account for their medical decisions, no current exacerbations or new concerns.          - No identified additional risk factors other than previously addressed    Antiplatelet or Anticoagulation Medication Instructions   - aspirin: Discontinue aspirin 7-10 days prior to procedure to reduce bleeding risk. It should be  resumed postoperatively.     Additional Medication Instructions   - ACE/ARB: Continue without modification (e.g., MAC anesthesia, neurosurgery, spine surgery, heart failure, or labile hypertension with risk of hypertension).   - Statins: Continue taking on the day of surgery.    - SSRIs, SNRIs, TCAs, Antipsychotics: Continue without modification.     Recommendation  Approval given to proceed with proposed procedure, without further diagnostic evaluation.    Braulio Edwards is a 79 year old, presenting for the following:  Pre-Op Exam (7/31/2024, at [facility] )          7/18/2024    10:45 AM   Additional Questions   Roomed by Rosita CHOWDHURY related to upcoming procedure: Patient presents for preop evaluation in anticipation for above procedure.     130/80 BP at home a couple days ago    Repeat /90 on Left arm  148/90 on Right arm        7/18/2024   Pre-Op Questionnaire   Have you ever had a heart attack or stroke? No   Have you ever had surgery on your heart or blood vessels, such as a stent placement, a coronary artery bypass, or surgery on an artery in your head, neck, heart, or legs? No   Do you have chest pain with activity? No   Do you have a history of heart failure? No   Do you currently have a cold, bronchitis or symptoms of other infection? No   Do you have a cough, shortness of breath, or wheezing? No   Do you or anyone in your family have previous history of blood clots? No   Do you or does anyone in your family have a serious bleeding problem such as prolonged bleeding following surgeries or cuts? No   Have you ever had problems with anemia or been told to take iron pills? No   Have you had any abnormal blood loss such as black, tarry or bloody stools? No   Have you ever had a blood transfusion? No   Are you willing to have a blood transfusion if it is medically needed before, during, or after your surgery? Yes   Have you or any of your relatives ever had problems with anesthesia? No    Do you have sleep apnea, excessive snoring or daytime drowsiness? No   Do you have any artifical heart valves or other implanted medical devices like a pacemaker, defibrillator, or continuous glucose monitor? No   Do you have artificial joints? (!) YES   Are you allergic to latex? No        Health Care Directive  Patient has a Health Care Directive on file      Preoperative Review of    reviewed - no record of controlled substances prescribed.      Status of Chronic Conditions:  See problem list for active medical problems.  Problems all longstanding and stable, except as noted/documented.  See ROS for pertinent symptoms related to these conditions.    Patient Active Problem List    Diagnosis Date Noted    Mass of skin of back 07/08/2024     Priority: Medium    Diverticulosis of colon 05/21/2024     Priority: Medium    Arthritis of knee 09/15/2022     Priority: Medium    Age-related osteoporosis without current pathological fracture 09/15/2022     Priority: Medium    Chronic pain of left knee 09/15/2022     Priority: Medium    Personal history of other endocrine, nutritional and metabolic disease 09/15/2022     Priority: Medium    Dacosta's esophagus without dysplasia 06/28/2022     Priority: Medium     6/28/22:  Upper endoscopy through [facility] showed Dacosta's esophagus      Nuclear senile cataract of both eyes 12/13/2019     Priority: Medium    Seasonal depression (H24) 09/19/2019     Priority: Medium    Right knee DJD 01/04/2016     Priority: Medium    Essential hypertension with goal blood pressure less than 140/90 07/06/2012     Priority: Medium    Osteoarthritis of knee 06/08/2012     Priority: Medium    CARDIOVASCULAR SCREENING; LDL GOAL LESS THAN 130 06/08/2012     Priority: Medium    Seasonal allergic rhinitis 06/08/2012     Priority: Medium    Hyperlipidemia LDL goal <130 06/08/2012     Priority: Medium    Benign non-nodular prostatic hyperplasia with lower urinary tract symptoms 06/08/2012      Priority: Medium    GERD (gastroesophageal reflux disease) 06/08/2012     Priority: Medium    Hyperthyroidism 06/08/2012     Priority: Medium      Past Medical History:   Diagnosis Date    Arthritis     knee    Dacosta's esophagus without dysplasia 06/28/2022    BPH (benign prostatic hyperplasia)     Chronic prostatitis     Gastro-oesophageal reflux disease     GERD (gastroesophageal reflux disease) 06/08/2012    Hypertension     Kidney stone     Seasonal allergic rhinitis 06/08/2012    Shingles     Thyroid disease 2001-about    h/o hyperthyroid,treated, now normal     Past Surgical History:   Procedure Laterality Date    APPENDECTOMY      ARTHROPLASTY KNEE Right 1/4/2016    Procedure: ARTHROPLASTY KNEE;  Surgeon: Manuel Ponce MD;  Location: [facility]    ARTHROPLASTY KNEE Left 9/15/2022    Procedure: ARTHROPLASTY, KNEE, TOTAL LEFT;  Surgeon: Jesus Rodriguez MD;  Location: [facility] OR    COLONOSCOPY      COLONOSCOPY WITH CO2 INSUFFLATION N/A 3/2/2018    Procedure: COLONOSCOPY WITH CO2 INSUFFLATION;  Colonoscopy, Constipation, unspecified constipation type Recent change in frequency of bowel movements, [place], BMI 31.52, [facility];  Surgeon: Edith Craig MD;  Location: [facility] OR    ENT SURGERY      tonsillectomy    LASER HOLMIUM LITHOTRIPSY URETER(S), INSERT STENT, COMBINED  10/11/2012    Procedure: COMBINED CYSTOSCOPY, URETEROSCOPY, LASER HOLMIUM LITHOTRIPSY URETER(S), INSERT STENT;  CYSTOSCOPY, ATTEMPTED RIGHT URETEROSCOPY, INSERT RIGHT URETERAL STENT, Laser [place];  Surgeon: Petar Ulrich MD;  Location: [facility]     Current Outpatient Medications   Medication Sig Dispense Refill    acetaminophen (TYLENOL) 325 MG tablet Take 2 tablets (650 mg) by mouth every 4 hours as needed for other (mild pain) 100 tablet 0    acetaminophen (TYLENOL) 650 MG CR tablet Take 1,300 mg by mouth nightly as needed for mild pain      amoxicillin (AMOXIL) 500 MG capsule Take 4 capsules (2,000 mg) by mouth once as  needed (Take 1 hour prior to dental procedure) 4 capsule 3    aspirin 81 MG EC tablet Take 1 tablet (81 mg) by mouth 2 times daily 60 tablet 0    DULoxetine (CYMBALTA) 30 MG capsule Take 20 mg by mouth 2 times daily      esomeprazole (NEXIUM) 40 MG DR capsule TAKE 1 CAPSULE BY MOUTH DAILY AS NEEDED 30 TO 60 MINUTES BEFORE  EATING 90 capsule 3    finasteride (PROSCAR) 5 MG tablet TAKE 1 TABLET BY MOUTH DAILY 90 tablet 2    fluticasone (VERAMYST) 27.5 MCG/SPRAY spray Spray 1 spray into both nostrils daily as needed      rosuvastatin (CRESTOR) 10 MG tablet Take 1 tablet (10 mg) by mouth daily 90 tablet 3           valsartan (DIOVAN) 80 MG tablet Take 1 tablet (80 mg) by mouth daily 30 tablet 3    VITAMIN D, CHOLECALCIFEROL, PO Take 400 Units by mouth daily         Allergies   Allergen Reactions    Codeine Sulfate Nausea and Vomiting        Social History     Tobacco Use    Smoking status: Former     Current packs/day: 0.00     Average packs/day: 1 pack/day for 10.0 years (10.0 ttl pk-yrs)     Types: Cigarettes     Start date: 1963     Quit date: 1973     Years since quittin.5    Smokeless tobacco: Never   Substance Use Topics    Alcohol use: Not Currently     Family History   Problem Relation Age of Onset    Cancer Mother         ovarian    Cancer Father         lung    Heart Disease Father         possibly    Cancer Paternal Grandfather         esophageal cancer    Cancer Brother 68        prostate cancer    Diabetes Maternal Grandmother     Breast Cancer No family hx of     Cancer - colorectal No family hx of     Coronary Artery Disease No family hx of     Hypertension No family hx of     Hyperlipidemia No family hx of     Cerebrovascular Disease No family hx of     Colon Cancer No family hx of     Prostate Cancer No family hx of     Other Cancer No family hx of     Depression No family hx of     Anxiety Disorder No family hx of     Mental Illness No family hx of     Substance Abuse No family hx of      "Anesthesia Reaction No family hx of     Asthma No family hx of     Osteoporosis No family hx of     Genetic Disorder No family hx of     Thyroid Disease No family hx of     Obesity No family hx of     Unknown/Adopted No family hx of     Macular Degeneration No family hx of     Glaucoma No family hx of      History   Drug Use No             Objective    BP (!) 148/90   Pulse 79   Temp 97.4  F (36.3  C) (Oral)   Resp 16   Ht 1.791 m (5' 10.5\")   Wt 97.3 kg (214 lb 6.4 oz)   SpO2 96%   BMI 30.33 kg/m     Estimated body mass index is 30.33 kg/m  as calculated from the following:    Height as of this encounter: 1.791 m (5' 10.5\").    Weight as of this encounter: 97.3 kg (214 lb 6.4 oz).  Physical Exam  GENERAL: alert and no distress  EYES: Eyes grossly normal to inspection, PERRL and conjunctivae and sclerae normal  HENT: ear canals and TM's normal, nose and mouth without ulcers or lesions  NECK: no adenopathy, no asymmetry, masses, or scars  RESP: lungs clear to auscultation - no rales, rhonchi or wheezes  CV: regular rate and rhythm, normal S1 S2, no S3 or S4, no murmur, click or rub, no peripheral edema  SKIN: no suspicious lesions or rashes  NEURO: Normal strength and tone, mentation intact and speech normal  PSYCH: mentation appears normal, affect normal/bright    Recent Labs   Lab Test 05/21/24  0920   HGB 14.5         POTASSIUM 4.4   CR 1.03        Diagnostics  5/21/24 - TSH 2.45  EKG: appears normal, NSR, normal axis, normal intervals, no acute ST/T changes c/w ischemia, no LVH by voltage criteria, unchanged from previous tracings    Revised Cardiac Risk Index (RCRI)  The patient has the following serious cardiovascular risks for perioperative complications:   - No serious cardiac risks = 0 points     RCRI Interpretation: 0 points: Class I (very low risk - 0.4% complication rate)         Signed Electronically by: Sobia Yeung NP  Copy of this evaluation report is provided to requesting " physician.

## 2024-07-18 NOTE — PATIENT INSTRUCTIONS
How to Take Your Medication Before Surgery  Preoperative Medication Instructions   Antiplatelet or Anticoagulation Medication Instructions   - aspirin: Discontinue aspirin 7-10 days prior to procedure to reduce bleeding risk. It should be resumed postoperatively.     Additional Medication Instructions   - ACE/ARB: Continue without modification (e.g., MAC anesthesia, neurosurgery, spine surgery, heart failure, or labile hypertension with risk of hypertension).   - Statins: Continue taking on the day of surgery.    - SSRIs, SNRIs, TCAs, Antipsychotics: Continue without modification.        Patient Education   Preparing for Your Surgery  Getting started  A nurse will call you to review your health history and instructions. They will give you an arrival time based on your scheduled surgery time. Please be ready to share:  Your doctor's clinic name and phone number  Your medical, surgical, and anesthesia history  A list of allergies and sensitivities  A list of medicines, including herbal treatments and over-the-counter drugs  Whether the patient has a legal guardian (ask how to send us the papers in advance)  Please tell us if you're pregnant--or if there's any chance you might be pregnant. Some surgeries may injure a fetus (unborn baby), so they require a pregnancy test. Surgeries that are safe for a fetus don't always need a test, and you can choose whether to have one.   If you have a child who's having surgery, please ask for a copy of Preparing for Your Child's Surgery.    Preparing for surgery  Within 10 to 30 days of surgery: Have a pre-op exam (sometimes called an H&P, or History and Physical). This can be done at a clinic or pre-operative center.  If you're having a , you may not need this exam. Talk to your care team.  At your pre-op exam, talk to your care team about all medicines you take. If you need to stop any medicines before surgery, ask when to start taking them again.  We do this for your  safety. Many medicines can make you bleed too much during surgery. Some change how well surgery (anesthesia) drugs work.  Call your insurance company to let them know you're having surgery. (If you don't have insurance, call [phone].)  Call your clinic if there's any change in your health. This includes signs of a cold or flu (sore throat, runny nose, cough, rash, fever). It also includes a scrape or scratch near the surgery site.  If you have questions on the day of surgery, call your hospital or surgery center.  Eating and drinking guidelines  For your safety: Unless your surgeon tells you otherwise, follow the guidelines below.  Eat and drink as usual until 8 hours before you arrive for surgery. After that, no food or milk.  Drink clear liquids until 2 hours before you arrive. These are liquids you can see through, like water, Gatorade, and Propel Water. They also include plain black coffee and tea (no cream or milk), candy, and breath mints. You can spit out gum when you arrive.  If you drink alcohol: Stop drinking it the night before surgery.  If your care team tells you to take medicine on the morning of surgery, it's okay to take it with a sip of water.  Preventing infection  Shower or bathe the night before and morning of your surgery. Follow the instructions your clinic gave you. (If no instructions, use regular soap.)  Don't shave or clip hair near your surgery site. We'll remove the hair if needed.  Don't smoke or vape the morning of surgery. You may chew nicotine gum up to 2 hours before surgery. A nicotine patch is okay.  Note: Some surgeries require you to completely quit smoking and nicotine. Check with your surgeon.  Your care team will make every effort to keep you safe from infection. We will:  Clean our hands often with soap and water (or an alcohol-based hand rub).  Clean the skin at your surgery site with a special soap that kills germs.  Give you a special gown to keep you warm. (Cold  raises the risk of infection.)  Wear special hair covers, masks, gowns and gloves during surgery.  Give antibiotic medicine, if prescribed. Not all surgeries need antibiotics.  What to bring on the day of surgery  Photo ID and insurance card  Copy of your health care directive, if you have one  Glasses and hearing aids (bring cases)  You can't wear contacts during surgery  Inhaler and eye drops, if you use them (tell us about these when you arrive)  CPAP machine or breathing device, if you use them  A few personal items, if spending the night  If you have . . .  A pacemaker, ICD (cardiac defibrillator) or other implant: Bring the ID card.  An implanted stimulator: Bring the remote control.  A legal guardian: Bring a copy of the certified (court-stamped) guardianship papers.  Please remove any jewelry, including body piercings. Leave jewelry and other valuables at home.  If you're going home the day of surgery  You must have a responsible adult drive you home. They should stay with you overnight as well.  If you don't have someone to stay with you, and you aren't safe to go home alone, we may keep you overnight. Insurance often won't pay for this.  After surgery  If it's hard to control your pain or you need more pain medicine, please call your surgeon's office.  Questions?   If you have any questions for your care team, list them here: _________________________________________________________________________________________________________________________________________________________________________ ____________________________________ ____________________________________ ____________________________________  For informational purposes only. Not to replace the advice of your health care provider. Copyright   2003, 2019 [facility]. All rights reserved. Clinically reviewed by Amara Fabian MD. [employer] [id number] - REV 12/22.

## 2024-07-31 ENCOUNTER — HOSPITAL ENCOUNTER (OUTPATIENT)
Facility: AMBULATORY SURGERY CENTER | Age: 79
Discharge: HOME | End: 2024-07-31
Attending: SURGERY | Admitting: SURGERY
Payer: COMMERCIAL

## 2024-07-31 VITALS
DIASTOLIC BLOOD PRESSURE: 89 MMHG | SYSTOLIC BLOOD PRESSURE: 160 MMHG | RESPIRATION RATE: 16 BRPM | OXYGEN SATURATION: 99 % | TEMPERATURE: 97.4 F

## 2024-07-31 PROCEDURE — 88304 TISSUE EXAM BY PATHOLOGIST: CPT | Performed by: PATHOLOGY

## 2024-07-31 PROCEDURE — G8918 PT W/O PREOP ORDER IV AB PRO: HCPCS

## 2024-07-31 PROCEDURE — G8907 PT DOC NO EVENTS ON DISCHARG: HCPCS

## 2024-07-31 PROCEDURE — 21931 EXC BACK LES SC 3 CM/>: CPT | Performed by: SURGERY

## 2024-07-31 PROCEDURE — 21931 EXC BACK LES SC 3 CM/>: CPT

## 2024-07-31 RX ORDER — BUPIVACAINE HYDROCHLORIDE AND EPINEPHRINE 5; 5 MG/ML; UG/ML
INJECTION, SOLUTION PERINEURAL PRN
Status: DISCONTINUED | OUTPATIENT
Start: 2024-07-31 | End: 2024-07-31 | Stop reason: HOSPADM

## 2024-07-31 NOTE — OP NOTE
Date of Service: 7/31/2024     STAFF SURGEON:  Jerel Gutierrez MD     ASSISTANT:  None.     PREOPERATIVE DIAGNOSIS: Left upper back lipoma     POSTOPERATIVE DIAGNOSIS:  Same     NAME OF PROCEDURE(S): Excision soft tissue mass left upper back     INDICATIONS FOR PROCEDURE:  The patient is a 79-year-old male with lipoma in the left upper back over the scapula.  I offered excision for him.  Risks, benefits and alternatives discussed and consent obtained.     EBL: 1 cc    ANESTHESIA: Local    COMPLICATIONS: None     DRAINS:  None.     SPECIMENS:     ID Type Source Tests Collected by Time Destination   1 : Left upper back mass Tissue Back, Upper SURGICAL PATHOLOGY EXAM Jerel Gutierrez MD 7/31/2024  1:27 PM         IMPLANTS: * No implants in log *     OPERATIVE FINDINGS: Well encapsulated lipoma located just over the scapular spine on the left, lipoma measured 4 x 3 cm     PROCEDURE DETAIL:  Following consent, the patient was brought from the preoperative holding area to the operating suite and laid on his right side with appropriate straps and padding.  The left upper back was prepped and draped in usual fashion and timeout performed.   I began with injection of local around the lipoma and then made an approximately 3 cm incision over this with a #15 blade.  I dissected through subcutaneous tissues with cautery until I entered into the surrounding capsule.  I then used a combination of blunt finger sweeps and cautery dissection to free the lipoma and deliver through the wound.  This measured 4 x 3 cm and was passed off for pathology.  Wound appeared hemostatic.  I closed the wound with interrupted 3-0 Vicryl sutures for the deeper subcutaneous layer and running subcuticular 4 Monocryl for skin.  I used a total of 30 cc of half percent Marcaine with epinephrine 1-2 1000 for the local.  Incision dressed with Dermabond for dressing.  Patient tolerated procedure well and will be discharged home.           Jerel Gutierrez  MD

## 2024-08-05 LAB
PATH REPORT.COMMENTS IMP SPEC: NORMAL
PATH REPORT.COMMENTS IMP SPEC: NORMAL
PATH REPORT.FINAL DX SPEC: NORMAL
PATH REPORT.GROSS SPEC: NORMAL
PATH REPORT.MICROSCOPIC SPEC OTHER STN: NORMAL
PATH REPORT.RELEVANT HX SPEC: NORMAL
PHOTO IMAGE: NORMAL

## 2024-08-12 ENCOUNTER — OFFICE VISIT (OUTPATIENT)
Dept: SURGERY | Facility: CLINIC | Age: 79
End: 2024-08-12
Payer: COMMERCIAL

## 2024-08-12 DIAGNOSIS — D17.1 LIPOMA OF BACK: Primary | ICD-10-CM

## 2024-08-12 PROCEDURE — 99024 POSTOP FOLLOW-UP VISIT: CPT | Performed by: SURGERY

## 2024-08-12 NOTE — PROGRESS NOTES
General Surgery Post Op    Pt returns for follow up visit s/p excision of lipoma left upper back on 7/31/2024.    Patient has been doing well, no issues with wound healing or pain.    Physical exam: Vitals: There were no vitals taken for this visit.  BMI= There is no height or weight on file to calculate BMI.    Exam:  Skin: Left upper back incision healing well no signs of infection.    Path:  Final Diagnosis   Soft tissue, left upper back, excision:  - Mature adipose tissue consistent with lipoma       Assessment:     ICD-10-CM    1. Lipoma of back  D17.1         Plan: Recovering and healing well from his excision of lipoma.  Okay for activities as tolerated.  Follow-up as needed.    Jerel Gutierrez MD

## 2024-08-12 NOTE — LETTER
8/12/2024      Jerry Miguel  [address] [place] [address]      Dear Colleague,    Thank you [facility] your [facility], Jerry Miguel[facility]. Please see a copy of my visit note below.    General Surgery Post Op    Pt returns for follow up visit s/p excision of lipoma left upper back on 7/31/2024.    Patient has been doing well, no issues with wound healing or pain.    Physical exam: Vitals: There were no vitals taken for this visit.  BMI= There is no height or weight on file to calculate BMI.    Exam:  Skin: Left upper back incision healing well no signs of infection.    Path:  Final Diagnosis   Soft tissue, left upper back, excision:  - Mature adipose tissue consistent with lipoma       Assessment:     ICD-10-CM    1. Lipoma of back  D17.1         Plan: Recovering and healing well from his excision of lipoma.  Okay for activities as tolerated.  Follow-up as needed.    Jerel Gutierrez MD      Again, thank you for allowing me to participate in the care of your patient.        Sincerely,        Jerel Gutierrez MD

## 2024-09-17 DIAGNOSIS — N18.2 STAGE 2 CHRONIC KIDNEY DISEASE: ICD-10-CM

## 2024-09-17 DIAGNOSIS — I10 ESSENTIAL HYPERTENSION WITH GOAL BLOOD PRESSURE LESS THAN 140/90: ICD-10-CM

## 2024-09-17 NOTE — TELEPHONE ENCOUNTER
[id number] Pharmacy is requesting a 90-day supply.  valsartan (DIOVAN) 80 MG tablet     Quantity Requested: 90.0

## 2024-09-18 RX ORDER — VALSARTAN 80 MG/1
80 TABLET ORAL DAILY
Qty: 30 TABLET | Refills: 3 | OUTPATIENT
Start: 2024-09-18

## 2024-09-18 RX ORDER — VALSARTAN 160 MG/1
160 TABLET ORAL DAILY
Qty: 90 TABLET | Refills: 0 | Status: SHIPPED | OUTPATIENT
Start: 2024-09-18

## 2024-09-30 DIAGNOSIS — N40.1 BENIGN NON-NODULAR PROSTATIC HYPERPLASIA WITH LOWER URINARY TRACT SYMPTOMS: ICD-10-CM

## 2024-10-01 RX ORDER — FINASTERIDE 5 MG/1
1 TABLET, FILM COATED ORAL DAILY
Qty: 90 TABLET | Refills: 3 | Status: SHIPPED | OUTPATIENT
Start: 2024-10-01

## 2024-10-01 NOTE — PROGRESS NOTES
"  Assessment & Plan     Sleep disturbance    We discussed using a mild sleep aid such as hydroxyzine  Discussed the possibility of sleepiness or impairment if taken too close to the morning    - hydrOXYzine (VISTARIL) 25 MG capsule; Take 1-2 capsules (25-50 mg) by mouth nightly as needed for other (sleep)    Colon cancer screening  Patient actually had a colonoscopy at [facility] in June or July  Care everywhere not working for this    No follow-ups on file.    Amaury Lyon MD  [facility]      Braulio Edwards is a 77 year old, presenting for the following health issues:  No chief complaint on file.      History of Present Illness       Reason for visit:  Difficulty with sleep following knee replacement    He eats 2-3 servings of fruits and vegetables daily.He consumes 0 sweetened beverage(s) daily.He exercises with enough effort to increase his heart rate 30 to 60 minutes per day.  He exercises with enough effort to increase his heart rate 7 days per week.   He is taking medications regularly.    Today's PHQ-9         PHQ-9 Total Score: 6    PHQ-9 Q9 Thoughts of better off dead/self-harm past 2 weeks :   Not at all    How difficult have these problems made it for you to do your work, take care of things at home, or get along with other people: Not difficult at all  Today's JAMES-7 Score: 4       Overall things are going well with recovery from his knee replacement but he still having some pain and sleep disturbance at night    Wonders if there is something that could help him sleep that would be safe    Review of Systems   Constitutional, HEENT, cardiovascular, pulmonary, gi and gu systems are negative, except as otherwise noted.      Objective    /76 (BP Location: Right arm, Patient Position: Sitting, Cuff Size: Adult Regular)   Pulse 107   Temp 97.5  F (36.4  C) (Tympanic)   Resp 24   Ht 1.784 m (5' 10.24\")   Wt 86 kg (189 lb 9.6 oz)   SpO2 98%   BMI 27.02 kg/m  " Please see the attached refill request.     Body mass index is 27.02 kg/m .  Physical Exam   GENERAL: healthy, alert and no distress  Normal postoperative swelling of the knee

## 2024-10-14 ENCOUNTER — OFFICE VISIT (OUTPATIENT)
Dept: PALLIATIVE MEDICINE | Facility: CLINIC | Age: 79
End: 2024-10-14
Payer: COMMERCIAL

## 2024-10-14 VITALS — HEART RATE: 73 BPM | SYSTOLIC BLOOD PRESSURE: 192 MMHG | DIASTOLIC BLOOD PRESSURE: 85 MMHG

## 2024-10-14 DIAGNOSIS — M54.16 LUMBAR RADICULOPATHY: Primary | ICD-10-CM

## 2024-10-14 DIAGNOSIS — M79.18 MYOFASCIAL MUSCLE PAIN: ICD-10-CM

## 2024-10-14 PROCEDURE — 99204 OFFICE O/P NEW MOD 45 MIN: CPT | Performed by: PAIN MEDICINE

## 2024-10-14 RX ORDER — GABAPENTIN 100 MG/1
100 CAPSULE ORAL EVERY EVENING
Qty: 30 CAPSULE | Refills: 0 | Status: SHIPPED | OUTPATIENT
Start: 2024-10-14

## 2024-10-14 ASSESSMENT — PAIN SCALES - GENERAL: PAINLEVEL: MODERATE PAIN (5)

## 2024-10-14 NOTE — PROGRESS NOTES
[facility] Med Spine Evaluation    Date of visit: 10/14/2024    Reason for consultation:    Jerry Miguel is a 79 year old male who is seen in consultation today for medical spine evaluation.    PCP is Sobia Yeung.    Please see the [facility] health questionnaire which the patient completed and reviewed with me in detail.    Chief Complaint:    Chief Complaint   Patient presents with    Pain       Pain history:  Jerry Miguel is a 79 year old male with a history of HTN, GERD, and bilateral knee replacements presenting with a chief pain complaint of left low back pain radiating down the LLE.    Onset: 4-5 days ago  Location and Radiation: left low back pain radiating down left posterolateral thigh, posterior calf, and dorsal foot  Provoking: walking, sidelying,   Palliating: sitting in a reclined position, heat and ice  Quality: burning, pins and needles,   Severity: 4/10 - 10/10  Timing: dependent on activity  Numbness: No new numbness. History of decreased sensation from midcalf down  Weakness: hip flexion with knee extended, heaviness in the LLE    Has noticed some instability. Worried about potential falls. Using walker, used shower chair today.    Patient denies red flag symptoms of corresponding bowel/bladder symptoms, fever/chills, saddle anesthesia, profound motor loss, weight loss, or sudden unremitting increase in pain.    Current pain medications include:  Ibuprofen -  Tylenol  - somewhat helpful  Duloxetine for depression  Took a 5 mg hydrocodone-tylenol leftover from knee replacement which provided relief    Previous medication treatments included:  None    Other treatments have included:  Jerry Miguel has not been seen at a pain clinic in the past.    PT: None for this pain  Injections: None  Surgery: None for this pain  Alternative: None    Past Medical History:  Past Medical History:   Diagnosis Date    Arthritis      knee    Dacosta's esophagus without dysplasia 06/28/2022    BPH (benign prostatic hyperplasia)     Chronic prostatitis     Gastro-oesophageal reflux disease     GERD (gastroesophageal reflux disease) 06/08/2012    Hypertension     Kidney stone     Seasonal allergic rhinitis 06/08/2012    Shingles     Thyroid disease 2001-about    h/o hyperthyroid,treated, now normal     Patient Active Problem List    Diagnosis Date Noted    Mass of skin of back 07/08/2024     Priority: Medium    Diverticulosis of colon 05/21/2024     Priority: Medium    Arthritis of knee 09/15/2022     Priority: Medium    Age-related osteoporosis without current pathological fracture 09/15/2022     Priority: Medium    Chronic pain of left knee 09/15/2022     Priority: Medium    Personal history of other endocrine, nutritional and metabolic disease 09/15/2022     Priority: Medium    Dacosta's esophagus without dysplasia 06/28/2022     Priority: Medium     6/28/22:  Upper endoscopy through [facility] showed Dacosta's esophagus      Nuclear senile cataract of both eyes 12/13/2019     Priority: Medium    Seasonal depression (H) 09/19/2019     Priority: Medium    Right knee DJD 01/04/2016     Priority: Medium    Essential hypertension with goal blood pressure less than 140/90 07/06/2012     Priority: Medium    Osteoarthritis of knee 06/08/2012     Priority: Medium    CARDIOVASCULAR SCREENING; LDL GOAL LESS THAN 130 06/08/2012     Priority: Medium    Seasonal allergic rhinitis 06/08/2012     Priority: Medium    Hyperlipidemia LDL goal <130 06/08/2012     Priority: Medium    Benign non-nodular prostatic hyperplasia with lower urinary tract symptoms 06/08/2012     Priority: Medium    GERD (gastroesophageal reflux disease) 06/08/2012     Priority: Medium    Hyperthyroidism 06/08/2012     Priority: Medium       Past Surgical History:  Past Surgical History:   Procedure Laterality Date    APPENDECTOMY      ARTHROPLASTY KNEE Right 1/4/2016    Procedure:  ARTHROPLASTY KNEE;  Surgeon: Manuel Ponce MD;  [facility]: [facility]    ARTHROPLASTY KNEE Left 9/15/2022    Procedure: ARTHROPLASTY, KNEE, TOTAL LEFT;  Surgeon: Jesus Rodriguez MD;  [facility] OR    COLONOSCOPY      COLONOSCOPY WITH CO2 INSUFFLATION N/A 3/2/2018    Procedure: COLONOSCOPY WITH CO2 INSUFFLATION;  Colonoscopy, Constipation, unspecified constipation type Recent change in frequency of bowel movements, [place], BMI 31.52, [facility];  Surgeon: Edith Craig MD;  Location: [facility] OR    ENT SURGERY      tonsillectomy    EXCISE TUMOR, SHOULDER, 3CM OR GREATER Left 7/31/2024    Procedure: EXCISION, NEOPLASM, SOFT TISSUE, LEFT UPPER BACK;  Surgeon: Jerel Gutierrez MD;  Location: [facility] OR    LASER HOLMIUM LITHOTRIPSY URETER(S), INSERT STENT, COMBINED  10/11/2012    Procedure: COMBINED CYSTOSCOPY, URETEROSCOPY, LASER HOLMIUM LITHOTRIPSY URETER(S), INSERT STENT;  CYSTOSCOPY, ATTEMPTED RIGHT URETEROSCOPY, INSERT RIGHT URETERAL STENT, Laser [place];  Surgeon: Petar Ulrich MD;  [facility]     Medications:  Current Outpatient Medications   Medication Sig Dispense Refill    acetaminophen (TYLENOL) 325 MG tablet Take 2 tablets (650 mg) by mouth every 4 hours as needed for other (mild pain) 100 tablet 0    acetaminophen (TYLENOL) 650 MG CR tablet Take 1,300 mg by mouth nightly as needed for mild pain      DULoxetine (CYMBALTA) 30 MG capsule Take 20 mg by mouth 2 times daily      esomeprazole (NEXIUM) 40 MG DR capsule TAKE 1 CAPSULE BY MOUTH DAILY AS NEEDED 30 TO 60 MINUTES BEFORE  EATING 90 capsule 3    finasteride (PROSCAR) 5 MG tablet TAKE 1 TABLET BY MOUTH DAILY 90 tablet 3    fluticasone (VERAMYST) 27.5 MCG/SPRAY spray Spray 1 spray into both nostrils daily as needed      gabapentin (NEURONTIN) 100 MG capsule Take 1 capsule (100 mg) by mouth every evening. 30 capsule 0    rosuvastatin (CRESTOR) 10 MG tablet Take 1 tablet (10 mg) by mouth daily 90 tablet 3    valsartan (DIOVAN) 160 MG  tablet Take 1 tablet (160 mg) by mouth daily. 90 tablet 0    valsartan (DIOVAN) 80 MG tablet Take 1 tablet (80 mg) by mouth daily 30 tablet 3    VITAMIN D, CHOLECALCIFEROL, PO Take 400 Units by mouth daily      amoxicillin (AMOXIL) 500 MG capsule Take 4 capsules (2,000 mg) by mouth once as needed (Take 1 hour prior to dental procedure) (Patient not taking: Reported on 10/14/2024) 4 capsule 3    aspirin 81 MG EC tablet Take 1 tablet (81 mg) by mouth 2 times daily (Patient not taking: Reported on 10/14/2024) 60 tablet 0     Allergies:     Allergies   Allergen Reactions    Codeine Sulfate Nausea and Vomiting         Family history:  Family History   Problem Relation Age of Onset    Cancer Mother         ovarian    Cancer Father         lung    Heart Disease Father         possibly    Cancer Paternal Grandfather         esophageal cancer    Cancer Brother 68        prostate cancer    Diabetes Maternal Grandmother     Breast Cancer No family hx of     Cancer - colorectal No family hx of     Coronary Artery Disease No family hx of     Hypertension No family hx of     Hyperlipidemia No family hx of     Cerebrovascular Disease No family hx of     Colon Cancer No family hx of     Prostate Cancer No family hx of     Other Cancer No family hx of     Depression No family hx of     Anxiety Disorder No family hx of     Mental Illness No family hx of     Substance Abuse No family hx of     Anesthesia Reaction No family hx of     Asthma No family hx of     Osteoporosis No family hx of     Genetic Disorder No family hx of     Thyroid Disease No family hx of     Obesity No family hx of     Unknown/Adopted No family hx of     Macular Degeneration No family hx of     Glaucoma No family hx of        Physical Exam:  Vitals:    10/14/24 1011   BP: (!) 192/85   Pulse: 73     Exam:  Constitutional: Well developed, NAD    Psychiatric: mentation appears normal and affect     Neuromuscular Examination:  Full strength in BLE Slight left hip  flexion weakness 4+/5  Left SLR and seated slump positive      Diagnostic tests:  N/a    Personally reviewed imaging: yes      [place] Prescription Monitoring Program reviewed:     Outside records reviewed:     Assessment:  Jerry was seen today for pain.    Diagnoses and all orders for this visit:    Lumbar radiculopathy  -     MR Lumbar Spine w/o Contrast; Future  -     gabapentin (NEURONTIN) 100 MG capsule; Take 1 capsule (100 mg) by mouth every evening.  -     Physical Therapy  Referral; Future    Myofascial muscle pain          Jerry Miguel is a 79 year old male who presents with LLBP rad to his LE with weakness     Plan:  Diagnosis reviewed, treatment option addressed, and risk/benefits discussed.     - Further procedures recommended: would not recommend at this time   - Medication Management:    - start low dose gabapentin 100mg at night   - Physical Therapy: ordered   - Clinical Health Psychologist to address issues of relaxation, behavioral change, coping style, and other factors important to improvement: no  - Diagnostic Studies: Ordered lumbar MRI   - Urine toxicology screen today: no   - Follow up:    - will call with results of MRI to discuss further         Paras Mccabe MD  Pain Fellow, [facility]    I saw and examined the patient with the Pain Fellow/Resident. I have reviewed and agree with the resident's note and plan of care and made changes and corrections directly to the body of the note.   TIME SPENT:   BY FELLOW/RESIDENT ALONE 30 MIN   BY MYSELF AND FELLOW/RESIDENT TOGETHER 30 MIN   BY MYSELF WITHOUT THE FELLOW/RESIDENT 0 MIN     The total TIME spent on this patient on the day of the appointment was 30 minutes.   Time spent preparing to see the patient (reviewing records and tests)  Time spend face to face with the patient  Time spent ordering tests, medications, procedures and referrals  Time spent Referring and communicating with other healthcare  professionals  Documenting clinical information in Epic

## 2024-10-14 NOTE — PATIENT INSTRUCTIONS
- Further procedures recommended: would not recommend at this time   - Medication Management:    - start low dose gabapentin 100mg at night   - Physical Therapy: ordered   - Diagnostic Studies: Ordered lumbar MRI   - Follow up:    - will call with results of MRI to discuss further     ----------------------------------------------------------------  Clinic Number:  [phone]   Call with any questions about your care and for scheduling assistance.   Calls are returned Monday through Friday between 8 AM and 4:30 PM. We usually get back to you within 2 business days depending on the issue/request.    If we are prescribing your medications:  For opioid medication refills, call the clinic or send a [employer] message 7 days in advance.  Please include:  Name of requested medication  Name of the pharmacy.  For non-opioid medications, call your pharmacy directly to request a refill. Please allow 3-4 days to be processed.   Per [place] State Law:  All controlled substance prescriptions must be filled within 30 days of being written.    For those controlled substances allowing refills, pickup must occur within 30 days of last fill.      We believe regular attendance is key to your success in our program!    Any time you are unable to keep your appointment we ask that you call us at least 24 hours in advance to cancel.This will allow us to offer the appointment time to another patient.   Multiple missed appointments may lead to dismissal from the clinic.

## 2024-10-24 ENCOUNTER — HOSPITAL ENCOUNTER (OUTPATIENT)
Dept: MRI IMAGING | Facility: CLINIC | Age: 79
Discharge: HOME | End: 2024-10-24
Attending: PAIN MEDICINE | Admitting: PAIN MEDICINE
Payer: COMMERCIAL

## 2024-10-24 DIAGNOSIS — M54.16 LUMBAR RADICULOPATHY: ICD-10-CM

## 2024-10-24 PROCEDURE — 72148 MRI LUMBAR SPINE W/O DYE: CPT

## 2024-10-27 ENCOUNTER — TELEPHONE (OUTPATIENT)
Dept: PALLIATIVE MEDICINE | Facility: CLINIC | Age: 79
End: 2024-10-27
Payer: COMMERCIAL

## 2024-10-27 ENCOUNTER — MYC MEDICAL ADVICE (OUTPATIENT)
Dept: PALLIATIVE MEDICINE | Facility: CLINIC | Age: 79
End: 2024-10-27
Payer: COMMERCIAL

## 2024-10-27 DIAGNOSIS — M54.16 LUMBAR RADICULOPATHY: Primary | ICD-10-CM

## 2024-10-28 NOTE — TELEPHONE ENCOUNTER
Patient would like to proceed with injection.     Orders prepped for provider review.     Pamela Sol RN

## 2024-10-31 ENCOUNTER — TELEPHONE (OUTPATIENT)
Dept: PALLIATIVE MEDICINE | Facility: CLINIC | Age: 79
End: 2024-10-31

## 2024-10-31 DIAGNOSIS — M54.16 LUMBAR RADICULOPATHY: Primary | ICD-10-CM

## 2024-10-31 NOTE — TELEPHONE ENCOUNTER
Please call pt to schedule the ordered L5-S1 ILESI .    Kerry RN-BSN  [facility] [place]   [phone]

## 2024-10-31 NOTE — TELEPHONE ENCOUNTER
"Screening Questions for Radiology Injections:    Injection to be done at which interventional clinic site? [facility]    If choosing [facility] for location, please inform patient:  \"[facility] is a Hospital based clinic. Before your visit, you should check with your insurance about how it covers the charges for facility services in a hospital-based clinic.     Procedure ordered by macrus    Procedure ordered? L5-S1 ILESI   Transforaminal Cervical MICHAEL - Send to [facility] - No [facility] Site providers perform this procedure    What insurance would patient like us to bill for this procedure? [facility]  IF SCHEDULING IN [place] PAIN OR SPINE PLEASE SCHEDULE AT LEAST 7-10 BUSINESS DAYS OUT SO A PA CAN BE OBTAINED  Worker's comp or MVA (motor vehicle accident) -Any injection DO NOT SCHEDULE and route to Johan Clinton.    [employer] insurance - For ALL INJECTIONS DO NOT SCHEDULE and route to Betty White.     ALL BCBS, [employer] and [employer]IG[employer] - DO NOT SCHEDULE and route to Betty White  MEDICA- ALL INJECTIONS- route to Betty White    Is patient scheduled at [facility]? no   If YES, route every encounter to [facility] SPINE CENTER CARE NAVIGATION POOL [[phone]]    Is an  needed? No     Patient has a  home? (Review Grid) YES: ok    Any chance of pregnancy? NO   If YES, do NOT schedule and route to RN pool  - Dr. Sung route to PM&R Nurse  [[id number]]      Is patient actively being treated for cancer or immunocompromised? No  If YES, do NOT schedule and route to RN pool/ Dr. Sung's Team    Does the patient have a bleeding or clotting disorder? No   If YES, okay to schedule AND route to RN nurse / Dr. Sung's Team   (For any patients with platelet count <100, RN must forward to provider)    Is patient taking any Blood Thinners OR Antiplatelet medication?  No   If hold needed, do NOT schedule, route to RN pool/ Dr. Sung's Team  Examples:   Blood " Thinners: (Coumadin, Warfarin, Jantoven, Pradaxa, Xarelto, Eliquis, Edoxaban, Enoxaparin, Lovenox, Heparin, Arixtra, Fondaparinux or Fragmin)  Antiplatelet Medications: (Plavix, Brilinta or Effient)     Is patient taking any aspirin products (includes Excedrin and Fiorinal)? No   If yes route to RN pool/ Dr. Sung's Team - Do not schedule    Is patient taking any GLP-1 Antagonist (hold needed for sedation patients only) No   (semaglutide (Ozempic, Wegovy), dulaglutide (Trulicity), exenatide ER (Bydureon), tirzepatide (Mounjaro), Liraglutide (Saxenda, Victoza), semaglutide (Rybelsus), Terzepatide (Zepbound)  If YES, okay to schedule AND route to RN  / Dr. Sung's Team      Any allergies to contrast dye, iodine, shellfish, or numbing and steroid medications? No  If YES, schedule and add allergy information to appointment notes AND route to the RN pool/ Dr. Sung's Team  If MICHAEL and Contrast Dye / Iodine Allergy? DO NOT SCHEDULE, route to RN pool/ Dr. Sung's Team  Allergies: Codeine sulfate     Does patient have an active infection or treated for one within the past week? No  Is patient currently taking any antibiotics or steroid medications?  No   For patients on chronic, preventative, or prophylactic antibiotics, procedures may be scheduled.   For patients on antibiotics for active or recent infection, schedule 4 days after completed.  For patients on steroid medications, schedule 4 days after completed.     Has the patient had a flu shot or any other vaccinations within the past 7 days? No  If yes, explain that for the vaccine to work best they need to:     wait 1 week before and 1 week after getting any Vaccine  wait 1 week before and 2 weeks after getting any Covid Vaccine   If patient has concerns about the timing, send to RN pool/ Dr. Sung's Team    Does patient have an MRI/CT?  YES: MRI Include Date and Check Procedure Scheduling Grid to see if required.  Was the MRI/CT done within the last 3 years?   Yes   If no route to RN Pool/ Dr. Sung's Team  If yes, where was the MRI/CT done? [facility]   Refer to PACS Transmissions list for approved external locations and route to RN Pool High Priority/ Dr. Gonzalez Team  If MRI was not done at approved external location do NOT schedule and route to RN pool/ Dr. Hammonds Team    If patient has an imaging disc, the injection MAY be scheduled but patient must bring disc to appt or appt will be cancelled.    Is patient able to transfer to a procedure table with minimal or no assistance? Yes   If no, do NOT schedule and route to RN Pool/ Dr. Sung's Team    Procedure Specific Instructions:  If celiac plexus block, informed patient NPO for 6 hours and that it is okay to take medications with sips of water, especially blood pressure medications Not Applicable       If this is for a cervical procedure, informed patient that aspirin needs to be held for 6 days.   Not Applicable    Sedation, If Sedation is ordered for any procedure, patient must be NPO for 6 hours prior to procedure Not Applicable    If IV needed:  Do not schedule procedures requiring IV placement in the first appointment of the day or first appointment after lunch. Do NOT schedule at 0745, 0815 or 1245. ok  Instructed patient to arrive 30 minutes early for IV start if required. (Check Procedure Scheduling Grid)  Not Applicable    Reminders:  If you are started on any steroids or antibiotics between now and your appointment, you must contact us because the procedure may need to be cancelled.  Yes    As a reminder, receiving steroids can decrease your body's ability to fight infection.   Would you still like to move forward with scheduling the injection?  Yes    IV Sedation is not provided for procedures. If oral anti-anxiety medication is needed, the patient should request this from their referring provider.    Instruct patient to arrive as directed prior to the scheduled appointment time:  If IV needed 30  minutes before appointment time     For patients 85 or older we recommend having an adult stay w/ them for the remainder of the day.     If the patient is Diabetic, remind them to bring their glucometer.      Does the patient have any questions?  NO  Fannie Clinton  [facility] [place]

## 2024-11-01 ENCOUNTER — RADIOLOGY INJECTION OFFICE VISIT (OUTPATIENT)
Dept: PALLIATIVE MEDICINE | Facility: CLINIC | Age: 79
End: 2024-11-01
Attending: PAIN MEDICINE
Payer: COMMERCIAL

## 2024-11-01 VITALS — OXYGEN SATURATION: 97 % | SYSTOLIC BLOOD PRESSURE: 155 MMHG | DIASTOLIC BLOOD PRESSURE: 92 MMHG | HEART RATE: 66 BPM

## 2024-11-01 DIAGNOSIS — M54.16 LUMBAR RADICULOPATHY: ICD-10-CM

## 2024-11-01 PROCEDURE — 62323 NJX INTERLAMINAR LMBR/SAC: CPT | Performed by: PAIN MEDICINE

## 2024-11-01 RX ORDER — TRIAMCINOLONE ACETONIDE 40 MG/ML
40 INJECTION, SUSPENSION INTRA-ARTICULAR; INTRAMUSCULAR ONCE
Status: COMPLETED | OUTPATIENT
Start: 2024-11-01 | End: 2024-11-01

## 2024-11-01 RX ADMIN — TRIAMCINOLONE ACETONIDE 40 MG: 40 INJECTION, SUSPENSION INTRA-ARTICULAR; INTRAMUSCULAR at 10:05

## 2024-11-01 ASSESSMENT — PAIN SCALES - GENERAL
PAINLEVEL_OUTOF10: MODERATE PAIN (5)
PAINLEVEL_OUTOF10: MODERATE PAIN (4)

## 2024-11-01 NOTE — NURSING NOTE
Pre-procedure Intake  If YES to any questions or NO to having a   Please complete laminated checklist and leave on the computer keyboard for Provider, verbally inform provider if able.    For SCS Trial, RFA's or any sedation procedure:  Have you been fasting? NA  If yes, for how long?     Are you taking any any blood thinners such as Coumadin, Warfarin, Jantoven, Pradaxa Xarelto, Eliquis, Edoxaban, Enoxaparin, Lovenox, Heparin, Arixtra, Fondaparinux, or Fragmin? OR Antiplatelet medication such as Plavix, Brilinta, or Effient?   No   If yes, when did you take your last dose?     Do you take aspirin?  No  If cervical procedure, have you held aspirin for 6 days?   No     Is the Pt taking any GLP-1 Antagonist (hold needed for sedation patients only)  (semaglutide (Ozempic, Wegovy), dulaglutide (Trulicity), exenatide ER (Bydureon), tirzepatide (Mounjaro), Liraglutide (Saxenda, Victoza), semaglutide (Rybelsus)     No   If yes, when did you take your last dose?     Do you have any allergies to contrast dye, iodine, steroid and/or numbing medications?  NO    Are you currently taking antibiotics or have an active infection?  NO    Have you had a fever/elevated temperature within the past week? NO    Are you currently taking oral steroids? NO    Do you have a ? Yes    Are you pregnant or breastfeeding?  N/A    Have you received any vaccinations in the last week? NO    Notify provider and RNs if systolic BP >170, diastolic BP >100, P >100 or O2 sats < 90%

## 2024-11-01 NOTE — PATIENT INSTRUCTIONS
[facility]   Procedure Discharge Instructions    Today you saw:    Dr. Simon Main,         You had a(n):  Lumbar epidural steroid injection Interlaminar     Medications used for interlaminar MICHAEL: Lidocaine, Omnipaque, Kenalog, Bupivicaine, normal saline          Be cautious with walking. Numbness and/or weakness in the lower extremities may occur for up to 6-8 hours after the procedure due to effect of the local anesthetic  Do not drive for 6 hours. The effect of the local anesthetic could slow your reflexes.   You may resume your regular activities after 24 hours  Avoid strenuous activity for the first 24 hours  You may shower, however avoid swimming, tub baths or hot tubs for 24 hours following your procedure  You may have a mild to moderate increase in pain for several days following the injection.  It may take up to 14 days for the steroid medication to start working although you may feel the effect as early as a few days after the procedure.     You may use ice packs for 10-15 minutes, 3 to 4 times a day at the injection site for comfort  Do not use heat to painful areas for 6 to 8 hours. This will give the local anesthetic time to wear off and prevent you from accidentally burning your skin.   Unless you have been directed to avoid the use of anti-inflammatory medications (NSAIDS), you may use medications such as ibuprofen, Aleve or Tylenol for pain control if needed.   If you have diabetes, check your blood sugar more frequently than usual as your blood sugar may be higher than normal for 10-14 days following a steroid injection. Contact your doctor who manages your diabetes if your blood sugar is higher than usual  Possible side effects of steroids that you may experience include flushing, elevated blood pressure, increased appetite, mild headaches and restlessness.  All of these symptoms will get better with time.  If you experience any of the following, call the Pain  Clinic during work hours (Mon-Friday 8-4:30 pm) at [phone] or the Provider Line after hours at [phone]:  -Fever over 100 degree F  -Swelling, bleeding, redness, drainage, warmth at the injection site  -Progressive weakness or numbness in your legs  -Loss of bowel or bladder function  -Unusual headache not relieved by Tylenol or other pain reliever  -Unusual new onset of pain that is not improving

## 2024-11-01 NOTE — NURSING NOTE
Discharge Information    IV Discontiued Time:  NA    Amount of Fluid Infused:  NA    Discharge Criteria = When patient returns to baseline or as per MD order    Consciousness:  Pt is fully awake    Circulation:  BP +/- 20% of pre-procedure level    Respiration:  Patient is able to breathe deeply    O2 Sat:  Patient is able to maintain O2 Sat >92% on room air    Activity:  Moves 4 extremities on command    Ambulation:  Patient is able to stand and walk or stand and pivot into wheelchair    Dressing:  Clean/dry or No Dressing    Notes:   Discharge instructions and AVS given to patient    Patient meets criteria for discharge?  YES    Admitted to PCU?  No    Responsible adult present to accompany patient home?  Yes    Signature/Title:    Pamela Sol RN  RN Care Coordinator  [facility] [facility] [place]

## 2024-11-04 NOTE — PROGRESS NOTES
Pre procedure Diagnosis: Lumbar radiculopathy  Post procedure Diagnosis: Same  Procedure performed: L5-S1 interlaminar epidural steroid injection   Anesthesia: none  Complications: none  Operators: Simon Main MD     Indications:   Jerry Miguel is a 79 year old male.  The patient has a history of lbp rad to his le.  Examination shows + slump.  he has tried conservative treatment including meds/pt.    MRI rev  Options/alternatives, benefits and risks were discussed with the patient including but not limited to bleeding, infection, no pain relief, tissue trauma, exposure to radiation, reaction to medications, spinal cord injury, dural puncture, weakness, numbness and headache.  Questions were answered to his satisfaction and he wishes to proceed. Voluntary informed consent was obtained and signed.     Vitals were reviewed: Yes  Allergies were reviewed:  Yes   Medications were reviewed:  Yes   Pre-procedure pain score: 6/10    Procedure:  The patient's medical history, medications, and allergies were reviewed and reconciled.  After obtaining signed informed consent, the patient was brought into the procedure suite and was placed in a prone position on the procedure table.   A Pause for the Cause was performed.  Patient was prepped and draped in the usual sterile fashion.     The L5-S1 interspace was identified with AP fluoroscopy.  A total of 3ml of 1% lidocaine was used to anesthetize the skin and subcutaneous tissues for a  midline approach.    A 20gauge 3.5inch Touhy needle was advanced utilizing intermittent AP and Lateral fluoroscopy and air for loss of resistance.  The epidural space was encountered on the first pass without difficulties.  Aspiration for blood and CSF was negative.  Needle position was verified by injecting 1 ml of Omnipaque utilizing real-time fluoroscopy that showed good needle placement and epidural spread without signs of intravascular or intrathecal uptake.  Omnipaque wasted:  9  ml.    Then, after repeated negative aspiration for blood or CSF, a combination of Kenalog 40 mg, Bupivicaine 0.25% 2 ml, diluted with 3 ml of normal saline to a total injectate volume of 6 ml was injected into the epidural space in a slow and incremental fashion and the needle was restyletted and withdrawn.  All injected medications were preservative free.    The injection site was cleaned and a sterile dressing was applied.    The patient tolerated the procedure well without complications and was taken to the recovery room for continued observation.    Images were saved to PACS.    Post-procedure pain score: 5/10  Follow-up includes:   -f/u with referring provider     Simon Main MD  [facility] [place]

## 2024-11-06 ENCOUNTER — DOCUMENTATION ONLY (OUTPATIENT)
Dept: OTHER | Facility: CLINIC | Age: 79
End: 2024-11-06

## 2024-11-06 ENCOUNTER — THERAPY VISIT (OUTPATIENT)
Dept: PHYSICAL THERAPY | Facility: CLINIC | Age: 79
End: 2024-11-06
Payer: COMMERCIAL

## 2024-11-06 DIAGNOSIS — M54.16 LUMBAR RADICULOPATHY: ICD-10-CM

## 2024-11-06 PROCEDURE — 97161 PT EVAL LOW COMPLEX 20 MIN: CPT | Mod: GP | Performed by: PHYSICAL THERAPIST

## 2024-11-06 PROCEDURE — 97110 THERAPEUTIC EXERCISES: CPT | Mod: GP | Performed by: PHYSICAL THERAPIST

## 2024-11-06 PROCEDURE — 97112 NEUROMUSCULAR REEDUCATION: CPT | Mod: GP | Performed by: PHYSICAL THERAPIST

## 2024-11-06 NOTE — PROGRESS NOTES
PHYSICAL THERAPY EVALUATION  Type of Visit: Evaluation     Fall Risk Screen:  Fall screen completed by: PT  Have you fallen 2 or more times in the past year?: No  Have you fallen and had an injury in the past year?: No  Is patient a fall risk?: No    Subjective      Condition type:  Chronic (continuous duration <3 months)  Cause of current episode:  Unspecified     Nature of treatment:  Rehabilitative  Functional ability:  Moderate functional limitations  Documented POC (choose all that apply):  Measurable short and long term/discharge treatment goals related to physical and functional deficits.;Frequency of treatment visits and treatment activities to address deficit areas.;Patient agrees to program participation including home program  Briefly describe symptoms:  LBP with L radiculopathy, some foot drop sxs (weak ankle DF)  How did the symptoms start:  insidious  Average pain/intensity last 24 hours:  4/10  Average pain/intensity past week:  4/10  Frequency of Symptoms:  Constantly (% of the time)  Symptom impact on ADLs:  Quite a bit  Condition change since eval:  N/A (first visit)  General health reported by patient:  Good      Presenting condition or subjective complaint:  LBP with L radiculopathy  Date of onset: 10/14/24    Relevant medical history:   Jerry Miguel is a 79 year old male with a history of HTN, GERD, and bilateral knee replacements presenting with a chief pain complaint of left low back pain radiating down the LLE.     Onset: 4-5 days ago (pt reports LBP over the years but recent flare up)  Location and Radiation: left low back pain radiating down left posterolateral thigh, posterior calf, and dorsal foot  Provoking: walking, sidelying,   Palliating: sitting in a reclined position, heat and ice  Quality: burning, pins and needles,   Severity: 4/10 - 10/10  Timing: dependent on activity  Numbness: No new numbness. History of decreased sensation from midcalf down  Weakness: hip flexion  "with knee extended, heaviness in the LLE     Has noticed some instability. Worried about potential falls. Using walker, used shower chair today.  Dates & types of surgery: (Patient-Rptd) Knee rplacement    Prior diagnostic imaging/testing results: (Patient-Rptd) MRI; CT scan; X-ray     Prior therapy history for the same diagnosis, illness or injury: (Patient-Rptd) Yes (Patient-Rptd) Pt    Living Environment  Social support: (Patient-Rptd) With family members   Type of home: (Patient-Rptd) House; Multi-level   Stairs to enter the home: (Patient-Rptd) Yes   Is there a railing: (Patient-Rptd) Yes     Ramp: (Patient-Rptd) No   Stairs inside the home: (Patient-Rptd) Yes (Patient-Rptd) 12 Is there a railing: (Patient-Rptd) Yes     Help at home: (Patient-Rptd) Home and Yard maintenance tasks  Equipment owned: (Patient-Rptd) Straight Cane; Walker; Walker with wheels; Commode; Raised toilet seat; Bath bench     Employment: (Patient-Rptd) Not Applicable    Hobbies/Interests:      Patient goals for therapy: (Patient-Rptd) Walk play golf, walk safely for longer, decrease fatigue    Pain assessment: Pain present, current 4/10     Objective   LUMBAR SPINE EVALUATION  POSTURE: Sitting Posture: Rounded shoulders, Forward head, guarded in trunk, low back  GAIT:   Assistive Device(s): Cane (single end), Walker (front wheeled)  Gait Deviations:  using cane today, does occ use 2WW.  Decreased hip ext L>R, marked decreased toe off on L. Minimal trunk rotation.  BALANCE/PROPRIOCEPTION: Single Leg Stance Eyes Open (seconds): L 1\", R 3\"  ROM:  Lumbar flex 50%, ext 10%, SB for L and R 50%.  STRENGTH:  L ankle DF 2/5, PF 4-/5, ever 2/5 , inv 2/5.  L knee ext 4+5, flex WFL, hip abd 4/5.  Unable to perform walking on heels or toes  PALPATION:  moderate decreased sensation at plantar and dorsal surface of L foot    Assessment & Plan   CLINICAL IMPRESSIONS  Medical Diagnosis: Lumbar radiculopathy    Treatment Diagnosis: Lumbar radiculopathy, " gait abnormality   Impression/Assessment: Patient is a 79 year old male with lumbar radiculopathy L complaints.  The following significant findings have been identified: Pain, Decreased ROM/flexibility, Decreased strength, Impaired muscle performance, Decreased activity tolerance, and Impaired posture. These impairments interfere with their ability to perform self care tasks, recreational activities, household mobility, and community mobility as compared to previous level of function.     Clinical Decision Making (Complexity):  Clinical Presentation: Stable/Uncomplicated  Clinical Presentation Rationale: based on medical and personal factors listed in PT evaluation  Clinical Decision Making (Complexity): Low complexity    PLAN OF CARE  Treatment Interventions:  Interventions: Manual Therapy, Neuromuscular Re-education, Therapeutic Activity, Therapeutic Exercise, Self-Care/Home Management    Long Term Goals     PT Goal 1  Goal Identifier: ambulation  Goal Description: Able to ambulate with cane for 20 minutes, pain 3/10  Rationale: to maximize safety and independence with performance of ADLs and functional tasks;to maximize safety and independence within the home;to maximize safety and independence within the community;to maximize safety and independence with self cares  Target Date: 01/06/25  PT Goal 2  Goal Identifier: Fatigue  Goal Description: Improve FACIT score to 34 (initial score 24)  Rationale: to maximize safety and independence with performance of ADLs and functional tasks;to maximize safety and independence within the community;to maximize safety and independence within the home;to maximize safety and independence with self cares  Target Date: 03/06/25      Frequency of Treatment: 1x wk x 8 wks, 2x month x 2 months  Duration of Treatment: 4 months    Education Assessment:        Risks and benefits of evaluation/treatment have been explained.   Patient/Family/caregiver agrees with Plan of Care.      Evaluation Time:     PT Eval, Low Complexity Minutes (65299): 35     Signing Clinician: FABIOLA [facility]                                                                                   OUTPATIENT PHYSICAL THERAPY      PLAN OF TREATMENT FOR OUTPATIENT REHABILITATION   Patient's Last Name, First Name, JOSE ROBERTOIsaiAVELINAIsai  Jerry Miguel YOB: 1945   Provider's Name   [facility]   Medical Record No.  [id number]     Onset Date: 10/14/24  Start of Care Date: 11/06/24     Medical Diagnosis:  Lumbar radiculopathy      PT Treatment Diagnosis:  Lumbar radiculopathy, gait abnormality Plan of Treatment  Frequency/Duration: 1x wk x 8 wks, 2x month x 2 months/ 4 months    Certification date from 11/06/24 to 02/03/25         See note for plan of treatment details and functional goals     Yvon Euceda, PT                         I CERTIFY THE NEED FOR THESE SERVICES FURNISHED UNDER        THIS PLAN OF TREATMENT AND WHILE UNDER MY CARE     (Physician attestation of this document indicates review and certification of the therapy plan).              Referring Provider:  Simon Main    Initial Assessment  See Epic Evaluation- Start of Care Date: 11/06/24

## 2024-11-19 ENCOUNTER — THERAPY VISIT (OUTPATIENT)
Dept: PHYSICAL THERAPY | Facility: CLINIC | Age: 79
End: 2024-11-19
Payer: COMMERCIAL

## 2024-11-19 DIAGNOSIS — M54.16 LUMBAR RADICULOPATHY: Primary | ICD-10-CM

## 2024-11-19 PROCEDURE — 97112 NEUROMUSCULAR REEDUCATION: CPT | Mod: GP | Performed by: PHYSICAL THERAPIST

## 2024-11-19 PROCEDURE — 97110 THERAPEUTIC EXERCISES: CPT | Mod: GP | Performed by: PHYSICAL THERAPIST

## 2024-11-27 NOTE — PROGRESS NOTES
Chief Complaint:   Chief Complaint   Patient presents with    Left Hip - Pain     Onset: 6 weeks. NKI. Patient notes he played golf two days in a row and he developed severe pain. He couldn't sleep. Pain is in the buttocks and will wrap around the leg. Pain can radiate down the leg. Patient uses a walker/cane as needed. He will get numbness and tingling in the leg. Pain has gotten a lot better. He has stenosis in the back. Walking causes increased pain. He limps when he walks.        HISTORY OF PRESENT ILLNESS    Jerry Miguel is a 79 year old male seen for evaluation of ongoing left hip pain with no known injury.   Pain has been present for 6 weeks. Onset after playing golf for two days in a row. Couldn't sleep. Pain in the mid back, to the buttocks and wraps around the thigh/hip to the groin and can radiate down the leg into the foot. He will get numbness down the leg. Since onset, has improved a lot. Pain increased with walking, activities.    He's used a walker or cane as needed.    Has chronic low back pain, left lower extremity radiculopathy followed by pain management. MRI shows stenosis. Had an injection in the back which has helped. Also doing therapy.      Perceived limb length inequality:  not sure    History right and left total knee arthroplasty.    Present symptoms: pain laterally, anteriorly, posteriorly, and down the leg to the foot, pain dull/achy , mild pain.    Pain severity: 2/10  Pain quality: aching  Frequency of symptoms: are constant  Exacerbating Factors: weight bearing, stairs, [id number]  Relieving Factors: rest, sitting, positional changes   Pain while at rest: Yes   Associated numbness or tingling: Yes     Patient has tried:     NSAIDS: Yes      Acetaminophen: Yes     Opioids: No     Physical Therapy: Yes      Home exercise program: Yes      Activity modification: Yes       Assistive device:  Yes, cane and walker, depends.     Injections:  into the low back     Ice: No      Heat:  No      Topicals: No       Orthopedic PMH: right and left total knee arthroplasty.    Other PMH:  has a past medical history of Arthritis, Dacosta's esophagus without dysplasia (06/28/2022), BPH (benign prostatic hyperplasia), Chronic prostatitis, Gastro-oesophageal reflux disease, GERD (gastroesophageal reflux disease) (06/08/2012), Hypertension, Kidney stone, Seasonal allergic rhinitis (06/08/2012), Shingles, and Thyroid disease (2001-about).    He has no past medical history of Basal cell carcinoma, Malignant melanoma (H), Malignant neoplasm (H), Skin cancer, or Squamous cell carcinoma of skin, unspecified.  Patient Active Problem List   Diagnosis    Osteoarthritis of knee    CARDIOVASCULAR SCREENING; LDL GOAL LESS THAN 130    Seasonal allergic rhinitis    Hyperlipidemia LDL goal <130    Benign non-nodular prostatic hyperplasia with lower urinary tract symptoms    GERD (gastroesophageal reflux disease)    Hyperthyroidism    Essential hypertension with goal blood pressure less than 140/90    Right knee DJD    Seasonal depression (H)    Nuclear senile cataract of both eyes    Arthritis of knee    Age-related osteoporosis without current pathological fracture    Chronic pain of left knee    Personal history of other endocrine, nutritional and metabolic disease    Diverticulosis of colon    Dacosta's esophagus without dysplasia    Mass of skin of back    Lumbar radiculopathy       Surgical Hx:  has a past surgical history that includes appendectomy; Laser holmium lithotripsy ureter(s), insert stent, combined (10/11/2012); colonoscopy; Arthroplasty knee (Right, 1/4/2016); Colonoscopy with CO2 insufflation (N/A, 3/2/2018); ENT surgery; Arthroplasty knee (Left, 9/15/2022); and Excise tumor, shoulder, 3cm or greater (Left, 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    Medications:   Current Outpatient Medications:     acetaminophen (TYLENOL) 325 MG tablet, Take 2 tablets (650 mg) by mouth every 4 hours as needed for other (mild pain), Disp: 100  tablet, Rfl: 0    acetaminophen (TYLENOL) 650 MG CR tablet, Take 1,300 mg by mouth nightly as needed for mild pain, Disp: , Rfl:     amoxicillin (AMOXIL) 500 MG capsule, Take 4 capsules (2,000 mg) by mouth once as needed (Take 1 hour prior to dental procedure) (Patient not taking: Reported on 11/1/2024), Disp: 4 capsule, Rfl: 3    aspirin 81 MG EC tablet, Take 1 tablet (81 mg) by mouth 2 times daily (Patient not taking: Reported on 11/1/2024), Disp: 60 tablet, Rfl: 0    DULoxetine (CYMBALTA) 30 MG capsule, Take 20 mg by mouth 2 times daily, Disp: , Rfl:     esomeprazole (NEXIUM) 40 MG DR capsule, TAKE 1 CAPSULE BY MOUTH DAILY AS NEEDED 30 TO 60 MINUTES BEFORE  EATING, Disp: 90 capsule, Rfl: 3    finasteride (PROSCAR) 5 MG tablet, TAKE 1 TABLET BY MOUTH DAILY, Disp: 90 tablet, Rfl: 3    fluticasone (VERAMYST) 27.5 MCG/SPRAY spray, Spray 1 spray into both nostrils daily as needed, Disp: , Rfl:     gabapentin (NEURONTIN) 100 MG capsule, Take 1 capsule (100 mg) by mouth every evening., Disp: 30 capsule, Rfl: 0    rosuvastatin (CRESTOR) 10 MG tablet, Take 1 tablet (10 mg) by mouth daily, Disp: 90 tablet, Rfl: 3    valsartan (DIOVAN) 160 MG tablet, Take 1 tablet (160 mg) by mouth daily., Disp: 90 tablet, Rfl: 0    valsartan (DIOVAN) 80 MG tablet, Take 1 tablet (80 mg) by mouth daily, Disp: 30 tablet, Rfl: 3    VITAMIN D, CHOLECALCIFEROL, PO, Take 400 Units by mouth daily, Disp: , Rfl:     Allergies:   Allergies   Allergen Reactions    Codeine Sulfate Nausea and Vomiting       Social Hx: retired.  reports that he quit smoking about 51 years ago. His smoking use included cigarettes. He started smoking about 61 years ago. He has a 10 pack-year smoking history. He has never used smokeless tobacco. He reports current alcohol use. He reports that he does not use drugs.    Family Hx: family history includes Cancer in his father, mother, and paternal grandfather; Cancer (age of onset: 68) in his brother; Diabetes in his maternal  "grandmother; Heart Disease in his father.    REVIEW OF SYSTEMS:  10 point ROS neg other than the symptoms noted above in the HPI and PAST MEDICAL HISTORY. Notables,  CONSTITUTIONAL:NEGATIVE for fever, chills, change in weight  INTEGUMENTARY/SKIN: NEGATIVE for worrisome rashes, moles or lesions  MUSCULOSKELETAL:See HPI above  NEURO: NEGATIVE for weakness, dizziness or paresthesias    PHYSICAL EXAM:  BP (!) 166/91   Pulse 73   Ht 1.791 m (5' 10.5\")   Wt 98 kg (216 lb)   BMI 30.55 kg/m     GENERAL APPEARANCE: healthy, alert, no distress  SKIN: no suspicious lesions or rashes  NEURO: Normal strength and tone, mentation intact and speech normal  PSYCH:  mentation appears normal and affect normal  RESPIRATORY: No increased work of breathing.  LYMPH: no palpable inguinal lymph nodes.    BILATERAL LOWER EXTREMITIES:  Gait: hunched, favors the left.   Bilateral  Quad atrophy, strength weak.  Intact sensation deep peroneal nerve, superficial peroneal nerve, med/lat tibial nerve, sural nerve, saphenous nerve  Intact EHL, EDL, TA, FHL, GS, quadriceps hamstrings and hip flexors   Bilateral calf soft and nttp or squeeze.  DTRs: achilles 2+, patella 2+.  Edema: trace  Bilateral varicose veins  Leg lengths: grossly symmetric at medial malleolus.      BACK EXAM  Lumbar range of motion: flexion to touch knees causes low back pain , extension decreased causes low back pain , side bend: adequate, causes low back pain   Seated SLR: negative , bilateral.  Supine SLR: negative , bilateral.     Sciatic Notch tenderness: positive without radiation    LEFT HIP EXAM:      Palpation: Tender:   left buttock, SI joint   Nontender: groin, greater trochanter  Strength:  4/5  Special tests:  Irritability (flexion/adduction/internal rotation) mild  Hip range of motion: Internal rotation: 25, External rotation: 50, Flexion 100      RIGHT HIP EXAM:    Palpation: Tender:   right gluteus medius   Nontender: right greater trochanter  Strength:  " 4+/5  Special tests:  Irritability (flexion/adduction/internal rotation) negative.  Hip range of motion: Internal rotation: 20, External rotation: 35, Flexion 100        X-RAY: AP pelvis and AP/Lateral views left hip from 12/2/2024 were reviewed in clinic today. No obvious fractures or dislocations. mild bilateral hip degenerative changes, right more than left. Bilateral cam and pincer deformity.    MRI:  lumbar spine 10/24/2024:  1.  At L5-S1 there is moderate to severe spinal canal stenosis and mild to moderate bilateral neural foraminal stenosis.  2.  At L4-L5 there is mild spinal canal stenosis and mild bilateral neural foraminal stenosis.  3.  Additional multilevel lumbar spondylosis, as detailed above.      Impression: 80yo male with acute left lower extremity pain, lumbar radiculopathy, mild bilateral hip osteoarthritis.    Plan:   Exam, images reviewed  I think most of his symptoms are likely originating in his low back and causing pain, numbness and tingling down the leg.   Sounds like gradual improvements in symptoms following injection on the low back, hopefully continues to improve  Does have some hip osteoarthritis, but more noted on the right hip on xrays, as well as more limited range of motion on exam right hip compared to the left.  Recommend Physical Therapy, home exercise program, over the counter medications as needed.  Activity modification  Consider image-guided hip injection in future as needed.  Return to clinic as needed.    * All questions were addressed and answered prior to discharge from clinic today. The patient acknowledges an understanding of and agreement with the plan set forth during today's visit. Patient was advised to call our office or MyChart us if any further questions arise upon leaving our office today.      Honorio Arreola M.D., M.S.  Dept. of Orthopaedic Surgery  [facility]

## 2024-12-02 ENCOUNTER — THERAPY VISIT (OUTPATIENT)
Dept: PHYSICAL THERAPY | Facility: CLINIC | Age: 79
End: 2024-12-02
Attending: PAIN MEDICINE
Payer: COMMERCIAL

## 2024-12-02 ENCOUNTER — OFFICE VISIT (OUTPATIENT)
Dept: ORTHOPEDICS | Facility: CLINIC | Age: 79
End: 2024-12-02
Payer: COMMERCIAL

## 2024-12-02 ENCOUNTER — ANCILLARY PROCEDURE (OUTPATIENT)
Dept: GENERAL RADIOLOGY | Facility: CLINIC | Age: 79
End: 2024-12-02
Attending: ORTHOPAEDIC SURGERY
Payer: COMMERCIAL

## 2024-12-02 VITALS
SYSTOLIC BLOOD PRESSURE: 166 MMHG | HEART RATE: 73 BPM | HEIGHT: 71 IN | WEIGHT: 216 LBS | BODY MASS INDEX: 30.24 KG/M2 | DIASTOLIC BLOOD PRESSURE: 91 MMHG

## 2024-12-02 DIAGNOSIS — M25.552 LEFT HIP PAIN: ICD-10-CM

## 2024-12-02 DIAGNOSIS — M54.16 LUMBAR RADICULOPATHY: Primary | ICD-10-CM

## 2024-12-02 DIAGNOSIS — M54.16 ACUTE LEFT LUMBAR RADICULOPATHY: Primary | ICD-10-CM

## 2024-12-02 PROCEDURE — 97530 THERAPEUTIC ACTIVITIES: CPT | Mod: GP | Performed by: PHYSICAL THERAPIST

## 2024-12-02 PROCEDURE — 97110 THERAPEUTIC EXERCISES: CPT | Mod: GP | Performed by: PHYSICAL THERAPIST

## 2024-12-02 PROCEDURE — 73502 X-RAY EXAM HIP UNI 2-3 VIEWS: CPT | Mod: TC | Performed by: RADIOLOGY

## 2024-12-02 PROCEDURE — 97112 NEUROMUSCULAR REEDUCATION: CPT | Mod: GP | Performed by: PHYSICAL THERAPIST

## 2024-12-02 PROCEDURE — 99213 OFFICE O/P EST LOW 20 MIN: CPT | Performed by: ORTHOPAEDIC SURGERY

## 2024-12-02 ASSESSMENT — PAIN SCALES - GENERAL: PAINLEVEL_OUTOF10: MILD PAIN (2)

## 2024-12-02 NOTE — LETTER
12/2/2024      Jerry Miguel  [address] [place] [address]      Dear Colleague,    Thank you for referring your patient, Jerry Miguel, to the [facility]. Please see a copy of my visit note below.    Chief Complaint:   Chief Complaint   Patient presents with     Left Hip - Pain     Onset: 6 weeks. NKI. Patient notes he played golf two days in a row and he developed severe pain. He couldn't sleep. Pain is in the buttocks and will wrap around the leg. Pain can radiate down the leg. Patient uses a walker/cane as needed. He will get numbness and tingling in the leg. Pain has gotten a lot better. He has stenosis in the back. Walking causes increased pain. He limps when he walks.        HISTORY OF PRESENT ILLNESS    Jerry Miguel is a 79 year old male seen for evaluation of ongoing left hip pain with no known injury.   Pain has been present for 6 weeks. Onset after playing golf for two days in a row. Couldn't sleep. Pain in the mid back, to the buttocks and wraps around the thigh/hip to the groin and can radiate down the leg into the foot. He will get numbness down the leg. Since onset, has improved a lot. Pain increased with walking, activities.    He's used a walker or cane as needed.    Has chronic low back pain, left lower extremity radiculopathy followed by pain management. MRI shows stenosis. Had an injection in the back which has helped. Also doing therapy.      Perceived limb length inequality:  not sure    History right and left total knee arthroplasty.    Present symptoms: pain laterally, anteriorly, posteriorly, and down the leg to the foot, pain dull/achy , mild pain.    Pain severity: 2/10  Pain quality: aching  Frequency of symptoms: are constant  Exacerbating Factors: weight bearing, stairs, [id number]  Relieving Factors: rest, sitting, positional changes   Pain while at rest: Yes   Associated numbness or tingling: Yes     Patient has tried:     NSAIDS: Yes       Acetaminophen: Yes     Opioids: No     Physical Therapy: Yes      Home exercise program: Yes      Activity modification: Yes       Assistive device:  Yes, cane and walker, depends.     Injections:  into the low back     Ice: No      Heat: No      Topicals: No       Orthopedic PMH: right and left total knee arthroplasty.    Other PMH:  has a past medical history of Arthritis, Dacosta's esophagus without dysplasia (06/28/2022), BPH (benign prostatic hyperplasia), Chronic prostatitis, Gastro-oesophageal reflux disease, GERD (gastroesophageal reflux disease) (06/08/2012), Hypertension, Kidney stone, Seasonal allergic rhinitis (06/08/2012), Shingles, and Thyroid disease (2001-about).    He has no past medical history of Basal cell carcinoma, Malignant melanoma (H), Malignant neoplasm (H), Skin cancer, or Squamous cell carcinoma of skin, unspecified.  Patient Active Problem List   Diagnosis     Osteoarthritis of knee     CARDIOVASCULAR SCREENING; LDL GOAL LESS THAN 130     Seasonal allergic rhinitis     Hyperlipidemia LDL goal <130     Benign non-nodular prostatic hyperplasia with lower urinary tract symptoms     GERD (gastroesophageal reflux disease)     Hyperthyroidism     Essential hypertension with goal blood pressure less than 140/90     Right knee DJD     Seasonal depression (H)     Nuclear senile cataract of both eyes     Arthritis of knee     Age-related osteoporosis without current pathological fracture     Chronic pain of left knee     Personal history of other endocrine, nutritional and metabolic disease     Diverticulosis of colon     Dacosta's esophagus without dysplasia     Mass of skin of back     Lumbar radiculopathy       Surgical Hx:  has a past surgical history that includes appendectomy; Laser holmium lithotripsy ureter(s), insert stent, combined (10/11/2012); colonoscopy; Arthroplasty knee (Right, 1/4/2016); Colonoscopy with CO2 insufflation (N/A, 3/2/2018); ENT surgery; Arthroplasty knee (Left,  9/15/2022); and Excise tumor, shoulder, 3cm or greater (Left, 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    Medications:   Current Outpatient Medications:      acetaminophen (TYLENOL) 325 MG tablet, Take 2 tablets (650 mg) by mouth every 4 hours as needed for other (mild pain), Disp: 100 tablet, Rfl: 0     acetaminophen (TYLENOL) 650 MG CR tablet, Take 1,300 mg by mouth nightly as needed for mild pain, Disp: , Rfl:      amoxicillin (AMOXIL) 500 MG capsule, Take 4 capsules (2,000 mg) by mouth once as needed (Take 1 hour prior to dental procedure) (Patient not taking: Reported on 11/1/2024), Disp: 4 capsule, Rfl: 3     aspirin 81 MG EC tablet, Take 1 tablet (81 mg) by mouth 2 times daily (Patient not taking: Reported on 11/1/2024), Disp: 60 tablet, Rfl: 0     DULoxetine (CYMBALTA) 30 MG capsule, Take 20 mg by mouth 2 times daily, Disp: , Rfl:      esomeprazole (NEXIUM) 40 MG DR capsule, TAKE 1 CAPSULE BY MOUTH DAILY AS NEEDED 30 TO 60 MINUTES BEFORE  EATING, Disp: 90 capsule, Rfl: 3     finasteride (PROSCAR) 5 MG tablet, TAKE 1 TABLET BY MOUTH DAILY, Disp: 90 tablet, Rfl: 3     fluticasone (VERAMYST) 27.5 MCG/SPRAY spray, Spray 1 spray into both nostrils daily as needed, Disp: , Rfl:      gabapentin (NEURONTIN) 100 MG capsule, Take 1 capsule (100 mg) by mouth every evening., Disp: 30 capsule, Rfl: 0     rosuvastatin (CRESTOR) 10 MG tablet, Take 1 tablet (10 mg) by mouth daily, Disp: 90 tablet, Rfl: 3     valsartan (DIOVAN) 160 MG tablet, Take 1 tablet (160 mg) by mouth daily., Disp: 90 tablet, Rfl: 0     valsartan (DIOVAN) 80 MG tablet, Take 1 tablet (80 mg) by mouth daily, Disp: 30 tablet, Rfl: 3     VITAMIN D, CHOLECALCIFEROL, PO, Take 400 Units by mouth daily, Disp: , Rfl:     Allergies:   Allergies   Allergen Reactions     Codeine Sulfate Nausea and Vomiting       Social Hx: retired.  reports that he quit smoking about 51 years ago. His smoking use included cigarettes. He started smoking about 61 years ago. He has a 10 pack-year smoking  "history. He has never used smokeless tobacco. He reports current alcohol use. He reports that he does not use drugs.    Family Hx: family history includes Cancer in his father, mother, and paternal grandfather; Cancer (age of onset: 68) in his brother; Diabetes in his maternal grandmother; Heart Disease in his father.    REVIEW OF SYSTEMS:  10 point ROS neg other than the symptoms noted above in the HPI and PAST MEDICAL HISTORY. Notables,  CONSTITUTIONAL:NEGATIVE for fever, chills, change in weight  INTEGUMENTARY/SKIN: NEGATIVE for worrisome rashes, moles or lesions  MUSCULOSKELETAL:See HPI above  NEURO: NEGATIVE for weakness, dizziness or paresthesias    PHYSICAL EXAM:  BP (!) 166/91   Pulse 73   Ht 1.791 m (5' 10.5\")   Wt 98 kg (216 lb)   BMI 30.55 kg/m     GENERAL APPEARANCE: healthy, alert, no distress  SKIN: no suspicious lesions or rashes  NEURO: Normal strength and tone, mentation intact and speech normal  PSYCH:  mentation appears normal and affect normal  RESPIRATORY: No increased work of breathing.  LYMPH: no palpable inguinal lymph nodes.    BILATERAL LOWER EXTREMITIES:  Gait: hunched, favors the left.   Bilateral  Quad atrophy, strength weak.  Intact sensation deep peroneal nerve, superficial peroneal nerve, med/lat tibial nerve, sural nerve, saphenous nerve  Intact EHL, EDL, TA, FHL, GS, quadriceps hamstrings and hip flexors   Bilateral calf soft and nttp or squeeze.  DTRs: achilles 2+, patella 2+.  Edema: trace  Bilateral varicose veins  Leg lengths: grossly symmetric at medial malleolus.      BACK EXAM  Lumbar range of motion: flexion to touch knees causes low back pain , extension decreased causes low back pain , side bend: adequate, causes low back pain   Seated SLR: negative , bilateral.  Supine SLR: negative , bilateral.     Sciatic Notch tenderness: positive without radiation    LEFT HIP EXAM:      Palpation: Tender:   left buttock, SI joint   Nontender: groin, greater trochanter  Strength:  " 4/5  Special tests:  Irritability (flexion/adduction/internal rotation) mild  Hip range of motion: Internal rotation: 25, External rotation: 50, Flexion 100      RIGHT HIP EXAM:    Palpation: Tender:   right gluteus medius   Nontender: right greater trochanter  Strength:  4+/5  Special tests:  Irritability (flexion/adduction/internal rotation) negative.  Hip range of motion: Internal rotation: 20, External rotation: 35, Flexion 100        X-RAY: AP pelvis and AP/Lateral views left hip from 12/2/2024 were reviewed in clinic today. No obvious fractures or dislocations. mild bilateral hip degenerative changes, right more than left. Bilateral cam and pincer deformity.    MRI:  lumbar spine 10/24/2024:  1.  At L5-S1 there is moderate to severe spinal canal stenosis and mild to moderate bilateral neural foraminal stenosis.  2.  At L4-L5 there is mild spinal canal stenosis and mild bilateral neural foraminal stenosis.  3.  Additional multilevel lumbar spondylosis, as detailed above.      Impression: 80yo male with acute left lower extremity pain, lumbar radiculopathy, mild bilateral hip osteoarthritis.    Plan:   Exam, images reviewed  I think most of his symptoms are likely originating in his low back and causing pain, numbness and tingling down the leg.   Sounds like gradual improvements in symptoms following injection on the low back, hopefully continues to improve  Does have some hip osteoarthritis, but more noted on the right hip on xrays, as well as more limited range of motion on exam right hip compared to the left.  Recommend Physical Therapy, home exercise program, over the counter medications as needed.  Activity modification  Consider image-guided hip injection in future as needed.  Return to clinic as needed.    * All questions were addressed and answered prior to discharge from clinic today. The patient acknowledges an understanding of and agreement with the plan set forth during today's visit. Patient was  advised to call our office or MyChart us if any further questions arise upon leaving our office today.      Honorio Arreola M.D., M.S.  Dept. of Orthopaedic Surgery  [facility]            Again, thank you for allowing me to participate in the care of your patient.        Sincerely,        Honorio Arreola MD

## 2024-12-11 ENCOUNTER — THERAPY VISIT (OUTPATIENT)
Dept: PHYSICAL THERAPY | Facility: CLINIC | Age: 79
End: 2024-12-11
Payer: COMMERCIAL

## 2024-12-11 DIAGNOSIS — M54.16 LUMBAR RADICULOPATHY: Primary | ICD-10-CM

## 2024-12-11 PROCEDURE — 97112 NEUROMUSCULAR REEDUCATION: CPT | Mod: GP | Performed by: PHYSICAL THERAPIST

## 2024-12-11 PROCEDURE — 97110 THERAPEUTIC EXERCISES: CPT | Mod: GP | Performed by: PHYSICAL THERAPIST

## 2024-12-18 ENCOUNTER — THERAPY VISIT (OUTPATIENT)
Dept: PHYSICAL THERAPY | Facility: CLINIC | Age: 79
End: 2024-12-18
Payer: COMMERCIAL

## 2024-12-18 DIAGNOSIS — M54.16 LUMBAR RADICULOPATHY: Primary | ICD-10-CM

## 2024-12-18 PROCEDURE — 97110 THERAPEUTIC EXERCISES: CPT | Mod: GP | Performed by: PHYSICAL THERAPIST

## 2024-12-18 PROCEDURE — 97112 NEUROMUSCULAR REEDUCATION: CPT | Mod: GP | Performed by: PHYSICAL THERAPIST

## 2024-12-24 ENCOUNTER — THERAPY VISIT (OUTPATIENT)
Dept: PHYSICAL THERAPY | Facility: CLINIC | Age: 79
End: 2024-12-24
Payer: COMMERCIAL

## 2024-12-24 DIAGNOSIS — M54.16 LUMBAR RADICULOPATHY: Primary | ICD-10-CM

## 2024-12-24 PROCEDURE — 97112 NEUROMUSCULAR REEDUCATION: CPT | Mod: GP | Performed by: PHYSICAL THERAPIST

## 2024-12-24 PROCEDURE — 97110 THERAPEUTIC EXERCISES: CPT | Mod: GP | Performed by: PHYSICAL THERAPIST

## 2024-12-31 ENCOUNTER — THERAPY VISIT (OUTPATIENT)
Dept: PHYSICAL THERAPY | Facility: CLINIC | Age: 79
End: 2024-12-31
Payer: COMMERCIAL

## 2024-12-31 DIAGNOSIS — M54.16 LUMBAR RADICULOPATHY: Primary | ICD-10-CM

## 2024-12-31 PROCEDURE — 97112 NEUROMUSCULAR REEDUCATION: CPT | Mod: GP | Performed by: PHYSICAL THERAPIST

## 2024-12-31 PROCEDURE — 97110 THERAPEUTIC EXERCISES: CPT | Mod: GP | Performed by: PHYSICAL THERAPIST

## 2025-01-08 ENCOUNTER — THERAPY VISIT (OUTPATIENT)
Dept: PHYSICAL THERAPY | Facility: CLINIC | Age: 80
End: 2025-01-08
Payer: COMMERCIAL

## 2025-01-08 DIAGNOSIS — M54.16 LUMBAR RADICULOPATHY: Primary | ICD-10-CM

## 2025-01-08 PROCEDURE — 97110 THERAPEUTIC EXERCISES: CPT | Mod: GP | Performed by: PHYSICAL THERAPIST

## 2025-01-08 PROCEDURE — 97112 NEUROMUSCULAR REEDUCATION: CPT | Mod: GP | Performed by: PHYSICAL THERAPIST

## 2025-01-09 ENCOUNTER — TELEPHONE (OUTPATIENT)
Dept: FAMILY MEDICINE | Facility: CLINIC | Age: 80
End: 2025-01-09
Payer: COMMERCIAL

## 2025-01-09 NOTE — TELEPHONE ENCOUNTER
Patient Quality Outreach    Patient is due for the following:   Hypertension -  BP check    Action(s) Taken: Bloor pressure readings taken since last visit       Type of outreach:    Sent TapTrack message.    Questions for provider review:    None           April Adams MA  Chart routed to Care Team.

## 2025-01-15 ENCOUNTER — THERAPY VISIT (OUTPATIENT)
Dept: PHYSICAL THERAPY | Facility: CLINIC | Age: 80
End: 2025-01-15
Payer: COMMERCIAL

## 2025-01-15 DIAGNOSIS — M54.16 LUMBAR RADICULOPATHY: Primary | ICD-10-CM

## 2025-01-15 PROCEDURE — 97110 THERAPEUTIC EXERCISES: CPT | Mod: GP | Performed by: PHYSICAL THERAPIST

## 2025-01-15 PROCEDURE — 97112 NEUROMUSCULAR REEDUCATION: CPT | Mod: GP | Performed by: PHYSICAL THERAPIST

## 2025-01-21 ENCOUNTER — VIRTUAL VISIT (OUTPATIENT)
Dept: FAMILY MEDICINE | Facility: CLINIC | Age: 80
End: 2025-01-21
Payer: COMMERCIAL

## 2025-01-21 DIAGNOSIS — K21.00 GASTROESOPHAGEAL REFLUX DISEASE WITH ESOPHAGITIS WITHOUT HEMORRHAGE: Primary | ICD-10-CM

## 2025-01-21 DIAGNOSIS — K22.70 BARRETT'S ESOPHAGUS WITHOUT DYSPLASIA: ICD-10-CM

## 2025-01-21 PROCEDURE — 98005 SYNCH AUDIO-VIDEO EST LOW 20: CPT | Performed by: NURSE PRACTITIONER

## 2025-01-21 NOTE — PROGRESS NOTES
"Jerry is a 79 year old who is being evaluated via a billable video visit.    How would you like to obtain your AVS? MyChart  If the video visit is dropped, the invitation should be resent by: Send to e-mail at: [email or web address]  Will anyone else be joining your video visit? No      Assessment & Plan     Gastroesophageal reflux disease with esophagitis without hemorrhage  - Previously saw GI in 2019 and is currently taking Nexium for symptom management. He states the medication is no longer working and he is agreeable to seeing GI for consult; he is willing to do EGD if necessary.  - Adult GI  Referral - Consult Only; Future    Dacosta's esophagus without dysplasia  - Adult GI  Referral - Consult Only; Future          BMI  Estimated body mass index is 30.55 kg/m  as calculated from the following:    Height as of 12/2/24: 1.791 m (5' 10.5\").    Weight as of 12/2/24: 98 kg (216 lb).   Weight management plan: Discussed healthy diet and exercise guidelines          Braulio Edwards is a 79 year old, presenting for the following health issues:  Gastrophageal Reflux (Recheck /Waking up at night. //) and Referral (To GI specilty )      1/21/2025     1:18 PM   Additional Questions   Roomed by Lily CORTEZ     Video Start Time:  1:44 Pm     History of GERD for 25 years, been on Nexium for management of symptoms.  He previously had an EGD in 2022, with biopsy negative for dysplasia. He complains of vomiting, nausea and heartburn increasingly at night wakes up around 4 times a night. He states he has modified his diet to sugar, minimal coffee about a cup a day and avoids fried fatty foods. He is currently taking Nexium twice a day 40 Mg in the morning and 40 Mg at night. He has been doing this for about two weeks which changed from him just taking 40 mg once day in the morning. He states his blood pressure elevated with a recent acid reflux attack and BP was 200/130s. He states his blood pressure " returned to normal when the pain decreased. He has noticed stress increases the occurrence of GERD attacks and states he noticed this about 2 years ago.     History of Present Illness       Reason for visit:  Severe Gerd   He is taking medications regularly.  Asking for a referral to GI specialty                     Objective           Vitals:  No vitals were obtained today due to virtual visit.    Physical Exam   GENERAL: alert and no distress  EYES: Eyes grossly normal to inspection.  No discharge or erythema, or obvious scleral/conjunctival abnormalities.  RESP: No audible wheeze, cough, or visible cyanosis.    SKIN: Visible skin clear. No significant rash, abnormal pigmentation or lesions.  NEURO: Cranial nerves grossly intact.  Mentation and speech appropriate for age.  PSYCH: Appropriate affect, tone, and pace of words          Video-Visit Details  Joined the call at 1/21/2025, 1:44:31 pm.  Left the call at 1/21/2025, 1:59:01 pm.  You were on the call for 14 minutes 30 seconds .  Type of service:  Video Visit   Video End Time: 1:59 pm  Originating Location (pt. Location): Home    Distant Location (provider location):  On-site  Platform used for Video Visit: ROXANNA Aguayo acting as a scribe for Sobia Yeugn DNP, APRN CNP    The above patient was seen and evaluated with the NP student who acted as my scribe for the above note.    Sobia Yeung DNP, ZAK, CNP     Signed Electronically by: Sobia Yeung NP

## 2025-02-06 ENCOUNTER — TRANSFERRED RECORDS (OUTPATIENT)
Dept: HEALTH INFORMATION MANAGEMENT | Facility: CLINIC | Age: 80
End: 2025-02-06
Payer: COMMERCIAL

## 2025-02-12 PROBLEM — K22.70 BARRETT'S ESOPHAGUS WITHOUT DYSPLASIA: Status: ACTIVE | Noted: 2022-06-28

## 2025-02-17 DIAGNOSIS — I10 ESSENTIAL HYPERTENSION WITH GOAL BLOOD PRESSURE LESS THAN 140/90: ICD-10-CM

## 2025-02-17 RX ORDER — VALSARTAN 160 MG/1
TABLET ORAL
Refills: 0 | OUTPATIENT
Start: 2025-02-17

## 2025-02-17 RX ORDER — VALSARTAN 160 MG/1
160 TABLET ORAL DAILY
Qty: 90 TABLET | Refills: 0 | Status: SHIPPED | OUTPATIENT
Start: 2025-02-17

## 2025-02-20 ENCOUNTER — TRANSFERRED RECORDS (OUTPATIENT)
Dept: HEALTH INFORMATION MANAGEMENT | Facility: CLINIC | Age: 80
End: 2025-02-20
Payer: COMMERCIAL

## 2025-03-08 ENCOUNTER — HEALTH MAINTENANCE LETTER (OUTPATIENT)
Age: 80
End: 2025-03-08

## 2025-03-10 ENCOUNTER — OFFICE VISIT (OUTPATIENT)
Dept: FAMILY MEDICINE | Facility: CLINIC | Age: 80
End: 2025-03-10
Payer: COMMERCIAL

## 2025-03-10 VITALS
DIASTOLIC BLOOD PRESSURE: 78 MMHG | BODY MASS INDEX: 28.76 KG/M2 | TEMPERATURE: 97.9 F | SYSTOLIC BLOOD PRESSURE: 118 MMHG | RESPIRATION RATE: 16 BRPM | OXYGEN SATURATION: 96 % | WEIGHT: 205.4 LBS | HEIGHT: 71 IN | HEART RATE: 89 BPM

## 2025-03-10 DIAGNOSIS — K21.00 GASTROESOPHAGEAL REFLUX DISEASE WITH ESOPHAGITIS WITHOUT HEMORRHAGE: ICD-10-CM

## 2025-03-10 DIAGNOSIS — E78.5 HYPERLIPIDEMIA LDL GOAL <130: ICD-10-CM

## 2025-03-10 DIAGNOSIS — L71.9 ROSACEA: ICD-10-CM

## 2025-03-10 DIAGNOSIS — I10 ESSENTIAL HYPERTENSION WITH GOAL BLOOD PRESSURE LESS THAN 140/90: ICD-10-CM

## 2025-03-10 DIAGNOSIS — K44.9 HIATAL HERNIA: ICD-10-CM

## 2025-03-10 DIAGNOSIS — Z23 NEED FOR COVID-19 VACCINE: ICD-10-CM

## 2025-03-10 DIAGNOSIS — Z01.818 PREOP GENERAL PHYSICAL EXAM: Primary | ICD-10-CM

## 2025-03-10 LAB
ANION GAP SERPL CALCULATED.3IONS-SCNC: 14 MMOL/L (ref 7–15)
BUN SERPL-MCNC: 20.2 MG/DL (ref 8–23)
CALCIUM SERPL-MCNC: 9.9 MG/DL (ref 8.8–10.4)
CHLORIDE SERPL-SCNC: 105 MMOL/L (ref 98–107)
CREAT SERPL-MCNC: 1.15 MG/DL (ref 0.67–1.17)
EGFRCR SERPLBLD CKD-EPI 2021: 66 ML/MIN/1.73M2
GLUCOSE SERPL-MCNC: 106 MG/DL (ref 70–99)
HCO3 SERPL-SCNC: 26 MMOL/L (ref 22–29)
HGB BLD-MCNC: 14.7 G/DL (ref 13.3–17.7)
POTASSIUM SERPL-SCNC: 4.8 MMOL/L (ref 3.4–5.3)
SODIUM SERPL-SCNC: 145 MMOL/L (ref 135–145)

## 2025-03-10 PROCEDURE — 36415 COLL VENOUS BLD VENIPUNCTURE: CPT | Performed by: NURSE PRACTITIONER

## 2025-03-10 PROCEDURE — 80048 BASIC METABOLIC PNL TOTAL CA: CPT | Performed by: NURSE PRACTITIONER

## 2025-03-10 PROCEDURE — 99214 OFFICE O/P EST MOD 30 MIN: CPT | Mod: 25 | Performed by: NURSE PRACTITIONER

## 2025-03-10 PROCEDURE — 91320 SARSCV2 VAC 30MCG TRS-SUC IM: CPT | Performed by: NURSE PRACTITIONER

## 2025-03-10 PROCEDURE — 3074F SYST BP LT 130 MM HG: CPT | Performed by: NURSE PRACTITIONER

## 2025-03-10 PROCEDURE — 3078F DIAST BP <80 MM HG: CPT | Performed by: NURSE PRACTITIONER

## 2025-03-10 PROCEDURE — 90480 ADMN SARSCOV2 VAC 1/ONLY CMP: CPT | Performed by: NURSE PRACTITIONER

## 2025-03-10 PROCEDURE — 85018 HEMOGLOBIN: CPT | Performed by: NURSE PRACTITIONER

## 2025-03-10 PROCEDURE — 1125F AMNT PAIN NOTED PAIN PRSNT: CPT | Performed by: NURSE PRACTITIONER

## 2025-03-10 ASSESSMENT — PAIN SCALES - GENERAL: PAINLEVEL_OUTOF10: MODERATE PAIN (5)

## 2025-03-10 NOTE — PATIENT INSTRUCTIONS
How to Take Your Medication Before Surgery  Preoperative Medication Instructions   Antiplatelet or Anticoagulation Medication Instructions   - aspirin: Discontinue aspirin 7 days prior to procedure to reduce bleeding risk. It should be resumed postoperatively.     Additional Medication Instructions   - ACE/ARB/ARNI (lisinopril, enalapril, losartan, valsartan, olmesartan, sacubritril/valsartan) : DO NOT TAKE on day of surgery (minimum 11 hours for general anesthesia).   - Statins (atorvastatin, simvastatin, pravastatin) : Continue taking on the day of surgery.    - pregabalin, gabapentin: Continue without modification.   - SSRIs, SNRIs, TCAs, Antipsychotics: Continue without modification.          Patient Education   Preparing for Your Surgery  For Adults  Getting started  In most cases, a nurse will call to review your health history and instructions. They will give you an arrival time based on your scheduled surgery time. Please be ready to share:  Your doctor's clinic name and phone number  Your medical, surgical, and anesthesia history  A list of allergies and sensitivities  A list of medicines, including herbal treatments and over-the-counter drugs  Whether the patient has a legal guardian (ask how to send us the papers in advance)  Note: You may not receive a call if you were seen at our PAC (Preoperative Assessment Center).  Please tell us if you're pregnant--or if there's any chance you might be pregnant. Some surgeries may injure a fetus (unborn baby), so they require a pregnancy test. Surgeries that are safe for a fetus don't always need a test, and you can choose whether to have one.   Preparing for surgery  Within 10 to 30 days of surgery: Have a pre-op exam (sometimes called an H&P, or History and Physical). This can be done at a clinic or pre-operative center.  If you're having a , you may not need this exam. Talk to your care team.  At your pre-op exam, talk to your care team about all  medicines you take. (This includes CBD oil and any drugs, such as THC, marijuana, and other forms of cannabis.) If you need to stop any medicine before surgery, ask when to start taking it again.  This is for your safety. Many medicines and drugs can make you bleed too much during surgery. Some change how well surgery (anesthesia) drugs work.  Call your insurance company to let them know you're having surgery. (If you don't have insurance, call [phone].)  Call your clinic if there's any change in your health. This includes a scrape or scratch near the surgery site, or any signs of a cold (sore throat, runny nose, cough, rash, fever).  Eating and drinking guidelines  For your safety: Unless your surgeon tells you otherwise, follow the guidelines below.  Eat and drink as normal until 8 hours before you arrive for surgery. After that, no food or milk. You can spit out gum when you arrive.  Drink clear liquids until 2 hours before you arrive. These are liquids you can see through, like water, Gatorade, and Propel Water. They also include plain black coffee and tea (no cream or milk).  No alcohol for 24 hours before you arrive. The night before surgery, stop any drinks that contain THC.  If your care team tells you to take medicine on the morning of surgery, it's okay to take it with a sip of water. No other medicines or drugs are allowed (including CBD oil)--follow your care team's instructions.  If you have questions the day of surgery, call your hospital or surgery center.   Preventing infection  Shower or bathe the night before and the morning of surgery. Follow the instructions your clinic gave you. (If no instructions, use regular soap.)  Don't shave or clip hair near your surgery site. We'll remove the hair if needed.  Don't smoke or vape the morning of surgery. No chewing tobacco for 6 hours before you arrive. A nicotine patch is okay. You may spit out nicotine gum when you arrive.  For some surgeries, the  surgeon will tell you to fully quit smoking and nicotine.  We will make every effort to keep you safe from infection. We will:  Clean our hands often with soap and water (or an alcohol-based hand rub).  Clean the skin at your surgery site with a special soap that kills germs.  Give you a special gown to keep you warm. (Cold raises the risk of infection.)  Wear hair covers, masks, gowns, and gloves during surgery.  Give antibiotic medicine, if prescribed. Not all surgeries need this medicine.  What to bring on the day of surgery  Photo ID and insurance card  Copy of your health care directive, if you have one  Glasses and hearing aids (bring cases)  You can't wear contacts during surgery  Inhaler and eye drops, if you use them (tell us about these when you arrive)  CPAP machine or breathing device, if you use them  A few personal items, if spending the night  If you have . . .  A pacemaker, ICD (cardiac defibrillator), or other implant: Bring the ID card.  An implanted stimulator: Bring the remote control.  A legal guardian: Bring a copy of the certified (court-stamped) guardianship papers.  Please remove any jewelry, including body piercings. Leave jewelry and other valuables at home.  If you're going home the day of surgery  You must have a responsible adult drive you home. They should stay with you overnight as well.  If you don't have someone to stay with you, and you aren't safe to go home alone, we may keep you overnight. Insurance often won't pay for this.  After surgery  If it's hard to control your pain or you need more pain medicine, please call your surgeon's office.  Questions?   If you have any questions for your care team, list them here:   ____________________________________________________________________________________________________________________________________________________________________________________________________________________________________________________________  For  informational purposes only. Not to replace the advice of your health care provider. Copyright   2003, 2019 [employer] [facility]. All rights reserved. Clinically reviewed by Alexander Finch MD. Plovgh [id number] - REV 08/24.

## 2025-03-10 NOTE — PROGRESS NOTES
Preoperative Evaluation  [address] [address]  Phone: [phone]  Fax: [phone]  Primary Provider: Sobia Yeung NP  Pre-op Performing Provider: Sobia Yeung NP  Mar 10, 2025             3/10/2025   Surgical Information   What procedure is being done? esophagus surgery   Facility or Hospital where procedure/surgery will be performed: [facility]   Who is doing the procedure / surgery? [facility]   Date of surgery / procedure: March 31   Time of surgery / procedure: 1pm   Where do you plan to recover after surgery? at home with family     Fax number for surgical facility: Note does not need to be faxed, will be available electronically in Epic.    Assessment & Plan     The proposed surgical procedure is considered INTERMEDIATE risk.    Preop general physical exam    - Hemoglobin; Future  - BASIC METABOLIC PANEL  - Hemoglobin    Gastroesophageal reflux disease with esophagitis without hemorrhage  Reason for surgery    Hiatal hernia  Reason for surgery    Essential hypertension with goal blood pressure less than 140/90  Known issue that I take into account for their medical decisions, no current exacerbations or new concerns.   - BASIC METABOLIC PANEL; Future  - BASIC METABOLIC PANEL    Rosacea  Discussed with patient the indication and use of medication(s), risks/benefits, and potential adverse side effects.  Patient/guardian verbalized understanding and agreement with the plan.   - metroNIDAZOLE (METROCREAM) 0.75 % external cream; Apply topically 2 times daily.    Need for COVID-19 vaccine    - COVID-19 12+ (PFIZER)    Hyperlipidemia LDL goal <130  Known issue that I take into account for their medical decisions, no current exacerbations or new concerns.             - No identified additional risk factors other than previously addressed    Antiplatelet or Anticoagulation Medication Instructions   - aspirin: Discontinue aspirin 7 days  prior to procedure to reduce bleeding risk. It should be resumed postoperatively.     Additional Medication Instructions   - ACE/ARB/ARNI (lisinopril, enalapril, losartan, valsartan, olmesartan, sacubritril/valsartan) : DO NOT TAKE on day of surgery (minimum 11 hours for general anesthesia).   - Statins (atorvastatin, simvastatin, pravastatin) : Continue taking on the day of surgery.    - pregabalin, gabapentin: Continue without modification.   - SSRIs, SNRIs, TCAs, Antipsychotics: Continue without modification.       Recommendation  Approval given to proceed with proposed procedure, without further diagnostic evaluation.    Braulio Edwards is a 79 year old, presenting for the following:  Pre-Op Exam (Hernia surgery,esophagus)          3/10/2025     1:50 PM   Additional Questions   Roomed by Brenda CHOWDHURY:    Would like to also discuss rosacea treatment.        3/10/2025   Pre-Op Questionnaire   Have you ever had a heart attack or stroke? No   Have you ever had surgery on your heart or blood vessels, such as a stent placement, a coronary artery bypass, or surgery on an artery in your head, neck, heart, or legs? No   Do you have chest pain with activity? (!) YES he gets burning and pain in the chest and shortness of breath when he gets a flare of reflux symptoms-he gets this pain both at rest and with exertion.   Do you have a history of heart failure? No   Do you currently have a cold, bronchitis or symptoms of other infection? No   Do you have a cough, shortness of breath, or wheezing? (!) YES he gets burning and pain in the chest and shortness of breath when he gets a flare of reflux symptoms-he gets this pain both at rest and with exertion.   Do you or anyone in your family have previous history of blood clots? No   Do you or does anyone in your family have a serious bleeding problem such as prolonged bleeding following surgeries or cuts? No   Have you ever had problems with anemia or been told to take iron  pills? No   Have you had any abnormal blood loss such as black, tarry or bloody stools? No   Have you ever had a blood transfusion? No   Are you willing to have a blood transfusion if it is medically needed before, during, or after your surgery? Yes   Have you or any of your relatives ever had problems with anesthesia? No   Do you have sleep apnea, excessive snoring or daytime drowsiness? No   Do you have any artifical heart valves or other implanted medical devices like a pacemaker, defibrillator, or continuous glucose monitor? No   Do you have artificial joints? (!) YES   Are you allergic to latex? No     Health Care Directive  Patient has a Health Care Directive on file      Preoperative Review of    reviewed - no record of controlled substances prescribed.      Status of Chronic Conditions:  See problem list for active medical problems.  Problems all longstanding and stable, except as noted/documented.  See ROS for pertinent symptoms related to these conditions.    Patient Active Problem List    Diagnosis Date Noted    Lumbar radiculopathy 11/06/2024     Priority: Medium    Mass of skin of back 07/08/2024     Priority: Medium    Diverticulosis of colon 05/21/2024     Priority: Medium    Arthritis of knee 09/15/2022     Priority: Medium    Age-related osteoporosis without current pathological fracture 09/15/2022     Priority: Medium    Chronic pain of left knee 09/15/2022     Priority: Medium    Personal history of other endocrine, nutritional and metabolic disease 09/15/2022     Priority: Medium    Dacosta's esophagus without dysplasia 06/28/2022     Priority: Medium     6/28/22:  Upper endoscopy through [facility] showed Dacosta's esophagus  2/6/25:  Upper endoscopy through [facility] showed esophagitis      Nuclear senile cataract of both eyes 12/13/2019     Priority: Medium    Seasonal depression 09/19/2019     Priority: Medium    Right knee DJD 01/04/2016     Priority: Medium    Essential hypertension  with goal blood pressure less than 140/90 07/06/2012     Priority: Medium    Osteoarthritis of knee 06/08/2012     Priority: Medium    CARDIOVASCULAR SCREENING; LDL GOAL LESS THAN 130 06/08/2012     Priority: Medium    Seasonal allergic rhinitis 06/08/2012     Priority: Medium    Hyperlipidemia LDL goal <130 06/08/2012     Priority: Medium    Benign non-nodular prostatic hyperplasia with lower urinary tract symptoms 06/08/2012     Priority: Medium    GERD (gastroesophageal reflux disease) 06/08/2012     Priority: Medium    Hyperthyroidism 06/08/2012     Priority: Medium      Past Medical History:   Diagnosis Date    Arthritis     knee    Dacosta's esophagus without dysplasia 06/28/2022    BPH (benign prostatic hyperplasia)     Chronic prostatitis     GERD (gastroesophageal reflux disease) 06/08/2012    Hypertension     Kidney stone     Seasonal allergic rhinitis 06/08/2012    Shingles     Spinal stenosis 10/2024    Thyroid disease 2001-about    h/o hyperthyroid,treated, now normal     Past Surgical History:   Procedure Laterality Date    APPENDECTOMY      ARTHROPLASTY KNEE Right 01/04/2016    Procedure: ARTHROPLASTY KNEE;  Surgeon: Manuel Ponce MD;  Location: [facility] OR    ARTHROPLASTY KNEE Left 09/15/2022    Procedure: ARTHROPLASTY, KNEE, TOTAL LEFT;  Surgeon: Jesus Rodriguez MD;  Location: [facility] OR    COLONOSCOPY      COLONOSCOPY WITH CO2 INSUFFLATION N/A 03/02/2018    Procedure: COLONOSCOPY WITH CO2 INSUFFLATION;  Colonoscopy, Constipation, unspecified constipation type Recent change in frequency of bowel movements, [place], BMI 31.52, [facility];  Surgeon: Edith Craig MD;  Location:  OR    ENT SURGERY      tonsillectomy    EXCISE TUMOR, SHOULDER, 3CM OR GREATER Left 07/31/2024    Procedure: EXCISION, NEOPLASM, SOFT TISSUE, LEFT UPPER BACK;  Surgeon: Jerel Gutierrez MD;  Location:  OR    LASER HOLMIUM LITHOTRIPSY URETER(S), INSERT STENT, COMBINED  10/11/2012    Procedure: COMBINED  CYSTOSCOPY, URETEROSCOPY, LASER HOLMIUM LITHOTRIPSY URETER(S), INSERT STENT;  CYSTOSCOPY, ATTEMPTED RIGHT URETEROSCOPY, INSERT RIGHT URETERAL STENT, [facility] [place];  Surgeon: Petar Ulrich MD;  Location: [facility] OR     he gets burning and pain in the chest and shortness of breath when he gets a flare of reflux symptoms-he gets this pain both at rest and with exertion.      Allergies   Allergen Reactions    Codeine Sulfate Nausea and Vomiting        Social History     Tobacco Use    Smoking status: Former     Current packs/day: 0.00     Average packs/day: 1 pack/day for 10.0 years (10.0 ttl pk-yrs)     Types: Cigarettes     Start date: 1963     Quit date: 1973     Years since quittin.1    Smokeless tobacco: Never   Substance Use Topics    Alcohol use: Yes     Comment: rare     Family History   Problem Relation Age of Onset    Cancer Mother         ovarian    Cancer Father         lung    Heart Disease Father         possibly    Cancer Paternal Grandfather         esophageal cancer    Cancer Brother 68        prostate cancer    Diabetes Maternal Grandmother     Breast Cancer No family hx of     Cancer - colorectal No family hx of     Coronary Artery Disease No family hx of     Hypertension No family hx of     Hyperlipidemia No family hx of     Cerebrovascular Disease No family hx of     Colon Cancer No family hx of     Prostate Cancer No family hx of     Other Cancer No family hx of     Depression No family hx of     Anxiety Disorder No family hx of     Mental Illness No family hx of     Substance Abuse No family hx of     Anesthesia Reaction No family hx of     Asthma No family hx of     Osteoporosis No family hx of     Genetic Disorder No family hx of     Thyroid Disease No family hx of     Obesity No family hx of     Unknown/Adopted No family hx of     Macular Degeneration No family hx of     Glaucoma No family hx of      History   Drug Use No     Medications -  Over-the-counter Tylenol as  "needed  Amoxicillin 2000 mg by mouth once as needed 1 hour prior to dental procedure  Aspirin 81 Mg by mouth daily-not taking  Duloxetine 20 Mg  Nexium 40 Mg by mouth daily  Fluticasone 1 spray to both nostrils daily as needed  Gabapentin 100 Mg every evening  Rosuvastatin 10 mg by mouth daily  Valsartan 160 mg by mouth daily  Vitamin D 400 units by mouth daily            Objective    /78   Pulse 89   Temp 97.9  F (36.6  C) (Oral)   Resp 16   Ht 1.791 m (5' 10.5\")   Wt 93.2 kg (205 lb 6.4 oz)   SpO2 96%   BMI 29.06 kg/m     Estimated body mass index is 29.06 kg/m  as calculated from the following:    Height as of this encounter: 1.791 m (5' 10.5\").    Weight as of this encounter: 93.2 kg (205 lb 6.4 oz).  Physical Exam  GENERAL: alert and no distress  EYES: Eyes grossly normal to inspection, PERRL and conjunctivae and sclerae normal  HENT: ear canals and TM's normal, nose and mouth without ulcers or lesions  NECK: no adenopathy, no asymmetry, masses, or scars  RESP: lungs clear to auscultation - no rales, rhonchi or wheezes  CV: regular rate and rhythm, normal S1 S2, no S3 or S4, no murmur, click or rub, no peripheral edema  PSYCH: mentation appears normal, affect normal/bright    Recent Labs   Lab Test 05/21/24  0920   HGB 14.5         POTASSIUM 4.4   CR 1.03        Diagnostics  Labs pending at this time.  Results will be reviewed when available.  Recent Results (from the past 24 hours)   Hemoglobin    Collection Time: 03/10/25  3:07 PM   Result Value Ref Range    Hemoglobin 14.7 13.3 - 17.7 g/dL      EKG from 7/18/24 reviewed and showed sinus rhythm  No EKG required, no history of coronary heart disease, significant arrhythmia, peripheral arterial disease or other structural heart disease.    Revised Cardiac Risk Index (RCRI)  The patient has the following serious cardiovascular risks for perioperative complications:   - No serious cardiac risks = 0 points     RCRI Interpretation: 0 " points: Class I (very low risk - 0.4% complication rate)         Signed Electronically by: Sobia Yeung NP  A copy of this evaluation report is provided to the requesting physician.

## 2025-03-24 DIAGNOSIS — I10 ESSENTIAL HYPERTENSION WITH GOAL BLOOD PRESSURE LESS THAN 140/90: ICD-10-CM

## 2025-03-24 RX ORDER — VALSARTAN 160 MG/1
160 TABLET ORAL DAILY
Qty: 90 TABLET | Refills: 3 | OUTPATIENT
Start: 2025-03-24

## (undated) DEVICE — BASIN SET MAJOR

## (undated) DEVICE — LINEN DRAPE 54X72" 5467

## (undated) DEVICE — SU MONOCRYL 3-0 PS-1 27" Y936H

## (undated) DEVICE — SOL WATER IRRIG 1000ML BOTTLE 2F7114

## (undated) DEVICE — GLOVE BIOGEL PI MICRO INDICATOR UNDERGLOVE SZ 7.5 48975

## (undated) DEVICE — ESU GROUND PAD ADULT W/CORD E7507

## (undated) DEVICE — DRSG KERLIX 4 1/2"X4YDS ROLL 6730

## (undated) DEVICE — STRAP STIRRUP W/SLIP 30187-030

## (undated) DEVICE — BLADE SAW RECIP STRK 70X12.5X1.2MM 0277-096-281

## (undated) DEVICE — TOURNIQUET CUFF 30" REPRO BLUE 60-7070-105

## (undated) DEVICE — DRAPE LAP W/ARMBOARD 29410

## (undated) DEVICE — GLOVE PROTEXIS W/NEU-THERA 8.5  2D73TE85

## (undated) DEVICE — BONE CLEANING TIP INTERPULSE  0210-010-000

## (undated) DEVICE — ESU PENCIL W/SMOKE EVAC NEPTUNE STRYKER 0703-046-000

## (undated) DEVICE — ESU PENCIL SMOKE EVAC W/ROCKER SWITCH 0703-047-000

## (undated) DEVICE — EYE PREP BETADINE 5% SOLUTION 30ML 0065-0411-30

## (undated) DEVICE — SOL NACL 0.9% IRRIG 3000ML BAG 2B7477

## (undated) DEVICE — GOWN IMPERVIOUS SPECIALTY XLG/XLONG 32474

## (undated) DEVICE — SUCTION IRR SYSTEM W/O TIP INTERPULSE HANDPIECE 0210-100-000

## (undated) DEVICE — GLOVE BIOGEL PI ULTRATOUCH G SZ 7.5 42175

## (undated) DEVICE — SUCTION MANIFOLD NEPTUNE 2 SYS 4 PORT 0702-020-000

## (undated) DEVICE — PREP CHLORAPREP 26ML TINTED ORANGE  260815

## (undated) DEVICE — SOL NACL 0.9% IRRIG 1000ML BOTTLE 2F7124

## (undated) DEVICE — DRSG XEROFORM 1X8"

## (undated) DEVICE — LINEN ORTHO PACK 5446

## (undated) DEVICE — GLOVE PROTEXIS BLUE W/NEU-THERA 8.0  2D73EB80

## (undated) DEVICE — SU VICRYL 0 CT-1 27" UND J260H

## (undated) DEVICE — PREP CHLORAPREP 26ML TINTED HI-LITE ORANGE 930815

## (undated) DEVICE — BLADE KNIFE SURG 15 371115

## (undated) DEVICE — NDL 19GA 1.5"

## (undated) DEVICE — PACK MINOR SBA15MIFSE

## (undated) DEVICE — BRUSH SURGICAL SCRUB W/4% CHLORHEXIDINE GLUCONATE SOL 4458A

## (undated) DEVICE — STRAP KNEE/BODY 31143004

## (undated) DEVICE — Device

## (undated) DEVICE — SU STRATAFIX PDS PLUS 1 CT-1 18" SXPP1A404

## (undated) DEVICE — HOOD FLYTE W/PEELAWAY 408-800-100

## (undated) DEVICE — BLADE SAW SAGITTAL STRK 18X90X1.27MM HD SYS 6 6118-127-090

## (undated) DEVICE — SU VICRYL 3-0 SH 27" J316H

## (undated) DEVICE — SUCTION TIP YANKAUER W/O VENT K86

## (undated) DEVICE — BONE CEMENT MIXEVAC HI VAC W/CARTRIDGE 0306-563-000

## (undated) DEVICE — SU VICRYL 2-0 CT-1 27" UND J259H

## (undated) DEVICE — LINEN TOWEL PACK X5 5464

## (undated) DEVICE — SU VICRYL 1 CT-1 36" UND J947H

## (undated) DEVICE — SOL WATER IRRIG 1000ML BOTTLE 07139-09

## (undated) DEVICE — DRAIN ROUND W/RESERV KIT JACKSON PRATT 10FR 400ML SU130-402D

## (undated) DEVICE — ADH SKIN CLOSURE PREMIERPRO EXOFIN 1.0ML 3470

## (undated) DEVICE — SPONGE LAP 18X18" X8435

## (undated) DEVICE — SU MONOCRYL 4-0 PS-2 18" UND Y496G

## (undated) RX ORDER — TRANEXAMIC ACID 650 MG/1
TABLET ORAL
Status: DISPENSED
Start: 2022-09-15

## (undated) RX ORDER — BUPIVACAINE HYDROCHLORIDE AND EPINEPHRINE 5; 5 MG/ML; UG/ML
INJECTION, SOLUTION EPIDURAL; INTRACAUDAL; PERINEURAL
Status: DISPENSED
Start: 2024-07-31

## (undated) RX ORDER — SIMETHICONE 40MG/0.6ML
SUSPENSION, DROPS(FINAL DOSAGE FORM)(ML) ORAL
Status: DISPENSED
Start: 2018-03-02

## (undated) RX ORDER — FENTANYL CITRATE 50 UG/ML
INJECTION, SOLUTION INTRAMUSCULAR; INTRAVENOUS
Status: DISPENSED
Start: 2022-09-15

## (undated) RX ORDER — BUPIVACAINE HYDROCHLORIDE 2.5 MG/ML
INJECTION, SOLUTION INFILTRATION; PERINEURAL
Status: DISPENSED
Start: 2022-09-15

## (undated) RX ORDER — ACETAMINOPHEN 325 MG/1
TABLET ORAL
Status: DISPENSED
Start: 2022-09-15

## (undated) RX ORDER — SODIUM CHLORIDE, SODIUM LACTATE, POTASSIUM CHLORIDE, CALCIUM CHLORIDE 600; 310; 30; 20 MG/100ML; MG/100ML; MG/100ML; MG/100ML
INJECTION, SOLUTION INTRAVENOUS
Status: DISPENSED
Start: 2022-09-15

## (undated) RX ORDER — OXYCODONE HYDROCHLORIDE 5 MG/1
TABLET ORAL
Status: DISPENSED
Start: 2022-09-15

## (undated) RX ORDER — CEFAZOLIN SODIUM/WATER 2 G/20 ML
SYRINGE (ML) INTRAVENOUS
Status: DISPENSED
Start: 2022-09-15

## (undated) RX ORDER — FENTANYL CITRATE 50 UG/ML
INJECTION, SOLUTION INTRAMUSCULAR; INTRAVENOUS
Status: DISPENSED
Start: 2018-03-02